# Patient Record
Sex: FEMALE | Race: AMERICAN INDIAN OR ALASKA NATIVE | HISPANIC OR LATINO | Employment: OTHER | URBAN - METROPOLITAN AREA
[De-identification: names, ages, dates, MRNs, and addresses within clinical notes are randomized per-mention and may not be internally consistent; named-entity substitution may affect disease eponyms.]

---

## 2018-09-28 ENCOUNTER — OFFICE VISIT (OUTPATIENT)
Dept: OBGYN | Facility: CLINIC | Age: 33
End: 2018-09-28
Attending: OBSTETRICS & GYNECOLOGY
Payer: COMMERCIAL

## 2018-09-28 VITALS
SYSTOLIC BLOOD PRESSURE: 113 MMHG | HEIGHT: 60 IN | WEIGHT: 129.9 LBS | BODY MASS INDEX: 25.5 KG/M2 | HEART RATE: 93 BPM | DIASTOLIC BLOOD PRESSURE: 77 MMHG

## 2018-09-28 DIAGNOSIS — N92.0 MENORRHAGIA WITH REGULAR CYCLE: ICD-10-CM

## 2018-09-28 DIAGNOSIS — Z12.4 SCREENING FOR MALIGNANT NEOPLASM OF CERVIX: ICD-10-CM

## 2018-09-28 DIAGNOSIS — D25.1 INTRAMURAL LEIOMYOMA OF UTERUS: Primary | ICD-10-CM

## 2018-09-28 PROCEDURE — G0145 SCR C/V CYTO,THINLAYER,RESCR: HCPCS | Performed by: OBSTETRICS & GYNECOLOGY

## 2018-09-28 PROCEDURE — 87624 HPV HI-RISK TYP POOLED RSLT: CPT | Performed by: OBSTETRICS & GYNECOLOGY

## 2018-09-28 PROCEDURE — G0463 HOSPITAL OUTPT CLINIC VISIT: HCPCS

## 2018-09-28 RX ORDER — LEVONORGESTREL 1.5 MG/1
1.5 TABLET ORAL ONCE
Qty: 1 TABLET | Refills: 0 | Status: SHIPPED | OUTPATIENT
Start: 2018-09-28 | End: 2019-12-31

## 2018-09-28 RX ORDER — ESTRADIOL 0.1 MG/G
2 CREAM VAGINAL WEEKLY
Qty: 42.5 G | Refills: 11 | Status: SHIPPED | OUTPATIENT
Start: 2018-09-28 | End: 2019-11-07

## 2018-09-28 NOTE — LETTER
Date:October 8, 2018      Patient was self referred, no letter generated. Do not send.        [facility] Information

## 2018-09-28 NOTE — LETTER
2018       RE: Shayla Mcclure  [address] [place] [id number]     Dear Colleague,    Thank you for referring your patient, Shayla Mcclure, to the [facility] at [facility]. Please see a copy of my visit note below.    32 yo  here to establish care.   Due for routine screening.   Desires Mirena IUD pulled due to irregular spotting.  will obtain a vasectomy. Declines contraception meanwhile, agrees to Plan B Rx.     Moved to [place] for medical care for Daughter. Likely will not have any more children due to hereditary risk for Scooter's Syndrome.     Patient Active Problem List   Diagnosis     Menorrhagia     Past Medical History:   Diagnosis Date     Leiomyoma      Past Surgical History:   Procedure Laterality Date     C/SECTION, LOW TRANSVERSE       MYOMECTOMY UTERUS       Obstetric History       T0      L1     SAB0   TAB0   Ectopic0   Multiple0   Live Births0       # Outcome Date GA Lbr Erik/2nd Weight Sex Delivery Anes PTL Lv   1       CS-LTranv           Social History     Social History     Marital status:      Spouse name: N/A     Number of children: N/A     Years of education: N/A     Occupational History     homemaker      Social History Main Topics     Smoking status: Never Smoker     Smokeless tobacco: Never Used     Alcohol use Yes      Comment: occasionally     Drug use: No     Sexual activity: Yes     Partners: Female     Birth control/ protection: IUD     Other Topics Concern     None     Social History Narrative    Patient and her  moved to [place] (from [place] via [place]) for their daughter with Scooter's Syndrome.     Very happy in [place].     Silvia Leyva         Family History   Problem Relation Age of Onset     Cancer No family hx of      Scooter's Syndrome     /77  Pulse 93  Ht 1.524 m (5')  Wt 58.9 kg (129 lb 14.4 oz)  LMP  (Exact Date)  BMI 25.37  kg/m2  Exam:  Constitutional: healthy, alert and no distress  Head: Normocephalic. No masses, lesions, tenderness or abnormalities  Neck: Neck supple. No adenopathy. Thyroid symmetric, normal size,, Carotids without bruits.  Breast: without masses or lesions bilaterally.no LAD.   Cardiovascular: negative, PMI normal. No lifts, heaves, or thrills. RRR. No murmurs, clicks gallops or rub  Respiratory: negative, Percussion normal. Good diaphragmatic excursion. Lungs clear  Gastrointestinal: Abdomen soft, non-tender. BS normal. No masses, organomegaly  : Pelvic Exam:  Vulva: No external lesions, normal hair distribution, no adenopathy  Vagina: Moist, pink, no abnormal discharge, well rugated, no lesions  Cervix: Pap smear is taken, parous, smooth, pink, no visible lesions  Mirena IUD easily removed with ring forceps.  Uterus: enlarged, globular, anteverted, non-tender, mobile  Ovaries: No mass, non-tender, mobile  Rectal exam: No mass, non-tender, normal sphincter tone  Musculoskeletal: extremities normal- no gross deformities noted, gait normal and normal muscle tone  Skin: no suspicious lesions or rashes  Neurologic: Gait normal. Reflexes normal and symmetric. Sensation grossly WNL.  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  Silvia Leyva      Again, thank you for allowing me to participate in the care of your patient.      Sincerely,    Silvia Leyva MD

## 2018-09-28 NOTE — MR AVS SNAPSHOT
After Visit Summary   9/28/2018    Shayla Mcclure    MRN: [id number]           Patient Information     Date Of Birth          1985        Visit Information        Provider Department      9/28/2018 2:30 PM Silvia Leyva MD [facility]        Today's Diagnoses     Intramural leiomyoma of uterus    -  1    Screening for malignant neoplasm of cervix        Menorrhagia with regular cycle           Follow-ups after your visit        Who to contact     Please call your clinic at [phone] to:    Ask questions about your health    Make or cancel appointments    Discuss your medicines    Learn about your test results    Speak to your doctor            Additional Information About Your Visit        MyChart Information     [email or web address] gives you secure access to your electronic health record. If you see a primary care provider, you can also send messages to your care team and make appointments. If you have questions, please call your primary care clinic.  If you do not have a primary care provider, please call [phone] and they will assist you.      [email or web address] is an electronic gateway that provides easy, online access to your medical records. With [email or web address], you can request a clinic appointment, read your test results, renew a prescription or communicate with your care team.     To access your existing account, please contact your [facility] or call [phone] for assistance.        Care EveryWhere ID     This is your Care EveryWhere ID. This could be used by other organizations to access your [facility] medical records  [id number]        Your Vitals Were     Pulse Height Last Period BMI (Body Mass Index)          93 1.524 m (5') (Exact Date) 25.37 kg/m2         Blood Pressure from Last 3 Encounters:   09/28/18 113/77    Weight from Last 3 Encounters:   09/28/18 58.9 kg (129 lb 14.4 oz)              We Performed the Following     HPV High Risk Types DNA  Cervical     Obtaining, preparing and conveyance of cervical or vaginal smear to laboratory.     Pap imaged thin layer screen with HPV - recommended age 30 - 65 years (select HPV order below)          Today's Medication Changes          These changes are accurate as of 9/28/18 11:59 PM.  If you have any questions, ask your nurse or doctor.               Start taking these medicines.        Dose/Directions    estradiol 0.1 MG/GM cream   Commonly known as:  ESTRACE VAGINAL   Used for:  Intramural leiomyoma of uterus   Started by:  Silvia Leyva MD        Dose:  2 g   Place 2 g vaginally once a week   Quantity:  42.5 g   Refills:  11       levonorgestrel 1.5 MG Tabs tablet   Commonly known as:  PLAN B   Used for:  Intramural leiomyoma of uterus   Started by:  Silvia Leyva MD        Dose:  1.5 mg   Take 1 tablet (1.5 mg) by mouth once for 1 dose   Quantity:  1 tablet   Refills:  0            Where to get your medicines      These medications were sent to [facility] [address]  [address] [address]     Phone:  [phone]     estradiol 0.1 MG/GM cream         Some of these will need a paper prescription and others can be bought over the counter.  Ask your nurse if you have questions.     Bring a paper prescription for each of these medications     levonorgestrel 1.5 MG Tabs tablet                Primary Care Provider    None Specified       No primary provider on file.        Equal Access to Services     REGGIE PAGE AH: Hadlinda Haq, waaxda luqadaha, qaybta kaalmada kristina, john blackman. So [facility] [phone].    ATENCIÓN: Si habla español, tiene a abebe disposición servicios gratuitos de asistencia lingüística. Ifeanyi al [phone].    We comply with applicable federal civil rights laws and [place] laws. We do not discriminate on the basis of race, color, national origin, age, disability, sex,  sexual orientation, or gender identity.            Thank you!     Thank you for choosing [facility]  for your care. Our goal is always to provide you with excellent care. Hearing back from our patients is one way we can continue to improve our services. Please take a few minutes to complete the written survey that you may receive in the mail after your visit with us. Thank you!             Your Updated Medication List - Protect others around you: Learn how to safely use, store and throw away your medicines at www.disposemymeds.org.          This list is accurate as of 9/28/18 11:59 PM.  Always use your most recent med list.                   Brand Name Dispense Instructions for use Diagnosis    estradiol 0.1 MG/GM cream    ESTRACE VAGINAL    42.5 g    Place 2 g vaginally once a week    Intramural leiomyoma of uterus       levonorgestrel 1.5 MG Tabs tablet    PLAN B    1 tablet    Take 1 tablet (1.5 mg) by mouth once for 1 dose    Intramural leiomyoma of uterus       levonorgestrel 20 MCG/24HR IUD    MIRENA     1 each by Intrauterine route once

## 2018-10-02 LAB
COPATH REPORT: NORMAL
PAP: NORMAL

## 2018-10-03 PROBLEM — N92.0 MENORRHAGIA: Status: ACTIVE | Noted: 2018-10-03

## 2018-10-03 LAB
FINAL DIAGNOSIS: NORMAL
HPV HR 12 DNA CVX QL NAA+PROBE: NEGATIVE
HPV16 DNA SPEC QL NAA+PROBE: NEGATIVE
HPV18 DNA SPEC QL NAA+PROBE: NEGATIVE
SPECIMEN DESCRIPTION: NORMAL
SPECIMEN SOURCE CVX/VAG CYTO: NORMAL

## 2018-10-03 NOTE — PROGRESS NOTES
34 yo  here to establish care.   Due for routine screening.   Desires Mirena IUD pulled due to irregular spotting.  will obtain a vasectomy. Declines contraception meanwhile, agrees to Plan B Rx.     Moved to [place] for medical care for Daughter. Likely will not have any more children due to hereditary risk for Scooter's Syndrome.     Patient Active Problem List   Diagnosis     Menorrhagia     Past Medical History:   Diagnosis Date     Leiomyoma      Past Surgical History:   Procedure Laterality Date     C/SECTION, LOW TRANSVERSE       MYOMECTOMY UTERUS       Obstetric History       T0      L1     SAB0   TAB0   Ectopic0   Multiple0   Live Births0       # Outcome Date GA Lbr Erik/2nd Weight Sex Delivery Anes PTL Lv   1       CS-LTranv           Social History     Social History     Marital status:      Spouse name: N/A     Number of children: N/A     Years of education: N/A     Occupational History     homemaker      Social History Main Topics     Smoking status: Never Smoker     Smokeless tobacco: Never Used     Alcohol use Yes      Comment: occasionally     Drug use: No     Sexual activity: Yes     Partners: Female     Birth control/ protection: IUD     Other Topics Concern     None     Social History Narrative    Patient and her  moved to [place] (from [place] via [place]) for their daughter with Scooter's Syndrome.     Very happy in [place].     Silvia Sravani Swansonell         Family History   Problem Relation Age of Onset     Cancer No family hx of      Scooter's Syndrome     /77  Pulse 93  Ht 1.524 m (5')  Wt 58.9 kg (129 lb 14.4 oz)  LMP  (Exact Date)  BMI 25.37 kg/m2  Exam:  Constitutional: healthy, alert and no distress  Head: Normocephalic. No masses, lesions, tenderness or abnormalities  Neck: Neck supple. No adenopathy. Thyroid symmetric, normal size,, Carotids without bruits.  Breast: without masses or lesions bilaterally.no LAD.   Cardiovascular: negative, PMI  normal. No lifts, heaves, or thrills. RRR. No murmurs, clicks gallops or rub  Respiratory: negative, Percussion normal. Good diaphragmatic excursion. Lungs clear  Gastrointestinal: Abdomen soft, non-tender. BS normal. No masses, organomegaly  : Pelvic Exam:  Vulva: No external lesions, normal hair distribution, no adenopathy  Vagina: Moist, pink, no abnormal discharge, well rugated, no lesions  Cervix: Pap smear is taken, parous, smooth, pink, no visible lesions  Mirena IUD easily removed with ring forceps.  Uterus: enlarged, globular, anteverted, non-tender, mobile  Ovaries: No mass, non-tender, mobile  Rectal exam: No mass, non-tender, normal sphincter tone  Musculoskeletal: extremities normal- no gross deformities noted, gait normal and normal muscle tone  Skin: no suspicious lesions or rashes  Neurologic: Gait normal. Reflexes normal and symmetric. Sensation grossly WNL.  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  Silvia Leyva

## 2018-10-04 ENCOUNTER — RADIANT APPOINTMENT (OUTPATIENT)
Dept: ULTRASOUND IMAGING | Facility: CLINIC | Age: 33
End: 2018-10-04
Attending: OBSTETRICS & GYNECOLOGY
Payer: COMMERCIAL

## 2018-10-04 DIAGNOSIS — D25.1 INTRAMURAL LEIOMYOMA OF UTERUS: ICD-10-CM

## 2018-10-04 PROCEDURE — 76830 TRANSVAGINAL US NON-OB: CPT

## 2018-11-13 ENCOUNTER — OFFICE VISIT (OUTPATIENT)
Dept: ORTHOPEDICS | Facility: CLINIC | Age: 33
End: 2018-11-13
Payer: COMMERCIAL

## 2018-11-13 ENCOUNTER — RADIANT APPOINTMENT (OUTPATIENT)
Dept: GENERAL RADIOLOGY | Facility: CLINIC | Age: 33
End: 2018-11-13
Attending: FAMILY MEDICINE
Payer: COMMERCIAL

## 2018-11-13 VITALS
HEIGHT: 60 IN | HEART RATE: 91 BPM | BODY MASS INDEX: 25.32 KG/M2 | SYSTOLIC BLOOD PRESSURE: 110 MMHG | WEIGHT: 129 LBS | DIASTOLIC BLOOD PRESSURE: 71 MMHG

## 2018-11-13 DIAGNOSIS — M79.671 RIGHT FOOT PAIN: Primary | ICD-10-CM

## 2018-11-13 DIAGNOSIS — M79.671 RIGHT FOOT PAIN: ICD-10-CM

## 2018-11-13 RX ORDER — INFLUENZA A VIRUS A/VICTORIA/2454/2019 IVR-207 (H1N1) ANTIGEN (PROPIOLACTONE INACTIVATED), INFLUENZA A VIRUS A/HONG KONG/2671/2019 IVR-208 (H3N2) ANTIGEN (PROPIOLACTONE INACTIVATED), INFLUENZA B VIRUS B/VICTORIA/705/2018 BVR-11 ANTIGEN (PROPIOLACTONE INACTIVATED), INFLUENZA B VIRUS B/PHUKET/3073/2013 BVR-1B ANTIGEN (PROPIOLACTONE INACTIVATED) 15; 15; 15; 15 UG/.5ML; UG/.5ML; UG/.5ML; UG/.5ML
INJECTION, SUSPENSION INTRAMUSCULAR
Refills: 0 | COMMUNITY
Start: 2018-09-28 | End: 2019-12-31

## 2018-11-13 NOTE — LETTER
Date:November 14, 2018      Patient was self referred, no letter generated. Do not send.        [facility] Information

## 2018-11-13 NOTE — MR AVS SNAPSHOT
After Visit Summary   11/13/2018    Shayla Mcclure    MRN: [id number]           Patient Information     Date Of Birth          1985        Visit Information        Provider Department      11/13/2018 4:40 PM Vernon Nguyen, [facility]        Today's Diagnoses     Right foot pain    -  1       Follow-ups after your visit        Who to contact     Please call your clinic at [phone] to:    Ask questions about your health    Make or cancel appointments    Discuss your medicines    Learn about your test results    Speak to your doctor            Additional Information About Your Visit        MyChart Information     [employer] gives you secure access to your electronic health record. If you see a primary care provider, you can also send messages to your care team and make appointments. If you have questions, please call your primary care clinic.  If you do not have a primary care provider, please call [phone] and they will assist you.      [employer] is an electronic gateway that provides easy, online access to your medical records. With [employer], you can request a clinic appointment, read your test results, renew a prescription or communicate with your care team.     To access your existing account, please contact your [facility] or call [phone] for assistance.        Care EveryWhere ID     This is your Care EveryWhere ID. This could be used by other organizations to access your [facility] medical records  [id number]        Your Vitals Were     Pulse Height BMI (Body Mass Index)             91 1.524 m (5') 25.19 kg/m2          Blood Pressure from Last 3 Encounters:   11/13/18 110/71   09/28/18 113/77    Weight from Last 3 Encounters:   11/13/18 58.5 kg (129 lb)   09/28/18 58.9 kg (129 lb 14.4 oz)               Primary Care Provider    None Specified       No primary provider on file.        Equal Access to Services     RISSA  GAAR : Hadii aad ku hadlea Haq, waaxda luqadaha, qaybta kadeanda kristina, john lianna radhaclaribel barfield sbletty alvarez . So [facility] [phone].    ATENCIÓN: Si habla español, tiene a abebe disposición servicios gratuitos de asistencia lingüística. Llame al [phone].    We comply with applicable federal civil rights laws and [place] laws. We do not discriminate on the basis of race, color, national origin, age, disability, sex, sexual orientation, or gender identity.            Thank you!     Thank you for choosing [facility]  for your care. Our goal is always to provide you with excellent care. Hearing back from our patients is one way we can continue to improve our services. Please take a few minutes to complete the written survey that you may receive in the mail after your visit with us. Thank you!             Your Updated Medication List - Protect others around you: Learn how to safely use, store and throw away your medicines at www.disposemymeds.org.          This list is accurate as of 11/13/18  5:35 PM.  Always use your most recent med list.                   Brand Name Dispense Instructions for use Diagnosis    AFLURIA QUADRIVALENT 0.5 ML injection   Generic drug:  influenza quadrivalent (PF) vacc      ADM 0.5ML IM UTD        estradiol 0.1 MG/GM cream    ESTRACE VAGINAL    42.5 g    Place 2 g vaginally once a week    Intramural leiomyoma of uterus       levonorgestrel 1.5 MG Tabs tablet    PLAN B    1 tablet    Take 1 tablet (1.5 mg) by mouth once for 1 dose    Intramural leiomyoma of uterus       levonorgestrel 20 MCG/24HR IUD    MIRENA     1 each by Intrauterine route once

## 2018-11-13 NOTE — PROGRESS NOTES
SPORTS & ORTHOPEDIC WALK-IN VISIT 11/13/2018    Primary Care Physician:   A little bit ago today  while doing laundry, opened dryer door, when the door fell off and landed on R foot, shattering to pieces. Immediate pain in foot, Most is dorsal mid foot. Denies any hx of foot injuries.    Reason for visit:     What part of your body is injured / painful?  right foot    What caused the injury /pain? Dryer door fell on foot    How long ago did your injury occur or pain begin? today    What are your most bothersome symptoms? Pain    How would you characterize your symptom?  sharp and throbing    What makes your symptoms better? Rest and Ice    What makes your symptoms worse? Walking    Have you been previously seen for this problem? No    Medical History:    Any recent changes to your medical history? No    Any new medication prescribed since last visit? No    Have you had surgery on this body part before? No    Social History:    Occupation: Stay at home mom    Handedness: Right    Exercise: 2-3 days/week    Review of Systems:    Do you have fever, chills, weight loss? No    Do you have any vision problems? No    Do you have any chest pain or edema? No    Do you have any shortness of breath or wheezing?  No    Do you have stomach problems? No    Do you have any numbness or focal weakness? No    Do you have diabetes? No    Do you have problems with bleeding or clotting? No    Do you have an rashes or other skin lesions? No       CHIEF COMPLAINT:  Pain of the Right Foot       HISTORY OF PRESENT ILLNESS  Ms. Mcclure is a pleasant 33 year old year old female who presents to clinic today with right foot pain.     Shayla explains that this afternoon she was removing clothes from her dryer when the dryer door broke off, landing directly on the top of her right foot.  She felt immediate pain in her foot and noticed some swelling that developed over the next 15 minutes.  She came immediately here for assessment.  She is  having pain with each step but is able to walk.  She has noticed some discoloration on the top of her foot as well.    Additional history: as documented    MEDICAL HISTORY  Patient Active Problem List   Diagnosis     Menorrhagia       Current Outpatient Prescriptions   Medication Sig Dispense Refill     AFLURIA QUADRIVALENT 0.5 ML injection ADM 0.5ML IM UTD  0     estradiol (ESTRACE VAGINAL) 0.1 MG/GM cream Place 2 g vaginally once a week 42.5 g 11     levonorgestrel (MIRENA) 20 MCG/24HR IUD 1 each by Intrauterine route once       levonorgestrel (PLAN B) 1.5 MG TABS tablet Take 1 tablet (1.5 mg) by mouth once for 1 dose 1 tablet 0       Allergies   Allergen Reactions     Clindamycin        Family History   Problem Relation Age of Onset     Cancer No family hx of      Scooter's Syndrome       Additional medical/Social/Surgical histories reviewed in [facility] and updated as appropriate.     REVIEW OF SYSTEMS (11/13/2018)  CONSTITUTIONAL: Denies fever and weight loss  EYES: Denies acute vision changes  ENT: Denies hearing changes or difficulty swallowing  CARDIAC: Denies chest pain or edema  RESPIRATORY: Denies dyspnea, cough or wheeze  GASTROINTESTINAL: Denies abdominal pain, nausea, vomiting  MUSCULOSKELETAL: See HPI  SKIN: Denies any recent rash or lesion  NEUROLOGICAL: Denies numbness or focal weakness  PSYCHIATRIC: No history of psychiatric symptoms or problems  ENDOCRINE: Denies current diagnosis of diabetes  HEMATOLOGY: Denies episodes of easy bleeding      PHYSICAL EXAM  Vitals:    11/13/18 1635   BP: 110/71   Pulse: 91   Weight: 58.5 kg (129 lb)   Height: 1.524 m (5')     General  - normal appearance, in no obvious distress  CV  - normal pulses at posterior tib and dorsalis pedis  Pulm  - normal respiratory pattern, non-labored  Musculoskeletal -right foot  - stance: Gait favors right foot  - inspection: Mild swelling over medial and dorsal midfoot  - palpation: Tender directly over medial cuneiform  - ROM: normal  active and passive ROM of great and lesser toes, no pain with MT translation  - strength: 5/5 in all planes  Neuro  - no sensory or motor deficit, grossly normal coordination, normal muscle tone  Skin  - no ecchymosis, erythema, warmth, or induration, no obvious rash  Psych  - interactive, appropriate, normal mood and affect         ASSESSMENT & PLAN  Ms. Mcclure is a 33 year old year old female who presents to clinic today with right foot injury.    I ordered and reviewed an x-ray of her foot which shows no obvious acute bony issue.    Shayla most likely has a bony contusion, although I will wait for formal radiology read.  We discussed this in some detail.    We did give her a Cam walker for comfort as if this is a true bony contusion this may take weeks to heal.  She can bear weight while walking in the boot.    If her pain continues over the next few weeks, or worsens, we should follow-up.  If this is the case I would likely repeat her x-ray.  If she continues to do well she can follow-up as needed.    It was a pleasure seeing Shayla today.    Vernon Nguyen DO, CAQSM  Primary Care Sports Medicine

## 2018-11-13 NOTE — LETTER
11/13/2018       RE: Shayla Mcclure  [address] [address]     Dear Colleague,    Thank you for referring your patient, Shayla Mcclure, to the [facility] Please see a copy of my visit note below.          SPORTS & ORTHOPEDIC WALK-IN VISIT 11/13/2018    Primary Care Physician:   A little bit ago today  while doing laundry, opened dryer door, when the door fell off and landed on R foot, shattering to pieces. Immediate pain in foot, Most is dorsal mid foot. Denies any hx of foot injuries.    Reason for visit:     What part of your body is injured / painful?  right foot    What caused the injury /pain? Dryer door fell on foot    How long ago did your injury occur or pain begin? today    What are your most bothersome symptoms? Pain    How would you characterize your symptom?  sharp and throbing    What makes your symptoms better? Rest and Ice    What makes your symptoms worse? Walking    Have you been previously seen for this problem? No    Medical History:    Any recent changes to your medical history? No    Any new medication prescribed since last visit? No    Have you had surgery on this body part before? No    Social History:    Occupation: Stay at home mom    Handedness: Right    Exercise: 2-3 days/week    Review of Systems:    Do you have fever, chills, weight loss? No    Do you have any vision problems? No    Do you have any chest pain or edema? No    Do you have any shortness of breath or wheezing?  No    Do you have stomach problems? No    Do you have any numbness or focal weakness? No    Do you have diabetes? No    Do you have problems with bleeding or clotting? No    Do you have an rashes or other skin lesions? No       CHIEF COMPLAINT:  Pain of the Right Foot       HISTORY OF PRESENT ILLNESS  Ms. Mcclure is a pleasant 33 year old year old female who presents to clinic today with right foot pain.     Shayla  explains that this afternoon she was removing clothes from her dryer when the dryer door broke off, landing directly on the top of her right foot.  She felt immediate pain in her foot and noticed some swelling that developed over the next 15 minutes.  She came immediately here for assessment.  She is having pain with each step but is able to walk.  She has noticed some discoloration on the top of her foot as well.    Additional history: as documented    MEDICAL HISTORY  Patient Active Problem List   Diagnosis     Menorrhagia       Current Outpatient Prescriptions   Medication Sig Dispense Refill     AFLURIA QUADRIVALENT 0.5 ML injection ADM 0.5ML IM UTD  0     estradiol (ESTRACE VAGINAL) 0.1 MG/GM cream Place 2 g vaginally once a week 42.5 g 11     levonorgestrel (MIRENA) 20 MCG/24HR IUD 1 each by Intrauterine route once       levonorgestrel (PLAN B) 1.5 MG TABS tablet Take 1 tablet (1.5 mg) by mouth once for 1 dose 1 tablet 0       Allergies   Allergen Reactions     Clindamycin        Family History   Problem Relation Age of Onset     Cancer No family hx of      Scooter's Syndrome       Additional medical/Social/Surgical histories reviewed [facility] and updated as appropriate.     REVIEW OF SYSTEMS (11/13/2018)  CONSTITUTIONAL: Denies fever and weight loss  EYES: Denies acute vision changes  ENT: Denies hearing changes or difficulty swallowing  CARDIAC: Denies chest pain or edema  RESPIRATORY: Denies dyspnea, cough or wheeze  GASTROINTESTINAL: Denies abdominal pain, nausea, vomiting  MUSCULOSKELETAL: See HPI  SKIN: Denies any recent rash or lesion  NEUROLOGICAL: Denies numbness or focal weakness  PSYCHIATRIC: No history of psychiatric symptoms or problems  ENDOCRINE: Denies current diagnosis of diabetes  HEMATOLOGY: Denies episodes of easy bleeding      PHYSICAL EXAM  Vitals:    11/13/18 1635   BP: 110/71   Pulse: 91   Weight: 58.5 kg (129 lb)   Height: 1.524 m (5')     General  - normal appearance, in no obvious  distress  CV  - normal pulses at posterior tib and dorsalis pedis  Pulm  - normal respiratory pattern, non-labored  Musculoskeletal -right foot  - stance: Gait favors right foot  - inspection: Mild swelling over medial and dorsal midfoot  - palpation: Tender directly over medial cuneiform  - ROM: normal active and passive ROM of great and lesser toes, no pain with MT translation  - strength: 5/5 in all planes  Neuro  - no sensory or motor deficit, grossly normal coordination, normal muscle tone  Skin  - no ecchymosis, erythema, warmth, or induration, no obvious rash  Psych  - interactive, appropriate, normal mood and affect         ASSESSMENT & PLAN  Ms. Mcclure is a 33 year old year old female who presents to clinic today with right foot injury.    I ordered and reviewed an x-ray of her foot which shows no obvious acute bony issue.    Shayla most likely has a bony contusion, although I will wait for formal radiology read.  We discussed this in some detail.    We did give her a Cam walker for comfort as if this is a true bony contusion this may take weeks to heal.  She can bear weight while walking in the boot.    If her pain continues over the next few weeks, or worsens, we should follow-up.  If this is the case I would likely repeat her x-ray.  If she continues to do well she can follow-up as needed.    It was a pleasure seeing Shayla today.    Vernon Nguyen DO, [facility]  Primary Care Sports Medicine      Again, thank you for allowing me to participate in the care of your patient.      Sincerely,    Vernon Nguyen DO

## 2019-03-11 ENCOUNTER — TELEPHONE (OUTPATIENT)
Dept: OBGYN | Facility: CLINIC | Age: 34
End: 2019-03-11

## 2019-03-11 NOTE — TELEPHONE ENCOUNTER
"Shayla calling because she\"is high risk\" and period about 1 week late. Home UPT negative. LMP 2/6/19.   As further questioned her, she thinks she is high risk d/t being Rh Negative blood type. She had bleeding early in last pregnancy, requiring rhogam.   Denies any bleeding or cramping now.   Informed her if pregnant she would get rhogam around 26 wks GA, she only had it last pregnancy d/t the bleeding.  Instructed to repeat upt in 1 week if still doesn't get her period. If still negative without getting a period, instructed to call back for b-hcg. Pt indicated understanding and agreed with plan.    "

## 2019-11-07 ENCOUNTER — OFFICE VISIT (OUTPATIENT)
Dept: FAMILY MEDICINE | Facility: CLINIC | Age: 34
End: 2019-11-07
Payer: COMMERCIAL

## 2019-11-07 VITALS
SYSTOLIC BLOOD PRESSURE: 110 MMHG | OXYGEN SATURATION: 97 % | RESPIRATION RATE: 20 BRPM | HEART RATE: 109 BPM | BODY MASS INDEX: 25.37 KG/M2 | WEIGHT: 129.9 LBS | TEMPERATURE: 98.8 F | DIASTOLIC BLOOD PRESSURE: 70 MMHG

## 2019-11-07 DIAGNOSIS — H69.93 DYSFUNCTION OF BOTH EUSTACHIAN TUBES: ICD-10-CM

## 2019-11-07 DIAGNOSIS — J06.9 VIRAL UPPER RESPIRATORY TRACT INFECTION: Primary | ICD-10-CM

## 2019-11-07 LAB
FLUAV+FLUBV AG SPEC QL: NEGATIVE
FLUAV+FLUBV AG SPEC QL: NEGATIVE
SPECIMEN SOURCE: NORMAL

## 2019-11-07 PROCEDURE — 87804 INFLUENZA ASSAY W/OPTIC: CPT | Mod: 59 | Performed by: FAMILY MEDICINE

## 2019-11-07 PROCEDURE — 99213 OFFICE O/P EST LOW 20 MIN: CPT | Performed by: FAMILY MEDICINE

## 2019-11-07 ASSESSMENT — PAIN SCALES - GENERAL: PAINLEVEL: MODERATE PAIN (5)

## 2019-11-07 NOTE — LETTER
November 7, 2019      George Mcclure  [address] [place] [id number]      To Whom It May Concern:    George Mcclure was in our clinic. He may return to work without restrictions.      Sincerely,        Valente Richardson MD

## 2019-11-07 NOTE — PROGRESS NOTES
Subjective     Shayla Mcclure is a 34 year old female who presents to clinic today for the following health issues:    History of Present Illness        She eats 2-3 servings of fruits and vegetables daily.She consumes 0 sweetened beverage(s) daily.  She is taking medications regularly.     Acute Illness   Acute illness concerns: Tuesday evening to Wednesday morning  Onset: fever chills body aches     Fever: YES -subjective not measured    Chills/Sweats: YES    Headache (location?): YES    Sinus Pressure:YES    Conjunctivitis:  no    Ear Pain: YES: bilateral    Rhinorrhea: YES    Congestion: no     Sore Throat: YES     Cough: YES-non-productive    Wheeze: no     Decreased Appetite: YES    Nausea: YES    Vomiting: no     Diarrhea:  no     Dysuria/Freq.: no     Fatigue/Achiness: YES    Sick/Strep Exposure: YES     Therapies Tried and outcome: OTC meds     Patient states she began feeling sick Tuesday night into Wednesday morning.  She had a subjective fever and chills Wednesday morning but did not measure temperature at home.  She has associated symptoms of bilateral frontal maxillary headache, sinus pressure and congestion, ear pain bilaterally, sore throat, nonproductive cough.  She also has generalized muscle and body aches.  She has tried NyQuil to help her sleep at night.    Typically she would not come in for concern such as this; however, she does have a young daughter at home who recently had a bone marrow transplant approximately 1 year ago and she is just concerned that she could catch this if it is the flu.    She did already receive her flu shot this year.    Patient Active Problem List   Diagnosis     Menorrhagia     Past Surgical History:   Procedure Laterality Date     C/SECTION, LOW TRANSVERSE       MYOMECTOMY UTERUS  2014       Social History     Tobacco Use     Smoking status: Never Smoker     Smokeless tobacco: Never Used   Substance Use Topics     Alcohol use: Yes     Comment: occasionally     Family  History   Problem Relation Age of Onset     Cancer No family hx of         Scooter's Syndrome         Current Outpatient Medications   Medication Sig Dispense Refill     Multiple Vitamins-Minerals (MULTIVITAMIN PO)        NEW MED        NEW MED        AFLURIA QUADRIVALENT 0.5 ML injection ADM 0.5ML IM UTD  0     levonorgestrel (MIRENA) 20 MCG/24HR IUD 1 each by Intrauterine route once       levonorgestrel (PLAN B) 1.5 MG TABS tablet Take 1 tablet (1.5 mg) by mouth once for 1 dose 1 tablet 0     Allergies   Allergen Reactions     Clindamycin      Reviewed and updated as needed this visit by Provider  Tobacco  Allergies  Meds  Problems  Med Hx  Surg Hx  Fam Hx         Review of Systems   ROS COMP: Constitutional, HEENT, lymph, MSK, Derm, cardiovascular, pulmonary, gi and gu systems are negative, except as otherwise noted.      Objective    /70 (BP Location: Left arm, Patient Position: Sitting, Cuff Size: Adult Regular)   Pulse 109   Temp 98.8  F (37.1  C) (Temporal)   Resp 20   Wt 58.9 kg (129 lb 14.4 oz)   LMP 10/30/2019   SpO2 97%   BMI 25.37 kg/m    Body mass index is 25.37 kg/m .  Physical Exam   General: Appears well and in no acute distress.  HEENT: Bilateral edematous nasal turbinates.  Eyes grossly normal to inspection. Extraocular movements intact. Pupils equal, round, and reactive to light. Mucous membranes moist. No ulcers or lesions noted in the oropharynx.  TMs and ear canals normal.  Cardiovascular: Regular rate and rhythm, normal S1 and S2 without murmur. No extra heartsounds or friction rub. Radial pulses present and equal bilaterally.  Respiratory: Lungs clear to auscultation bilaterally. No wheezing or crackles. No prolonged expiration. Symmetrical chest rise.  Musculoskeletal: No gross extremity deformities. No peripheral edema. Normal muscle bulk.     Diagnostic Test Results:  Labs reviewed in Epic  Results for orders placed or performed in visit on 11/07/19 (from the past 24  "hour(s))   Influenza A/B antigen   Result Value Ref Range    Influenza A/B Agn Specimen Nasopharyngeal     Influenza A Negative NEG^Negative    Influenza B Negative NEG^Negative           Assessment & Plan   1. Viral upper respiratory tract infection: No measured fever here and cough is the least of her symptoms.  Less likely to be influenza.  Rapid flu negative.  Discussed that this does not rule out influenza; however, I think it makes it much much less likely.  Discussed the small benefit from Tamiflu in a course duration.  We will proceed with conservative cares at this time.  Discussed continued good hand hygiene and \"vampire cough \" to prevent spread to other family members. For conservative therapies, I recommended Tylenol/ibuprofen for subjective fever and joint muscle aches as well as inhaled nasal steroid such as Flonase, as well as Afrin nasal spray and saline rinses.  Patient agreeable with this plan.  Work note provided for her  who is in attendance today as well.  - Influenza A/B antigen     2. Dysfunction of both eustachian tubes: Recommended Flonase as above.      Return in about 1 week (around 11/14/2019), or if symptoms worsen or fail to improve.    Valente Richardson MD  [facility]    This chart is completed utilizing dictation software; typos and/or incorrect word substitutions may unintentionally occur.      "

## 2019-11-07 NOTE — PATIENT INSTRUCTIONS

## 2019-12-30 ENCOUNTER — NURSE TRIAGE (OUTPATIENT)
Dept: FAMILY MEDICINE | Facility: OTHER | Age: 34
End: 2019-12-30

## 2019-12-30 NOTE — TELEPHONE ENCOUNTER
"  1. DESCRIPTION: \"Describe your dizziness.\"      Fells like she is light headed. Can walk just feels like she is moving  2. LIGHTHEADED: \"Do you feel lightheaded?\" (e.g., somewhat faint, woozy, weak upon standing)      yes  3. VERTIGO: \"Do you feel like either you or the room is spinning or tilting?\" (i.e. vertigo)      Patient is spinning  4. SEVERITY: \"How bad is it?\"  \"Do you feel like you are going to faint?\" \"Can you stand and walk?\"    - MILD - walking normally    - MODERATE - interferes with normal activities (e.g., work, school)     - SEVERE - unable to stand, requires support to walk, feels like passing out now.       mild  5. ONSET:  \"When did the dizziness begin?\"      About a week ago  6. AGGRAVATING FACTORS: \"Does anything make it worse?\" (e.g., standing, change in head position)      Some times walking or bending down  7. HEART RATE: \"Can you tell me your heart rate?\" \"How many beats in 15 seconds?\"  (Note: not all patients can do this)        no  8. CAUSE: \"What do you think is causing the dizziness?\"      unknown  9. RECURRENT SYMPTOM: \"Have you had dizziness before?\" If so, ask: \"When was the last time?\" \"What happened that time?\"      Not askes  10. OTHER SYMPTOMS: \"Do you have any other symptoms?\" (e.g., fever, chest pain, vomiting, diarrhea, bleeding)        Currently has period: started heavy x 2 days now just spotting.  Normally it's heavy for 4-5 days   No nausea    Will wake up fine but worsens throughout the day    11. PREGNANCY: \"Is there any chance you are pregnant?\" \"When was your last menstrual period?\"        Not asked.      Does have family hx of vertigo.    Home Care Advised:   Next 5 appointments (look out 90 days)    Dec 31, 2019 11:00 AM CST  Office Visit with Margaret Aragon PA-C  [facility] ([facility]) [address] [facility] [address] [phone]            See Care Advice section in Epic    Wilton Campoverde, RN, BSN        Additional " Information    Negative: Shock suspected (e.g., cold/pale/clammy skin, too weak to stand, low BP, rapid pulse)    Negative: Difficult to awaken or acting confused (e.g., disoriented, slurred speech)    Negative: Fainted, and still feels dizzy afterwards    Negative: Severe difficulty breathing (e.g., struggling for each breath, speaks in single words)    Negative: Overdose (accidental or intentional) of medications    Negative: New neurologic deficit that is present now: * Weakness of the face, arm, or leg on one side of the body * Numbness of the face, arm, or leg on one side of the body * Loss of speech or garbled speech    Negative: Heart beating < 50 beats per minute OR > 140 beats per minute    Negative: Sounds like a life-threatening emergency to the triager    Negative: Chest pain    Negative: Rectal bleeding, bloody stool, or tarry-black stool    Negative: Vomiting is the main symptom    Negative: Diarrhea is the main symptom    Negative: Headache is the main symptom    Negative: Heat exhaustion suspected (i.e., dehydration from heat exposure)    Negative: Patient states that he/she is having an anxiety/panic attack    Negative: SEVERE dizziness (e.g., unable to stand, requires support to walk, feels like passing out now)    Negative: Severe headache    Negative: Extra heart beats OR irregular heart beating (i.e., 'palpitations')    Negative: Difficulty breathing    Negative: Drinking very little and has signs of dehydration (e.g., no urine > 12 hours, very dry mouth, very lightheaded)    Negative: Follows bleeding (e.g., stomach, rectum, vagina) (Exception: became dizzy from sight of small amount blood)    Negative: Patient sounds very sick or weak to the triager    Negative: Lightheadedness (dizziness) present now, after 2 hours of rest and fluids    Negative: Spinning or tilting sensation (vertigo) present now    Negative: Fever > 103 F (39.4 C)    Negative: Fever > 100.0 F (37.8 C) and has diabetes  mellitus or a weak immune system (e.g., HIV positive, cancer chemotherapy, organ transplant, splenectomy, chronic steroids)    Patient wants to be seen    Negative: Vomiting occurs with dizziness    Protocols used: DIZZINESS-A-OH

## 2019-12-31 ENCOUNTER — OFFICE VISIT (OUTPATIENT)
Dept: FAMILY MEDICINE | Facility: OTHER | Age: 34
End: 2019-12-31
Payer: COMMERCIAL

## 2019-12-31 VITALS
SYSTOLIC BLOOD PRESSURE: 98 MMHG | DIASTOLIC BLOOD PRESSURE: 62 MMHG | HEIGHT: 60 IN | RESPIRATION RATE: 16 BRPM | OXYGEN SATURATION: 98 % | TEMPERATURE: 98.3 F | HEART RATE: 90 BPM | WEIGHT: 127 LBS | BODY MASS INDEX: 24.94 KG/M2

## 2019-12-31 DIAGNOSIS — N92.0 MENORRHAGIA WITH REGULAR CYCLE: ICD-10-CM

## 2019-12-31 DIAGNOSIS — R42 DIZZINESS: Primary | ICD-10-CM

## 2019-12-31 LAB
ALBUMIN SERPL-MCNC: 4 G/DL (ref 3.4–5)
ALP SERPL-CCNC: 61 U/L (ref 40–150)
ALT SERPL W P-5'-P-CCNC: 68 U/L (ref 0–50)
ANION GAP SERPL CALCULATED.3IONS-SCNC: 2 MMOL/L (ref 3–14)
AST SERPL W P-5'-P-CCNC: 30 U/L (ref 0–45)
BASOPHILS # BLD AUTO: 0 10E9/L (ref 0–0.2)
BASOPHILS NFR BLD AUTO: 0.4 %
BILIRUB SERPL-MCNC: 0.5 MG/DL (ref 0.2–1.3)
BUN SERPL-MCNC: 13 MG/DL (ref 7–30)
CALCIUM SERPL-MCNC: 8.5 MG/DL (ref 8.5–10.1)
CHLORIDE SERPL-SCNC: 105 MMOL/L (ref 94–109)
CO2 SERPL-SCNC: 29 MMOL/L (ref 20–32)
CREAT SERPL-MCNC: 0.56 MG/DL (ref 0.52–1.04)
DIFFERENTIAL METHOD BLD: NORMAL
EOSINOPHIL # BLD AUTO: 0.3 10E9/L (ref 0–0.7)
EOSINOPHIL NFR BLD AUTO: 4.7 %
ERYTHROCYTE [DISTWIDTH] IN BLOOD BY AUTOMATED COUNT: 13.3 % (ref 10–15)
GFR SERPL CREATININE-BSD FRML MDRD: >90 ML/MIN/{1.73_M2}
GLUCOSE SERPL-MCNC: 77 MG/DL (ref 70–99)
HCG SERPL QL: NEGATIVE
HCT VFR BLD AUTO: 42.9 % (ref 35–47)
HGB BLD-MCNC: 14.2 G/DL (ref 11.7–15.7)
LYMPHOCYTES # BLD AUTO: 2.3 10E9/L (ref 0.8–5.3)
LYMPHOCYTES NFR BLD AUTO: 34.6 %
MCH RBC QN AUTO: 29.3 PG (ref 26.5–33)
MCHC RBC AUTO-ENTMCNC: 33.1 G/DL (ref 31.5–36.5)
MCV RBC AUTO: 89 FL (ref 78–100)
MONOCYTES # BLD AUTO: 0.4 10E9/L (ref 0–1.3)
MONOCYTES NFR BLD AUTO: 5.9 %
NEUTROPHILS # BLD AUTO: 3.7 10E9/L (ref 1.6–8.3)
NEUTROPHILS NFR BLD AUTO: 54.4 %
PLATELET # BLD AUTO: 326 10E9/L (ref 150–450)
POTASSIUM SERPL-SCNC: 4.3 MMOL/L (ref 3.4–5.3)
PROT SERPL-MCNC: 7.5 G/DL (ref 6.8–8.8)
RBC # BLD AUTO: 4.84 10E12/L (ref 3.8–5.2)
SODIUM SERPL-SCNC: 136 MMOL/L (ref 133–144)
TSH SERPL DL<=0.005 MIU/L-ACNC: 1.55 MU/L (ref 0.4–4)
WBC # BLD AUTO: 6.8 10E9/L (ref 4–11)

## 2019-12-31 PROCEDURE — 99214 OFFICE O/P EST MOD 30 MIN: CPT | Performed by: PHYSICIAN ASSISTANT

## 2019-12-31 PROCEDURE — 80050 GENERAL HEALTH PANEL: CPT | Performed by: PHYSICIAN ASSISTANT

## 2019-12-31 PROCEDURE — 84432 ASSAY OF THYROGLOBULIN: CPT | Performed by: PHYSICIAN ASSISTANT

## 2019-12-31 PROCEDURE — 36415 COLL VENOUS BLD VENIPUNCTURE: CPT | Performed by: PHYSICIAN ASSISTANT

## 2019-12-31 PROCEDURE — 86038 ANTINUCLEAR ANTIBODIES: CPT | Performed by: PHYSICIAN ASSISTANT

## 2019-12-31 PROCEDURE — 84443 ASSAY THYROID STIM HORMONE: CPT | Performed by: PHYSICIAN ASSISTANT

## 2019-12-31 PROCEDURE — 84703 CHORIONIC GONADOTROPIN ASSAY: CPT | Performed by: PHYSICIAN ASSISTANT

## 2019-12-31 PROCEDURE — 80053 COMPREHEN METABOLIC PANEL: CPT | Performed by: PHYSICIAN ASSISTANT

## 2019-12-31 PROCEDURE — 86800 THYROGLOBULIN ANTIBODY: CPT | Performed by: PHYSICIAN ASSISTANT

## 2019-12-31 ASSESSMENT — MIFFLIN-ST. JEOR: SCORE: 1187.57

## 2020-01-01 ENCOUNTER — MYC MEDICAL ADVICE (OUTPATIENT)
Dept: FAMILY MEDICINE | Facility: OTHER | Age: 35
End: 2020-01-01

## 2020-01-01 DIAGNOSIS — R42 DIZZINESS: Primary | ICD-10-CM

## 2020-01-01 RX ORDER — MECLIZINE HYDROCHLORIDE 25 MG/1
25 TABLET ORAL 3 TIMES DAILY PRN
Qty: 60 TABLET | Refills: 1 | Status: SHIPPED | OUTPATIENT
Start: 2020-01-01 | End: 2020-04-16

## 2020-01-02 LAB — ANA SER QL IF: NEGATIVE

## 2020-01-03 ENCOUNTER — MYC MEDICAL ADVICE (OUTPATIENT)
Dept: FAMILY MEDICINE | Facility: OTHER | Age: 35
End: 2020-01-03

## 2020-01-03 LAB
THYROGLOB AB SERPL IA-ACNC: <20 IU/ML (ref 0–40)
THYROGLOB SERPL-MCNC: 29.4 NG/ML

## 2020-01-06 ENCOUNTER — ANCILLARY PROCEDURE (OUTPATIENT)
Dept: ULTRASOUND IMAGING | Facility: OTHER | Age: 35
End: 2020-01-06
Attending: PHYSICIAN ASSISTANT
Payer: COMMERCIAL

## 2020-01-06 DIAGNOSIS — N92.0 MENORRHAGIA WITH REGULAR CYCLE: ICD-10-CM

## 2020-01-06 PROCEDURE — 76830 TRANSVAGINAL US NON-OB: CPT

## 2020-01-06 PROCEDURE — 76856 US EXAM PELVIC COMPLETE: CPT

## 2020-01-07 ENCOUNTER — MYC MEDICAL ADVICE (OUTPATIENT)
Dept: FAMILY MEDICINE | Facility: OTHER | Age: 35
End: 2020-01-07

## 2020-01-13 ENCOUNTER — OFFICE VISIT (OUTPATIENT)
Dept: OBGYN | Facility: OTHER | Age: 35
End: 2020-01-13
Attending: PHYSICIAN ASSISTANT
Payer: COMMERCIAL

## 2020-01-13 VITALS
BODY MASS INDEX: 25.15 KG/M2 | DIASTOLIC BLOOD PRESSURE: 68 MMHG | WEIGHT: 128.8 LBS | HEART RATE: 72 BPM | SYSTOLIC BLOOD PRESSURE: 110 MMHG

## 2020-01-13 DIAGNOSIS — N93.9 ABNORMAL UTERINE BLEEDING (AUB): Primary | ICD-10-CM

## 2020-01-13 PROCEDURE — 99203 OFFICE O/P NEW LOW 30 MIN: CPT | Performed by: OBSTETRICS & GYNECOLOGY

## 2020-01-13 NOTE — NURSING NOTE
Chief Complaint   Patient presents with     Consult     menorrhagia with regular cycle       Initial /68 (BP Location: Right arm, Patient Position: Chair, Cuff Size: Adult Regular)   Pulse 72   Wt 58.4 kg (128 lb 12.8 oz)   LMP 2019 (Exact Date)   BMI 25.15 kg/m   Estimated body mass index is 25.15 kg/m  as calculated from the following:    Height as of 19: 1.524 m (5').    Weight as of this encounter: 58.4 kg (128 lb 12.8 oz).  BP completed using cuff size: regular        The following HM Due: NONE      The following patient reported/Care Every where data was sent to:  P ABSTRACT QUALITY INITIATIVES [[id number]]       Genevieve Carey CMA  2020

## 2020-01-15 NOTE — PROGRESS NOTES
"Consult Note    Consult requested by Margaret Aragon   Chief Complaint   Patient presents with     Consult     menorrhagia with regular cycle      HPI:  Shayla Mcclure is a 34 year old  who presents with menorrhagia with regular cycles.   Menarche age 10, periods are heavy, painful, last 8-10 days. She has not used hormonal suppression since last August. She has skipped cycles in the past, though rare. Recently, has had prolonged spotting since , just now resolved.   She has tried the Mirena () though had it removed due to mood issues ().   She had an open myomectomy (in [place] in ) for two very large fibroids. Her pregnancy was complicated by preeclampsia. 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   She is not using contraception, had not wanted to have anther child given the above, though now are \"trusting god.\" Recent pelvic Ultrasound was normal, Hgb and TSH also normal.     Ob Hx:  s/p , schedule  given hx open myomectomy, pregnancy c/p preeclampsia  Gyn Hx: Patient's last menstrual period was 2019 (exact date).  Menses as above   Last pap NIL, HPV- ()     PMH:   Past Medical History:   Diagnosis Date     Carrier Scooter syndrome (H)     affected child     Leiomyoma      SurgHx:   Past Surgical History:   Procedure Laterality Date     C/SECTION, LOW TRANSVERSE       MYOMECTOMY UTERUS      open myomectomy in [place], reported very large fibroids     FamHx:   Family History   Problem Relation Age of Onset     Other - See Comments Mother         heavy menstrual cycles     Hashimoto's thyroiditis Mother      Lupus Father      Lupus Maternal Aunt      Leukemia Maternal Grandmother      Cancer No family hx of         Scooter's Syndrome     SocHx:   Social History     Socioeconomic History     Marital status:      Spouse name: None     Number of children: None     Years of education: None     Highest education level: None "   Occupational History     Occupation: homemaker   Social Needs     Financial resource strain: None     Food insecurity:     Worry: None     Inability: None     Transportation needs:     Medical: None     Non-medical: None   Tobacco Use     Smoking status: Never Smoker     Smokeless tobacco: Never Used   Substance and Sexual Activity     Alcohol use: Yes     Comment: occasionally     Drug use: No     Sexual activity: Yes     Partners: Female     Birth control/protection: None   Lifestyle     Physical activity:     Days per week: None     Minutes per session: None     Stress: None   Relationships     Social connections:     Talks on phone: None     Gets together: None     Attends Jain service: None     Active member of club or organization: None     Attends meetings of clubs or organizations: None     Relationship status: None     Intimate partner violence:     Fear of current or ex partner: None     Emotionally abused: None     Physically abused: None     Forced sexual activity: None   Other Topics Concern     None   Social History Narrative    Patient and her  moved to [place] (from [facility]) for their daughter with Scooter's Syndrome.     Very happy in [place].     Silvia Leyva     Allergies:   Clindamycin  Current Medications:   Current Outpatient Medications   Medication Sig Dispense Refill     Multiple Vitamins-Minerals (MULTIVITAMIN PO)        meclizine (ANTIVERT) 25 MG tablet Take 1 tablet (25 mg) by mouth 3 times daily as needed for dizziness (Patient not taking: Reported on 1/13/2020) 60 tablet 1     ROS: 10 point ROS negative other than as noted in the HPI    EXAM:  Vitals:    01/13/20 1510   BP: 110/68   BP Location: Right arm   Patient Position: Chair   Cuff Size: Adult Regular   Pulse: 72   Weight: 58.4 kg (128 lb 12.8 oz)    Body mass index is 25.15 kg/m .    Gen: Alert, oriented, appropriately interactive, NAD  CV: RRR  Resp: CTAB, good effort without distress   Abdomen: soft, non  tender, non distended, no masses  Perineum: no lesions; normal appearing external genitalia  Vagina: no masses or lesions or discharge, normally rugated.  Cervix: no masses or lesions or discharge   Bimanual exam:   Uterus: midline, anteverted, small, mobile  no masses, non-tender  Adnexa: no masses or tenderness appreciated   No cervical motion tenderness  MSK: normal gait, symmetric movements UE & LE  Lower extremities: non-tender, no edema    Labs & Imaging:  Pelvic Ultrasound (20): The uterus is normal in size, shape and echotexture  measuring 8.7 x 4.2 x 6.1 cm. Endometrium is 0.9 cm thick and appears  normal.  The ovaries are normal in size, shape and echotexture with no focal  lesions, measuring 4.4 x 2.7 x 2.9 cm on the right and 3.1 x 1.9 x 2.2  cm on the left. Small follicles are noted in the bilateral ovaries  with the largest on the right measuring up to 1.9 cm. There are no  adnexal masses. There is no free fluid in the cul-de-sac.    No abnormal findings are sonographically identified in the area of  patient's pain in the right lower quadrant of the abdomen.                                                            IMPRESSION:  Essentially negative pelvic ultrasound. Etiology for  patient's menorrhagia and right lower quadrant abdominal pain is not  definitely seen.    TSH 1.55  Hgb 14.2    Lab Results   Component Value Date    PAP NIL 2018     ASSESSMENT/PLAN: Shayla Mcclure is a 34 year old  who presents in consultation for menorrhagia, recently abnormal cycles. Ultrasound, exam both normal and reassuring. Hgb and TSH normal. No further fibroids. I suspect anovulatory cycles, will check axis labs. Patient is uncertain about future fertility. Reviewed options for hormonal suppression vs surgery including ablation of hysterectomy if bleeding becomes more bothersome and do not with for future fertility. We will f/u lab results. Return for further discussion if desires future treatment  options.     1. Abnormal uterine bleeding (AUB)  - Follicle stimulating hormone; Future  - Lutropin; Future  - Estradiol; Future    Rod Gardiner MD   1/14/2020 8:37 PM

## 2020-02-18 ENCOUNTER — NURSE TRIAGE (OUTPATIENT)
Dept: FAMILY MEDICINE | Facility: OTHER | Age: 35
End: 2020-02-18

## 2020-02-18 NOTE — TELEPHONE ENCOUNTER
Spoke to patient, no change in her symptoms. Reviewed influenza like illness home cares. She has been taking 500 mg Tylenol per dose every 4-6 hours, not very helpful with 'pain in her skin' and body aches or chills. Temp goes down to around 100 with Tylenol, then back up a few hours later to 103.    Wondered if ok to take Theraflu if she might be pregnant. She is not having trouble with cough or breathing, biggest complaint is body aches and skin hurting. Recommended considering purchasing 325 mg Tylenol tablets or taking 1000 mg and trying to rest.   Recommended urgent care or [facility] tonight if she is interested in possibly starting an antiviral if recommended. Soak in a warm bath or snuggle with a heat pad for 20 min several times a day until better.    She verbalizes understanding and has no further questions.  Katelyn Armstrong, BSN, RN, PHN

## 2020-02-18 NOTE — TELEPHONE ENCOUNTER
Spoke to patient,  Woke up today with 103.1 fever today, chills, harsh voice, body aches, sore throat, cough, congested sinuses  Cough is not productive, no breathing difficulty  Taking Tylenol PRN  She did get a flu shot this season    She is not a candidate for RN standing order for antiviral    DISPOSITION: HOME CARE  - she agrees with this plan, verbalizes understanding of when to seek care if becoming worse or not improving, denies further questions    Katelyn Armstrong, BSN, RN, PHN      Additional Information    Negative: Severe difficulty breathing (e.g., struggling for each breath, speaks in single words)    Negative: Bluish (or gray) lips or face    Negative: Shock suspected (e.g., cold/pale/clammy skin, too weak to stand, low BP, rapid pulse)    Negative: Sounds like a life-threatening emergency to the triager    Negative: Sounds like a cold and there is no fever    Negative: Cough and there is no fever    Negative: Severe cough    Negative: Throat pain and there is no fever    Negative: Severe sore throat    Negative: Influenza vaccine reaction is suspected    Negative: Headache and stiff neck (can't touch chin to chest)    Negative: Chest pain (EXCEPTION: MILD central chest pain, present only when coughing)    Negative: Difficulty breathing that is not severe and not relieved by cleaning out the nose    Negative: Patient sounds very sick or weak to the triager    Negative: Fever > 104 F (40 C)    Negative: Fever > 101 F (38.3 C) and over 60 years of age    Negative: Fever > 100.0 F (37.8 C) and diabetes mellitus or weak immune system (e.g., HIV positive, cancer chemo, splenectomy, organ transplant, chronic steroids)    Negative: Fever > 100.0 F (37.8 C) and bedridden (e.g., nursing home patient, stroke, chronic illness, recovering from surgery)    Negative: HIGH RISK (e.g., age > 64 years, pregnant, HIV+, chronic medical condition) and flu symptoms    Negative: Using nasal washes and pain medicine > 24  hours and sinus pain (lower forehead, cheekbone, or eye) persists    Negative: Fever present > 3 days (72 hours)    Negative: Fever returns after gone for over 24 hours and symptoms worse (or not improved)    Negative: Earache    Negative: Patient wants to be seen    Negative: Patient requests antiviral medicine for influenza and flu symptoms present < 48 hours    Negative: Nasal discharge present > 10 days    Negative: Cough present > 3 weeks    Probable influenza (fever) with no complications and not HIGH RISK    Protocols used: INFLUENZA - SEASONAL-A-OH

## 2020-02-19 ENCOUNTER — OFFICE VISIT (OUTPATIENT)
Dept: FAMILY MEDICINE | Facility: OTHER | Age: 35
End: 2020-02-19
Payer: COMMERCIAL

## 2020-02-19 VITALS
TEMPERATURE: 100 F | OXYGEN SATURATION: 98 % | RESPIRATION RATE: 16 BRPM | HEART RATE: 88 BPM | WEIGHT: 129 LBS | BODY MASS INDEX: 25.32 KG/M2 | HEIGHT: 60 IN | SYSTOLIC BLOOD PRESSURE: 108 MMHG | DIASTOLIC BLOOD PRESSURE: 70 MMHG

## 2020-02-19 DIAGNOSIS — R07.0 THROAT PAIN: ICD-10-CM

## 2020-02-19 DIAGNOSIS — R50.9 FEVER, UNSPECIFIED FEVER CAUSE: Primary | ICD-10-CM

## 2020-02-19 DIAGNOSIS — J10.1 INFLUENZA B: ICD-10-CM

## 2020-02-19 LAB
FLUAV+FLUBV AG SPEC QL: NEGATIVE
FLUAV+FLUBV AG SPEC QL: POSITIVE
SPECIMEN SOURCE: ABNORMAL

## 2020-02-19 PROCEDURE — 87804 INFLUENZA ASSAY W/OPTIC: CPT | Performed by: NURSE PRACTITIONER

## 2020-02-19 PROCEDURE — 40001204 ZZHCL STATISTIC STREP A RAPID: Performed by: NURSE PRACTITIONER

## 2020-02-19 PROCEDURE — 99213 OFFICE O/P EST LOW 20 MIN: CPT | Performed by: NURSE PRACTITIONER

## 2020-02-19 PROCEDURE — 87651 STREP A DNA AMP PROBE: CPT | Performed by: NURSE PRACTITIONER

## 2020-02-19 RX ORDER — OSELTAMIVIR PHOSPHATE 75 MG/1
75 CAPSULE ORAL DAILY
Qty: 7 CAPSULE | Refills: 0 | Status: SHIPPED | OUTPATIENT
Start: 2020-02-19 | End: 2020-04-16

## 2020-02-19 ASSESSMENT — MIFFLIN-ST. JEOR: SCORE: 1196.64

## 2020-02-19 NOTE — PROGRESS NOTES
Subjective     Shayla Mcclure is a 34 year old female who presents to clinic today for the following health issues:    HPI   Acute Illness   Acute illness concerns:  fevers, bodyaches   Onset: Monday    Fever: YES- 103.6    Chills/Sweats: YES    Headache (location?): YES- yesterday    Sinus Pressure:no    Conjunctivitis:  YES- eyes hurt     Ear Pain: YES- feels hot    Rhinorrhea: YES    Congestion: YES    Sore Throat: YES     Cough: YES-non-productive    Wheeze: no     Decreased Appetite: YES    Nausea: YES    Vomiting: no     Diarrhea:  YES- started yesterday    Dysuria/Freq.: no     Fatigue/Achiness: YES- terrible body aches     Sick/Strep Exposure: YES- daughter and ED      Therapies Tried and outcome: tylenol  States symptoms of fever and congestion started 2 days ago is worsening with body aches and cough.   Drinking and eating ok voiding denies nausea.     Patient Active Problem List   Diagnosis     Menorrhagia     Past Surgical History:   Procedure Laterality Date     C/SECTION, LOW TRANSVERSE       MYOMECTOMY UTERUS  2012    open myomectomy in [place], reported very large fibroids       Social History     Tobacco Use     Smoking status: Never Smoker     Smokeless tobacco: Never Used   Substance Use Topics     Alcohol use: Yes     Comment: occasionally     Family History   Problem Relation Age of Onset     Other - See Comments Mother         heavy menstrual cycles     Hashimoto's thyroiditis Mother      Lupus Father      Lupus Maternal Aunt      Leukemia Maternal Grandmother      Cancer No family hx of         Scooter's Syndrome         Current Outpatient Medications   Medication Sig Dispense Refill     Multiple Vitamins-Minerals (MULTIVITAMIN PO)        oseltamivir 75 MG PO capsule Take 1 capsule (75 mg) by mouth daily for 7 days 7 capsule 0     meclizine (ANTIVERT) 25 MG tablet Take 1 tablet (25 mg) by mouth 3 times daily as needed for dizziness (Patient not taking: Reported on 1/13/2020) 60 tablet 1      Allergies   Allergen Reactions     Clindamycin      Recent Labs   Lab Test 12/31/19  1151   ALT 68*   CR 0.56   GFRESTIMATED >90   GFRESTBLACK >90   POTASSIUM 4.3   TSH 1.55      BP Readings from Last 3 Encounters:   02/19/20 108/70   01/13/20 110/68   12/31/19 98/62    Wt Readings from Last 3 Encounters:   02/19/20 58.5 kg (129 lb)   01/13/20 58.4 kg (128 lb 12.8 oz)   12/31/19 57.6 kg (127 lb)         Reviewed and updated as needed this visit by Provider    Review of Systems   ROS COMP: Constitutional, HEENT, cardiovascular, pulmonary, GI, , musculoskeletal, neuro, skin, endocrine and psych systems are negative, except as otherwise noted.      Objective    /70   Pulse 88   Temp 100  F (37.8  C) (Temporal)   Resp 16   Ht 1.524 m (5')   Wt 58.5 kg (129 lb)   SpO2 98%   BMI 25.19 kg/m    Body mass index is 25.19 kg/m .  Physical Exam   GENERAL: alert, no distress and fatigued  EYES: Eyes grossly normal to inspection, PERRL and conjunctivae and sclerae normal  HENT: normal cephalic/atraumatic, ear canals and TM's normal, nose and mouth without ulcers or lesions, nasal mucosa edematous , rhinorrhea clear and yellow, oropharynx clear, oral mucous membranes moist and tonsillar erythema  NECK: bilateral anterior cervical adenopathy, no asymmetry, masses, or scars and thyroid normal to palpation  RESP: lungs clear to auscultation - no rales, rhonchi or wheezes  CV: regular rate and rhythm, normal S1 S2, no S3 or S4, no murmur, click or rub, no peripheral edema and peripheral pulses strong  SKIN: no suspicious lesions or rashes         Assessment & Plan     1. Fever, unspecified fever cause    - Influenza A/B antigen  - Streptococcus A Rapid Scr w Reflx to PCR  - Group A Streptococcus PCR Throat Swab  - oseltamivir 75 MG PO capsule; Take 1 capsule (75 mg) by mouth daily for 7 days  Dispense: 7 capsule; Refill: 0    2. Throat pain    - Streptococcus A Rapid Scr w Reflx to PCR    3. Influenza B  Home care  instructions were reviewed with the patient. The risks, benefits and treatment options of prescribed medications or other treatments have been discussed with the patient. The patient verbalized their understanding and should call or follow up if no improvement or if they develop further problems.    - oseltamivir 75 MG PO capsule; Take 1 capsule (75 mg) by mouth daily for 7 days  Dispense: 7 capsule; Refill: 0     BMI:   Estimated body mass index is 25.19 kg/m  as calculated from the following:    Height as of this encounter: 1.524 m (5').    Weight as of this encounter: 58.5 kg (129 lb).           Patient Instructions     Patient Education     Influenza (Adult)    Influenza is also called the flu. It is a viral illness that affects the air passages of your lungs. It is different from the common cold. The flu can easily be passed from one to person to another. It may be spread through the air by coughing and sneezing. Or it can be spread by touching the sick person and then touching your own eyes, nose, or mouth.  The flu starts 1 to 3 days after you are exposed to the flu virus. It may last for 1 to 2 weeks but many people feel tired or fatigued for many weeks afterward. You usually don t need to take antibiotics unless you have a complication. This might be an ear or sinus infection or pneumonia.  Symptoms of the flu may be mild or severe. They can include extreme tiredness (wanting to stay in bed all day), chills, fevers, muscle aches, soreness with eye movement, headache, and a dry, hacking cough.  Home care  Follow these guidelines when caring for yourself at home:    Avoid being around cigarette smoke, whether yours or other people s.    Acetaminophen or ibuprofen will help ease your fever, muscle aches, and headache. Don t give aspirin to anyone younger than 18 who has the flu. Aspirin can harm the liver.    Nausea and loss of appetite are common with the flu. Eat light meals. Drink 6 to 8 glasses of liquids  every day. Good choices are water, sport drinks, soft drinks without caffeine, juices, tea, and soup. Extra fluids will also help loosen secretions in your nose and lungs.    Over-the-counter cold medicines will not make the flu go away faster. But the medicines may help with coughing, sore throat, and congestion in your nose and sinuses. Don t use a decongestant if you have high blood pressure.    Stay home until your fever has been gone for at least 24 hours without using medicine to reduce fever.  Follow-up care  Follow up with your healthcare provider, or as advised, if you are not getting better over the next week.  If you are age 65 or older, talk with your provider about getting a pneumococcal vaccine every 5 years. You should also get this vaccine if you have chronic asthma or COPD. All adults should get a flu vaccine every fall. Ask your provider about this.  When to seek medical advice  Call your healthcare provider right away if any of these occur:    Cough with lots of colored mucus (sputum) or blood in your mucus    Chest pain, shortness of breath, wheezing, or trouble breathing    Severe headache, or face, neck, or ear pain    New rash with fever    Fever of 100.4 F (38 C) or higher, or as directed by your healthcare provider    Confusion, behavior change, or seizure    Severe weakness or dizziness    You get a new fever or cough after getting better for a few days  Date Last Reviewed: 1/1/2017 2000-2019 [employer]. [address] [place] [id number]. All rights reserved. This information is not intended as a substitute for professional medical care. Always follow your healthcare professional's instructions.             CLAUDIA Holcomb [facility]

## 2020-02-19 NOTE — PATIENT INSTRUCTIONS
Patient Education     Influenza (Adult)    Influenza is also called the flu. It is a viral illness that affects the air passages of your lungs. It is different from the common cold. The flu can easily be passed from one to person to another. It may be spread through the air by coughing and sneezing. Or it can be spread by touching the sick person and then touching your own eyes, nose, or mouth.  The flu starts 1 to 3 days after you are exposed to the flu virus. It may last for 1 to 2 weeks but many people feel tired or fatigued for many weeks afterward. You usually don t need to take antibiotics unless you have a complication. This might be an ear or sinus infection or pneumonia.  Symptoms of the flu may be mild or severe. They can include extreme tiredness (wanting to stay in bed all day), chills, fevers, muscle aches, soreness with eye movement, headache, and a dry, hacking cough.  Home care  Follow these guidelines when caring for yourself at home:    Avoid being around cigarette smoke, whether yours or other people s.    Acetaminophen or ibuprofen will help ease your fever, muscle aches, and headache. Don t give aspirin to anyone younger than 18 who has the flu. Aspirin can harm the liver.    Nausea and loss of appetite are common with the flu. Eat light meals. Drink 6 to 8 glasses of liquids every day. Good choices are water, sport drinks, soft drinks without caffeine, juices, tea, and soup. Extra fluids will also help loosen secretions in your nose and lungs.    Over-the-counter cold medicines will not make the flu go away faster. But the medicines may help with coughing, sore throat, and congestion in your nose and sinuses. Don t use a decongestant if you have high blood pressure.    Stay home until your fever has been gone for at least 24 hours without using medicine to reduce fever.  Follow-up care  Follow up with your healthcare provider, or as advised, if you are not getting better over the next  week.  If you are age 65 or older, talk with your provider about getting a pneumococcal vaccine every 5 years. You should also get this vaccine if you have chronic asthma or COPD. All adults should get a flu vaccine every fall. Ask your provider about this.  When to seek medical advice  Call your healthcare provider right away if any of these occur:    Cough with lots of colored mucus (sputum) or blood in your mucus    Chest pain, shortness of breath, wheezing, or trouble breathing    Severe headache, or face, neck, or ear pain    New rash with fever    Fever of 100.4 F (38 C) or higher, or as directed by your healthcare provider    Confusion, behavior change, or seizure    Severe weakness or dizziness    You get a new fever or cough after getting better for a few days  Date Last Reviewed: 1/1/2017 2000-2019 [employer]. [address] [place] [address], [place] [address]. All rights reserved. This information is not intended as a substitute for professional medical care. Always follow your healthcare professional's instructions.

## 2020-02-19 NOTE — RESULT ENCOUNTER NOTE
Results discussed with patient in clinic. States understanding of these results.    Katherin Cortes CNP

## 2020-02-20 LAB
DEPRECATED S PYO AG THROAT QL EIA: NEGATIVE
SPECIMEN SOURCE: NORMAL
SPECIMEN SOURCE: NORMAL
STREP GROUP A PCR: NOT DETECTED

## 2020-02-22 ENCOUNTER — NURSE TRIAGE (OUTPATIENT)
Dept: NURSING | Facility: CLINIC | Age: 35
End: 2020-02-22

## 2020-02-22 NOTE — TELEPHONE ENCOUNTER
Shayla just found out today that she is pregnant.    She has been on Tamiflu since Wed, (4 doses), after she was diagnosed + Inf B    She has also been taking Tylenol, every 6 hours per package recommended doses    Advised to continue Tamiflu. - Per CDC:     People at Higher risk for Influenza Complications Recommended for Antiviral Treatment   -women who are pregnant or postpartum (within 2 weeks after delivery)    Sherrell Salmeron RN  [facility]      Reason for Disposition    Health Information question, no triage required and triager able to answer question    Protocols used: INFORMATION ONLY CALL-A-AH

## 2020-03-09 ENCOUNTER — PRENATAL OFFICE VISIT (OUTPATIENT)
Dept: OBGYN | Facility: OTHER | Age: 35
End: 2020-03-09
Payer: COMMERCIAL

## 2020-03-09 VITALS
DIASTOLIC BLOOD PRESSURE: 60 MMHG | HEIGHT: 60 IN | SYSTOLIC BLOOD PRESSURE: 120 MMHG | HEART RATE: 78 BPM | WEIGHT: 133 LBS | BODY MASS INDEX: 26.11 KG/M2

## 2020-03-09 DIAGNOSIS — O34.29 PREGNANCY WITH HISTORY OF UTERINE MYOMECTOMY: Primary | ICD-10-CM

## 2020-03-09 DIAGNOSIS — O09.291 HX OF PREECLAMPSIA, PRIOR PREGNANCY, CURRENTLY PREGNANT, FIRST TRIMESTER: ICD-10-CM

## 2020-03-09 DIAGNOSIS — Z32.00 PREGNANCY EXAMINATION OR TEST, PREGNANCY UNCONFIRMED: ICD-10-CM

## 2020-03-09 LAB
ABO + RH BLD: NORMAL
ABO + RH BLD: NORMAL
ALT SERPL W P-5'-P-CCNC: 46 U/L (ref 0–50)
AST SERPL W P-5'-P-CCNC: 13 U/L (ref 0–45)
BLD GP AB SCN SERPL QL: NORMAL
BLOOD BANK CMNT PATIENT-IMP: NORMAL
CREAT SERPL-MCNC: 0.46 MG/DL (ref 0.52–1.04)
CREAT UR-MCNC: 30 MG/DL
ERYTHROCYTE [DISTWIDTH] IN BLOOD BY AUTOMATED COUNT: 13.3 % (ref 10–15)
GFR SERPL CREATININE-BSD FRML MDRD: >90 ML/MIN/{1.73_M2}
HCG UR QL: POSITIVE
HCT VFR BLD AUTO: 39.8 % (ref 35–47)
HGB BLD-MCNC: 13.2 G/DL (ref 11.7–15.7)
MCH RBC QN AUTO: 29.3 PG (ref 26.5–33)
MCHC RBC AUTO-ENTMCNC: 33.2 G/DL (ref 31.5–36.5)
MCV RBC AUTO: 88 FL (ref 78–100)
PLATELET # BLD AUTO: 363 10E9/L (ref 150–450)
PROT UR-MCNC: <0.05 G/L
PROT/CREAT 24H UR: NORMAL G/G CR (ref 0–0.2)
RBC # BLD AUTO: 4.51 10E12/L (ref 3.8–5.2)
SPECIMEN EXP DATE BLD: NORMAL
WBC # BLD AUTO: 7 10E9/L (ref 4–11)

## 2020-03-09 PROCEDURE — 86762 RUBELLA ANTIBODY: CPT | Performed by: OBSTETRICS & GYNECOLOGY

## 2020-03-09 PROCEDURE — 87340 HEPATITIS B SURFACE AG IA: CPT | Performed by: OBSTETRICS & GYNECOLOGY

## 2020-03-09 PROCEDURE — 85027 COMPLETE CBC AUTOMATED: CPT | Performed by: OBSTETRICS & GYNECOLOGY

## 2020-03-09 PROCEDURE — 84450 TRANSFERASE (AST) (SGOT): CPT | Performed by: OBSTETRICS & GYNECOLOGY

## 2020-03-09 PROCEDURE — 36415 COLL VENOUS BLD VENIPUNCTURE: CPT | Performed by: OBSTETRICS & GYNECOLOGY

## 2020-03-09 PROCEDURE — 87591 N.GONORRHOEAE DNA AMP PROB: CPT | Performed by: OBSTETRICS & GYNECOLOGY

## 2020-03-09 PROCEDURE — 82565 ASSAY OF CREATININE: CPT | Performed by: OBSTETRICS & GYNECOLOGY

## 2020-03-09 PROCEDURE — 84156 ASSAY OF PROTEIN URINE: CPT | Performed by: OBSTETRICS & GYNECOLOGY

## 2020-03-09 PROCEDURE — 87086 URINE CULTURE/COLONY COUNT: CPT | Performed by: OBSTETRICS & GYNECOLOGY

## 2020-03-09 PROCEDURE — 86850 RBC ANTIBODY SCREEN: CPT | Performed by: OBSTETRICS & GYNECOLOGY

## 2020-03-09 PROCEDURE — 86780 TREPONEMA PALLIDUM: CPT | Performed by: OBSTETRICS & GYNECOLOGY

## 2020-03-09 PROCEDURE — 86901 BLOOD TYPING SEROLOGIC RH(D): CPT | Performed by: OBSTETRICS & GYNECOLOGY

## 2020-03-09 PROCEDURE — 86900 BLOOD TYPING SEROLOGIC ABO: CPT | Performed by: OBSTETRICS & GYNECOLOGY

## 2020-03-09 PROCEDURE — 87389 HIV-1 AG W/HIV-1&-2 AB AG IA: CPT | Performed by: OBSTETRICS & GYNECOLOGY

## 2020-03-09 PROCEDURE — 84460 ALANINE AMINO (ALT) (SGPT): CPT | Performed by: OBSTETRICS & GYNECOLOGY

## 2020-03-09 PROCEDURE — 87491 CHLMYD TRACH DNA AMP PROBE: CPT | Performed by: OBSTETRICS & GYNECOLOGY

## 2020-03-09 PROCEDURE — 99213 OFFICE O/P EST LOW 20 MIN: CPT | Performed by: OBSTETRICS & GYNECOLOGY

## 2020-03-09 PROCEDURE — 81025 URINE PREGNANCY TEST: CPT | Performed by: OBSTETRICS & GYNECOLOGY

## 2020-03-09 ASSESSMENT — PATIENT HEALTH QUESTIONNAIRE - PHQ9
10. IF YOU CHECKED OFF ANY PROBLEMS, HOW DIFFICULT HAVE THESE PROBLEMS MADE IT FOR YOU TO DO YOUR WORK, TAKE CARE OF THINGS AT HOME, OR GET ALONG WITH OTHER PEOPLE: NOT DIFFICULT AT ALL
SUM OF ALL RESPONSES TO PHQ QUESTIONS 1-9: 5
SUM OF ALL RESPONSES TO PHQ QUESTIONS 1-9: 5

## 2020-03-09 ASSESSMENT — MIFFLIN-ST. JEOR: SCORE: 1216.65

## 2020-03-09 NOTE — NURSING NOTE
"No chief complaint on file.      Initial /60 (BP Location: Right arm, Patient Position: Chair, Cuff Size: Adult Regular)   Pulse 78   Ht 1.527 m (5' 0.12\")   Wt 60.3 kg (133 lb)   LMP 2020   BMI 25.87 kg/m   Estimated body mass index is 25.87 kg/m  as calculated from the following:    Height as of this encounter: 1.527 m (5' 0.12\").    Weight as of this encounter: 60.3 kg (133 lb).  BP completed using cuff size: regular        The following HM Due: NONE      The following patient reported/Care Every where data was sent to:  P ABSTRACT QUALITY INITIATIVES [29979]       Genevieve Carey, [facility]  2020             " no

## 2020-03-10 LAB
C TRACH DNA SPEC QL NAA+PROBE: NEGATIVE
HBV SURFACE AG SERPL QL IA: NONREACTIVE
HIV 1+2 AB+HIV1 P24 AG SERPL QL IA: NONREACTIVE
N GONORRHOEA DNA SPEC QL NAA+PROBE: NEGATIVE
RUBV IGG SERPL IA-ACNC: 24 IU/ML
SPECIMEN SOURCE: NORMAL
SPECIMEN SOURCE: NORMAL
T PALLIDUM AB SER QL: NONREACTIVE

## 2020-03-10 RX ORDER — PRENATAL VIT/IRON FUM/FOLIC AC 27MG-0.8MG
1 TABLET ORAL DAILY
Qty: 90 TABLET | Refills: 3 | Status: SHIPPED | OUTPATIENT
Start: 2020-03-10 | End: 2020-10-15

## 2020-03-10 NOTE — PROGRESS NOTES
New OB    HPI:  Shayla Mcclure is a 34 year old  at 7w0d by LMP of  who presents for a new OB visit. She is experiencing breast pain and mild nausea, otherwise feeling well. This is a desired pregnancy.     Ob Hx: , delivered by  at 27w3d for pre-eclampsia with severe features   Gyn Hx: Patient's last menstrual period was 2020. 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     History of severe pre-eclampsia     delivered at 27w3d     Leiomyoma     prior laparotomy for two large fibroids      SurgHx:   Past Surgical History:   Procedure Laterality Date     C/SECTION, LOW TRANSVERSE      delivered at 27w3d for pre-eclampsia with severe features      COSMETIC MAMMOPLASTY AUGMENTATION BILATERAL      when 16 years old     MYOMECTOMY UTERUS  2012    open myomectomy in [place], reported very large fibroids     FamHx:   Family History   Problem Relation Age of Onset     Other - See Comments Mother         heavy menstrual cycles     Hashimoto's thyroiditis Mother      Lupus Father      Lupus Maternal Aunt      Leukemia Maternal Grandmother      Cancer No family hx of         Scooter's Syndrome     SocHx:   Social History     Socioeconomic History     Marital status:      Spouse name: None     Number of children: None     Years of education: None     Highest education level: None   Occupational History     Occupation: homemaker   Social Needs     Financial resource strain: None     Food insecurity     Worry: None     Inability: None     Transportation needs     Medical: None     Non-medical: None   Tobacco Use     Smoking status: Never Smoker     Smokeless tobacco: Never Used   Substance and Sexual Activity     Alcohol use: Yes     Comment: occasionally     Drug use: No     Sexual activity: Yes     Partners: Female     Birth  "control/protection: None   Lifestyle     Physical activity     Days per week: None     Minutes per session: None     Stress: None   Relationships     Social connections     Talks on phone: None     Gets together: None     Attends [employer] service: None     Active member of club or organization: None     Attends meetings of clubs or organizations: None     Relationship status: None     Intimate partner violence     Fear of current or ex partner: None     Emotionally abused: None     Physically abused: None     Forced sexual activity: None   Other Topics Concern     None   Social History Narrative    Patient and her  moved to [place] (from [place] via [place]) for their daughter with Scooter's Syndrome.     Very happy in [place].     Silvia Sravani Swansonell     Allergies:   Clindamycin  Current Medications:   Current Outpatient Medications   Medication Sig Dispense Refill     Prenatal Vit-Fe Fumarate-FA (PRENATAL MULTIVITAMIN W/IRON) 27-0.8 MG tablet Take 1 tablet by mouth daily 90 tablet 3     Prenatal Vit-Fe Fumarate-FA (PRENATAL VITAMIN PO)        meclizine (ANTIVERT) 25 MG tablet Take 1 tablet (25 mg) by mouth 3 times daily as needed for dizziness (Patient not taking: Reported on 1/13/2020) 60 tablet 1     Multiple Vitamins-Minerals (MULTIVITAMIN PO)        ROS: 10 point ROS negative other than as noted in the HPI    EXAM:  Vitals:    03/09/20 0943   BP: 120/60   BP Location: Right arm   Patient Position: Chair   Cuff Size: Adult Regular   Pulse: 78   Weight: 60.3 kg (133 lb)   Height: 1.527 m (5' 0.12\")    Body mass index is 25.87 kg/m .    Gen: Alert, oriented, appropriately interactive, NAD  CV: RRR, no murmurs, no extra heart sounds, 2+ peripheral pulses  Resp: CTAB, good effort without distress   Breasts: implants in place, no axillary adenopathy, no dominant masses, no skin changes, no nipple discharge, nontender  Abdomen: soft, non tender, non distended, no masses, no hernias. Two Pfannenstiel incision scars " present. No inguinal lymphadenopathy.   Perineum: no lesions; normal appearing external genitalia, bartholins glands, urethra, skenes glands  Vagina: no masses or lesions or discharge, normally rugated.  Cervix: no masses or lesions or discharge   Bimanual exam:   Nontender pelvic floor muscles  Uterus: midline, anteverted, small, mobile  no masses, non-tender  Adnexa: no masses or tenderness appreciated   No cervical motion tenderness  MSK: normal gait, symmetric movements UE & LE  Lower extremities: non-tender, no edema    Labs & Imaging:    OB labs:  A-, Ab-, remainder pending  Hgb 13.2    Baseline labs:  Plt 363, ALT 46, AST 13, Cr 0.46, P:C undetectable     ASSESSMENT/PLAN: Shayla Mcclure is a 34 year old  at 7w0d by LMP of  who presents for a new OB visit.    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  - Referral for genetic counseling as well as MFM consult placed  - Patient aware that screening via amniocentesis is available, she states would continue the pregnancy regardless of the fetal status    AMA  - Discussed cfDNA   - Will plan to collect at next visit once dating confirmed vs drawn at genetic counseling appt    Pregnancy with history of uterine myomectomy  - Operative report of open myomectomy not available, though patient reports extensive invasion into the myometrium, would recommend scheduled  at 36wks  - CBC with Platelets  - ABO/RH Type and Screen  - Urine Culture Aerobic Bacterial  - HIV Antigen Antibody Combo  - Rubella Antibody IgG Quantitative  - Hepatitis B Surface Antigen  - Treponema Abs w Reflex to RPR and Titer  - Neisseria Gonorrhoea PCR CERVICAL  - Chlamydia Tracomatis PCR CERVICAL  - US OB < 14 Weeks Single; Future  - Prenatal Vit-Fe Fumarate-FA (PRENATAL MULTIVITAMIN W/IRON) 27-0.8 MG tablet; Take 1 tablet by mouth daily  Dispense: 90 tablet; Refill: 3  - Creatinine urine calculation only    Hx of preeclampsia, prior pregnancy, currently pregnant, first  trimester  - Reviewed recommendation for low dose ASA beginning at 12wks  - Reviewed fetal surveillance in third trimester    - AST  - ALT  - Creatinine  - Protein  random urine with Creat Ratio    Pregnancy examination or test, pregnancy unconfirmed  - HCG Qual, Urine ([id number])    Rod Gardiner MD   3/10/2020 7:43 AM      Answers for HPI/ROS submitted by the patient on 3/9/2020   If you checked off any problems, how difficult have these problems made it for you to do your work, take care of things at home, or get along with other people?: Not difficult at all  PHQ9 TOTAL SCORE: 5

## 2020-03-11 ENCOUNTER — HEALTH MAINTENANCE LETTER (OUTPATIENT)
Age: 35
End: 2020-03-11

## 2020-03-11 LAB
BACTERIA SPEC CULT: NORMAL
Lab: NORMAL
SPECIMEN SOURCE: NORMAL

## 2020-03-23 ENCOUNTER — OFFICE VISIT (OUTPATIENT)
Dept: OBGYN | Facility: OTHER | Age: 35
End: 2020-03-23
Payer: COMMERCIAL

## 2020-03-23 ENCOUNTER — ANCILLARY PROCEDURE (OUTPATIENT)
Dept: ULTRASOUND IMAGING | Facility: OTHER | Age: 35
End: 2020-03-23
Attending: OBSTETRICS & GYNECOLOGY
Payer: COMMERCIAL

## 2020-03-23 DIAGNOSIS — O34.29 PREGNANCY WITH HISTORY OF UTERINE MYOMECTOMY: ICD-10-CM

## 2020-03-23 DIAGNOSIS — O02.1 MISSED ABORTION: Primary | ICD-10-CM

## 2020-03-23 PROCEDURE — 76817 TRANSVAGINAL US OBSTETRIC: CPT

## 2020-03-23 PROCEDURE — 76801 OB US < 14 WKS SINGLE FETUS: CPT

## 2020-03-23 PROCEDURE — 99213 OFFICE O/P EST LOW 20 MIN: CPT | Performed by: OBSTETRICS & GYNECOLOGY

## 2020-03-23 NOTE — PROGRESS NOTES
Clinic Note    HPI:  Shayla Mcclure is a 34 year old  woman at 8w6d by known LMP who presents with new onset deep pelvic cramping since yesterday, vaginal spotting onset today, and Ultrasound today showing likely early pregnancy loss.     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 Menarche age 10, regular monthly periods are heavy, painful, last 8-10 days                 Last pap NIL, HPV- ()  PMH:   Past Medical History:   Diagnosis Date     Carrier Scooter syndrome (H)     affected child     History of severe pre-eclampsia     delivered at 27w3d     Leiomyoma     prior laparotomy for two large fibroids      SurgHx:   Past Surgical History:   Procedure Laterality Date     C/SECTION, LOW TRANSVERSE      delivered at 27w3d for pre-eclampsia with severe features      COSMETIC MAMMOPLASTY AUGMENTATION BILATERAL      when 16 years old     MYOMECTOMY UTERUS  2012    open myomectomy in [place], reported very large fibroids     FamHx:   Family History   Problem Relation Age of Onset     Other - See Comments Mother         heavy menstrual cycles     Hashimoto's thyroiditis Mother      Lupus Father      Lupus Maternal Aunt      Leukemia Maternal Grandmother      Cancer No family hx of         Scooter's Syndrome     SocHx:   Social History     Socioeconomic History     Marital status:      Spouse name: Not on file     Number of children: Not on file     Years of education: Not on file     Highest education level: Not on file   Occupational History     Occupation: homemaker   Social Needs     Financial resource strain: Not on file     Food insecurity     Worry: Not on file     Inability: Not on file     Transportation needs      Medical: Not on file     Non-medical: Not on file   Tobacco Use     Smoking status: Never Smoker     Smokeless tobacco: Never Used   Substance and Sexual Activity     Alcohol use: Yes     Comment: occasionally     Drug use: No     Sexual activity: Yes     Partners: Male     Birth control/protection: None   Lifestyle     Physical activity     Days per week: Not on file     Minutes per session: Not on file     Stress: Not on file   Relationships     Social connections     Talks on phone: Not on file     Gets together: Not on file     Attends Gnosticism service: Not on file     Active member of club or organization: Not on file     Attends meetings of clubs or organizations: Not on file     Relationship status: Not on file     Intimate partner violence     Fear of current or ex partner: Not on file     Emotionally abused: Not on file     Physically abused: Not on file     Forced sexual activity: Not on file   Other Topics Concern     Not on file   Social History Narrative    Patient and her  moved to [place] (from [place] via [place]) for their daughter with Scooter's Syndrome.     Very happy in [place].     Silvia Leyva     Allergies:   Clindamycin  Current Medications:   Current Outpatient Medications   Medication Sig Dispense Refill     meclizine (ANTIVERT) 25 MG tablet Take 1 tablet (25 mg) by mouth 3 times daily as needed for dizziness (Patient not taking: Reported on 1/13/2020) 60 tablet 1     Multiple Vitamins-Minerals (MULTIVITAMIN PO)        Prenatal Vit-Fe Fumarate-FA (PRENATAL MULTIVITAMIN W/IRON) 27-0.8 MG tablet Take 1 tablet by mouth daily 90 tablet 3     Prenatal Vit-Fe Fumarate-FA (PRENATAL VITAMIN PO)        ROS: 10 point ROS negative other than as noted in the HPI    EXAM:  There were no vitals filed for this visit. There is no height or weight on file to calculate BMI.    Gen: Alert, oriented, appropriately interactive, NAD  CV: RRR  Resp: CTAB, good effort without distress   Abdomen: soft, non  tender, non distended, no masses  MSK: normal gait, symmetric movements UE & LE  Lower extremities: non-tender, no edema    Labs & Imaging:  Results for orders placed or performed in visit on 20 (from the past 24 hour(s))   US OB <14 Weeks w Transvaginal Single    Narrative    ULTRASOUND OBSTETRIC <14 WEEKS WITH TRANSVAGINAL SINGLE 2020  1:45 PM    HISTORY: New pregnancy with history of uterine myomectomy.    TECHNIQUE: Transabdominal and transvaginal imaging were performed.  Transvaginal imaging was performed to better evaluate the uterus and  gestational sac.    COMPARISON: None.    FINDINGS:      Estimated gestational age by current ultrasound measurement: 6 weeks 4  days as determined by mean sac diameter. 6 weeks 2 days as determined  by crown-rump diameter if the structure measured in the amniotic sac  represents a fetal pole. No definite yolk sac is seen. No fetal  heartbeat is yet identified.  Estimated date of delivery based on this ultrasound: 2020.  Patient reported LMP: 2020.  Estimated gestational age by reported LMP: 8 weeks 6 days.    Crown-rump length: 0.5 cm.   Embryonic cardiac activity: No fetal heartbeat is identified in the  structure thought to represent a fetal pole.   Yolk sac: Not seen.  Subchorionic hemorrhage: None.    Gestational sac shape: Grossly within normal limits.  Amniotic fluid volume: Qualitatively within normal limits.    Right ovary: Possible corpus luteum cyst measures 2.2 x 2.0 x 2.1 cm.  Left ovary: Unremarkable.  Adnexal mass: None.  Free pelvic fluid: None.      Impression    IMPRESSION: Single intrauterine gestational sac with probable fetal  pole measuring 6 weeks 2 days gestational age. No definite fetal  heartbeat or yolk sac is identified. Differential diagnosis also  includes a gestation too early to see the heartbeat versus early  intrauterine spontaneous fetal . I recommend follow-up serial  quantitative beta hCG values and  possibly ultrasound.    Lack of fetal heart tones and questionable debris seen in the  gestational sac were called to Dr. Gardiner by the ultrasound  technologist on 3/23/2020 at 1:30 PM.    ANA JAEGER MD       Lab Results   Component Value Date    PAP NIL 2018       ASSESSMENT/PLAN: Shayla Mcclure is a 34 year old  woman at 8w6d by known LMP with uterine cramping and bleeding, and Ultrasound with uncertain fetal pole, no cardiac activity.     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 Missed   - Reviewed that based on certain dates, this is not a viable pregnancy   - Reviewed all options including expectant management, medication management, or surgical management with in office vs OR suction D&C including risks and benefits of all options  - Discussed how much bleeding is to much, when to call  - Will choose expectant management for now, knows she can change her mind at any time    Rod Gardiner MD   3/23/2020 3:12 PM

## 2020-03-26 ENCOUNTER — TELEPHONE (OUTPATIENT)
Dept: FAMILY MEDICINE | Facility: OTHER | Age: 35
End: 2020-03-26

## 2020-03-26 NOTE — TELEPHONE ENCOUNTER
Reason for call:  Symptom   Symptom or request: cramping & bleeding with miscarriage     Duration (how long have symptoms been present): this morning  Have you been treated for this before? Yes    Additional comments: patient calling to speak with nurse regarding current symptoms, she stated the cramping is more severe than period cramps    Phone number to reach patient:  Cell number on file:    Telephone Information:   Mobile [phone]       Best Time:  any    Can we leave a detailed message on this number?  YES    Travel screening: Negative

## 2020-03-26 NOTE — TELEPHONE ENCOUNTER
Pt was seen by Dr. Gardiner on 3/23/2020 for a missed  per US.  Per OV plan: pt chose for expectant management for now.  She had some brown spotting since her appt.  Yesterday, her cramping and bleeding increased.  She had passed some smaller tissue clumps.  Today her cramping is bad, she states it is intermittant, occurring every 5 minutes and lasting about 60 seconds.  I let pt know that her body is getting rid of the pregnancy and cramping is common and needed as the uterus is trying to get rid of the POC.  Advised that the pt try Tylenol and/or Ibuprofen, heating pads, warm bath to help with cramping.  Also advised her to monitor her bleeding, if it gets heavier than a period where she is soaking through regular sized pads in less than an hour she needs to contact us.    Pt verbalized understanding and agreed to plan.    Paulina Aguilera RN

## 2020-03-28 ENCOUNTER — NURSE TRIAGE (OUTPATIENT)
Dept: NURSING | Facility: CLINIC | Age: 35
End: 2020-03-28

## 2020-03-28 NOTE — TELEPHONE ENCOUNTER
"Patient calling stating she had called 3/27/20 regarding miscarriage. Patient is  reporting she was 8 weeks and 5 days pregnant at last OB/GYN appointment on 3/23/20.  Stating she was advised it could take days to weeks to pass. Patient calling today stating she passed \"the baby\" yesterday. Stating  \"It was excruciating pain.\" Reporting abdominal pain and cramping again this morning. Patient took 800 mg of Ibuprofen at 5 a.m. stating pain has improved now to mild. Reporting 1 clot yesterday \"golf ball size.\" Stating she passed a clot again this morning. Afebrile.  Vaginal bleeding is similar to menstrual cycle. Denies feeling faint or dizzy    Per guidelines advised home care.     Reviewed with patient reasons to call back including severe pain, increased vaginal bleeding where it is heavier then her menses and she is soaking a pad in less then 1 hour.    Caller verbalized understanding. Denies further questions.        Martita Peña RN  [facility]            Additional Information    Negative: Shock suspected (e.g., cold/pale/clammy skin, too weak to stand, low BP, rapid pulse)    Negative: Difficult to awaken or acting confused (e.g., disoriented, slurred speech)    Negative: Passed out (i.e., lost consciousness, collapsed and was not responding)    Negative: Sounds like a life-threatening emergency to the triager    Negative: SEVERE abdominal pain    Negative: [1] SEVERE vaginal bleeding (i.e., soaking 2 pads / hour, large blood clots) AND [2] present 2 or more hours    Negative: [1] MODERATE vaginal bleeding (i.e., soaking 1 pad / hour; clots) AND [2] present > 6 hours    Negative: Passed tissue (e.g., gray-white)    Negative: Pale skin (pallor) of new onset or worsening    Negative: Patient sounds very sick or weak to the triager    Negative: [1] Constant abdominal pain AND [2] present > 2 hours    Negative: [1] Caller has URGENT question AND [2] triager unable to answer question    Negative: [1] " Caller has NON-URGENT question AND [2] triager unable to answer question    Negative: MILD vaginal bleeding (i.e., less than one pad / hour)    Negative: Symptoms worsening  (i.e., vaginal bleeding, abdominal pain)    Negative: Ultrasound was NOT performed    Negative: Ultrasound was NOT normal OR caller does not know what ultrasound showed (i.e., live intrauterine pregnancy with fetal cardiac activity [heart beat])    Negative: Not feeling pregnant any longer (e.g., breast tenderness has disappeared)     Diagnosed in clinic as miscarriage.    Negative: SPOTTING (i.e., pinkish / brownish mucous discharge; 1-2 pads a day; no clots)    Negative: Symptoms have improved or gone away  (i.e., vaginal bleeding, abdominal pain)    Negative: Ultrasound showed a normal pregnancy in the uterus (i.e., live intrauterine pregnancy with fetal cardiac activity [heart beat])    Threatened , questions about    Protocols used:  - THREATENED MISCARRIAGE FOLLOW-UP CALL-A-

## 2020-03-29 ENCOUNTER — APPOINTMENT (OUTPATIENT)
Dept: ULTRASOUND IMAGING | Facility: CLINIC | Age: 35
End: 2020-03-29
Attending: FAMILY MEDICINE
Payer: COMMERCIAL

## 2020-03-29 ENCOUNTER — HOSPITAL ENCOUNTER (EMERGENCY)
Facility: CLINIC | Age: 35
Discharge: HOME | End: 2020-03-29
Attending: FAMILY MEDICINE | Admitting: FAMILY MEDICINE
Payer: COMMERCIAL

## 2020-03-29 VITALS
OXYGEN SATURATION: 97 % | RESPIRATION RATE: 24 BRPM | TEMPERATURE: 98.1 F | DIASTOLIC BLOOD PRESSURE: 72 MMHG | SYSTOLIC BLOOD PRESSURE: 126 MMHG | HEART RATE: 103 BPM

## 2020-03-29 DIAGNOSIS — O03.9 MISCARRIAGE: ICD-10-CM

## 2020-03-29 DIAGNOSIS — N93.9 VAGINAL BLEEDING: ICD-10-CM

## 2020-03-29 LAB
ANION GAP SERPL CALCULATED.3IONS-SCNC: 10 MMOL/L (ref 3–14)
APTT PPP: 27 SEC (ref 22–37)
BASOPHILS # BLD AUTO: 0.1 10E9/L (ref 0–0.2)
BASOPHILS NFR BLD AUTO: 0.7 %
BLOOD BANK CMNT PATIENT-IMP: NORMAL
BUN SERPL-MCNC: 13 MG/DL (ref 7–30)
CALCIUM SERPL-MCNC: 8.8 MG/DL (ref 8.5–10.1)
CHLORIDE SERPL-SCNC: 107 MMOL/L (ref 94–109)
CO2 SERPL-SCNC: 21 MMOL/L (ref 20–32)
CREAT SERPL-MCNC: 0.51 MG/DL (ref 0.52–1.04)
DIFFERENTIAL METHOD BLD: NORMAL
EOSINOPHIL NFR BLD AUTO: 3.1 %
ERYTHROCYTE [DISTWIDTH] IN BLOOD BY AUTOMATED COUNT: 12.5 % (ref 10–15)
GFR SERPL CREATININE-BSD FRML MDRD: >90 ML/MIN/{1.73_M2}
GLUCOSE SERPL-MCNC: 101 MG/DL (ref 70–99)
HCT VFR BLD AUTO: 37.3 % (ref 35–47)
HGB BLD-MCNC: 13 G/DL (ref 11.7–15.7)
IMM GRANULOCYTES # BLD: 0 10E9/L (ref 0–0.4)
IMM GRANULOCYTES NFR BLD: 0.4 %
INR PPP: 0.93 (ref 0.86–1.14)
LYMPHOCYTES # BLD AUTO: 5.3 10E9/L (ref 0.8–5.3)
LYMPHOCYTES NFR BLD AUTO: 48.3 %
MCH RBC QN AUTO: 30.3 PG (ref 26.5–33)
MCHC RBC AUTO-ENTMCNC: 34.9 G/DL (ref 31.5–36.5)
MCV RBC AUTO: 87 FL (ref 78–100)
MONOCYTES # BLD AUTO: 0.6 10E9/L (ref 0–1.3)
MONOCYTES NFR BLD AUTO: 5.5 %
NEUTROPHILS # BLD AUTO: 4.6 10E9/L (ref 1.6–8.3)
NEUTROPHILS NFR BLD AUTO: 42 %
NRBC # BLD AUTO: 0 10*3/UL
NRBC BLD AUTO-RTO: 0 /100
PLATELET # BLD AUTO: 339 10E9/L (ref 150–450)
POTASSIUM SERPL-SCNC: 3 MMOL/L (ref 3.4–5.3)
RBC # BLD AUTO: 4.29 10E12/L (ref 3.8–5.2)
SODIUM SERPL-SCNC: 138 MMOL/L (ref 133–144)
WBC # BLD AUTO: 11 10E9/L (ref 4–11)

## 2020-03-29 PROCEDURE — 25000128 H RX IP 250 OP 636: Performed by: FAMILY MEDICINE

## 2020-03-29 PROCEDURE — 96375 TX/PRO/DX INJ NEW DRUG ADDON: CPT | Performed by: FAMILY MEDICINE

## 2020-03-29 PROCEDURE — 86901 BLOOD TYPING SEROLOGIC RH(D): CPT | Performed by: FAMILY MEDICINE

## 2020-03-29 PROCEDURE — 85610 PROTHROMBIN TIME: CPT | Performed by: FAMILY MEDICINE

## 2020-03-29 PROCEDURE — 76801 OB US < 14 WKS SINGLE FETUS: CPT

## 2020-03-29 PROCEDURE — 80048 BASIC METABOLIC PNL TOTAL CA: CPT | Performed by: FAMILY MEDICINE

## 2020-03-29 PROCEDURE — 99285 EMERGENCY DEPT VISIT HI MDM: CPT | Mod: Z6 | Performed by: FAMILY MEDICINE

## 2020-03-29 PROCEDURE — 99285 EMERGENCY DEPT VISIT HI MDM: CPT | Mod: 25 | Performed by: FAMILY MEDICINE

## 2020-03-29 PROCEDURE — 96374 THER/PROPH/DIAG INJ IV PUSH: CPT | Performed by: FAMILY MEDICINE

## 2020-03-29 PROCEDURE — 85461 HEMOGLOBIN FETAL: CPT | Performed by: FAMILY MEDICINE

## 2020-03-29 PROCEDURE — 86850 RBC ANTIBODY SCREEN: CPT | Performed by: FAMILY MEDICINE

## 2020-03-29 PROCEDURE — 25800030 ZZH RX IP 258 OP 636: Performed by: FAMILY MEDICINE

## 2020-03-29 PROCEDURE — 86900 BLOOD TYPING SEROLOGIC ABO: CPT | Performed by: FAMILY MEDICINE

## 2020-03-29 PROCEDURE — 85025 COMPLETE CBC W/AUTO DIFF WBC: CPT | Performed by: FAMILY MEDICINE

## 2020-03-29 PROCEDURE — 96361 HYDRATE IV INFUSION ADD-ON: CPT | Performed by: FAMILY MEDICINE

## 2020-03-29 PROCEDURE — 85730 THROMBOPLASTIN TIME PARTIAL: CPT | Performed by: FAMILY MEDICINE

## 2020-03-29 PROCEDURE — 96372 THER/PROPH/DIAG INJ SC/IM: CPT | Mod: 59 | Performed by: FAMILY MEDICINE

## 2020-03-29 PROCEDURE — 96376 TX/PRO/DX INJ SAME DRUG ADON: CPT | Performed by: FAMILY MEDICINE

## 2020-03-29 RX ORDER — HYDROCODONE BITARTRATE AND ACETAMINOPHEN 5; 325 MG/1; MG/1
1-2 TABLET ORAL EVERY 6 HOURS PRN
Qty: 12 TABLET | Refills: 0 | Status: SHIPPED | OUTPATIENT
Start: 2020-03-29 | End: 2020-04-16

## 2020-03-29 RX ORDER — HYDROMORPHONE HYDROCHLORIDE 1 MG/ML
0.5 INJECTION, SOLUTION INTRAMUSCULAR; INTRAVENOUS; SUBCUTANEOUS ONCE
Status: COMPLETED | OUTPATIENT
Start: 2020-03-29 | End: 2020-03-29

## 2020-03-29 RX ORDER — KETOROLAC TROMETHAMINE 30 MG/ML
30 INJECTION, SOLUTION INTRAMUSCULAR; INTRAVENOUS ONCE
Status: COMPLETED | OUTPATIENT
Start: 2020-03-29 | End: 2020-03-29

## 2020-03-29 RX ADMIN — HYDROMORPHONE HYDROCHLORIDE 0.5 MG: 1 INJECTION, SOLUTION INTRAMUSCULAR; INTRAVENOUS; SUBCUTANEOUS at 03:56

## 2020-03-29 RX ADMIN — HYDROMORPHONE HYDROCHLORIDE 0.5 MG: 1 INJECTION, SOLUTION INTRAMUSCULAR; INTRAVENOUS; SUBCUTANEOUS at 02:42

## 2020-03-29 RX ADMIN — SODIUM CHLORIDE 1000 ML: 9 INJECTION, SOLUTION INTRAVENOUS at 02:42

## 2020-03-29 RX ADMIN — KETOROLAC TROMETHAMINE 30 MG: 30 INJECTION, SOLUTION INTRAMUSCULAR at 02:42

## 2020-03-29 RX ADMIN — HUMAN RHO(D) IMMUNE GLOBULIN 300 MCG: 300 INJECTION, SOLUTION INTRAMUSCULAR at 04:50

## 2020-03-29 NOTE — ED TRIAGE NOTES
"Pt presents with increased vaginal bleeding post miscarriage. Pt saw Dr. Gardiner on 03/23/2020 and had ultrasound. Pt states she was 9 weeks pregnant. Confirmation of miscarriage. Pt states she saw fetal tissue on Friday and assumed \"that I passed baby.\" Pt having severe cramping and \"large clots.\" Pt anxious and hyperventilating. Pt roomed immediately . IV placed and MD to room. Emotional support and encouragement with slowing breathing given.   "

## 2020-03-29 NOTE — ED AVS SNAPSHOT
[facility]  [address] [place] [address]  Phone:  [phone]  Fax:  [phone]                                    Shayla Mcclure   MRN: [id number]    Department:  [facility]   Date of Visit:  3/29/2020           After Visit Summary Signature Page    I have received my discharge instructions, and my questions have been answered. I have discussed any challenges I see with this plan with the nurse or doctor.    ..........................................................................................................................................  Patient/Patient Representative Signature      ..........................................................................................................................................  Patient Representative Print Name and Relationship to Patient    ..................................................               ................................................  Date                                   Time    ..........................................................................................................................................  Reviewed by Signature/Title    ...................................................              ..............................................  Date                                               Time          22EPIC Rev 08/18

## 2020-03-29 NOTE — DISCHARGE INSTRUCTIONS
Your ultrasound shows that you have completed your miscarriage.  Please see the attached handout.  Drink plenty of fluids to stay hydrated.  Take ibuprofen up to 800 mg 3 times a day for the next 3-5 days.  Reserve Vicodin for moderate to severe breakthrough pain.  Return to the emergency department at any time if you develop heavy bleeding, or intractable pain.

## 2020-03-29 NOTE — ED PROVIDER NOTES
[facility] ED Provider Note   Patient: Shayla Mcclure  MRN #:  [id number]  Date of Visit: 2020    CC:     Chief Complaint   Patient presents with     Vaginal Bleeding     HPI:  Shayla Mcclure is a 34 year old female  at approximately 8 weeks gestation who presented to the emergency department with severe pelvic pain and vaginal bleeding.  Patient has a known intrauterine pregnancy with probable fetal demise early in the week.  She saw Dr. Boecke on , and an ultrasound revealed an intrauterine pregnancy at 6-1/2 weeks but without fetal heart tone.  Patient states that on Friday morning, she passed the fetus, and has been having some vaginal bleeding, and passage of clots.  She has been passing clots the size of golf balls.  About an hour ago, the pain became much more intense.  She has a prior history of surgery for myomectomy, and 1  section for preeclampsia at 27 weeks.  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  She reports an allergy to clindamycin.  Patient reports that she went through a nighttime pad in about 15 minutes.  She is A Negative blood type    Problem List:  Patient Active Problem List    Diagnosis Date Noted     Menorrhagia 10/03/2018     Priority: Medium       Past Medical History:   Diagnosis Date     Carrier Scooter syndrome (H)      History of severe pre-eclampsia      Leiomyoma        MEDS: HYDROcodone-acetaminophen (NORCO) 5-325 MG tablet  meclizine (ANTIVERT) 25 MG tablet  Multiple Vitamins-Minerals (MULTIVITAMIN PO)  Prenatal Vit-Fe Fumarate-FA (PRENATAL MULTIVITAMIN W/IRON) 27-0.8 MG tablet  Prenatal Vit-Fe Fumarate-FA (PRENATAL VITAMIN PO)        ALLERGIES:    Allergies   Allergen Reactions     Clindamycin        Past Surgical History:   Procedure Laterality Date     C/SECTION, LOW TRANSVERSE      delivered at 27w3d for pre-eclampsia with severe features      COSMETIC MAMMOPLASTY  AUGMENTATION BILATERAL      when 16 years old     MYOMECTOMY UTERUS  2012    open myomectomy in [place], reported very large fibroids       Social History     Tobacco Use     Smoking status: Never Smoker     Smokeless tobacco: Never Used   Substance Use Topics     Alcohol use: Yes     Comment: occasionally     Drug use: No         Review of Systems   Except as noted in HPI, all other systems were reviewed and are negative    Physical Exam     Vitals were reviewed  Patient Vitals for the past 12 hrs:   BP Temp Temp src Pulse Heart Rate Resp SpO2   03/29/20 0400 123/72 -- -- 98 -- -- 96 %   03/29/20 0330 112/69 -- -- 87 -- -- 96 %   03/29/20 0315 131/88 -- -- 107 -- -- 98 %   03/29/20 0300 114/74 -- -- 86 -- -- 92 %   03/29/20 0232 (!) 147/99 98.1  F (36.7  C) Oral -- 107 24 100 %     GENERAL APPEARANCE: Alert, severe distress due to pain; patient is shaking/shivering from being chilled  FACE: normal facies  EYES: Pupils are equal  HENT: normal external exam  NECK: no adenopathy or asymmetry  RESP: normal respiratory effort; clear breath sounds bilaterally  CV: regular rate and rhythm; no significant murmurs, gallops or rubs  ABD: soft, suprapubic tenderness; no rebound or guarding; bowel sounds are normal  MS: no gross deformities noted; normal muscle tone.  EXT: No calf tenderness or pitting edema  SKIN: no worrisome rash  NEURO: no facial droop; no focal deficits, speech is normal  PSYCH: normal mood and affect      Available Lab/Imaging Results     Results for orders placed or performed during the hospital encounter of 03/29/20 (from the past 24 hour(s))   CBC with platelets differential   Result Value Ref Range    WBC 11.0 4.0 - 11.0 10e9/L    RBC Count 4.29 3.8 - 5.2 10e12/L    Hemoglobin 13.0 11.7 - 15.7 g/dL    Hematocrit 37.3 35.0 - 47.0 %    MCV 87 78 - 100 fl    MCH 30.3 26.5 - 33.0 pg    MCHC 34.9 31.5 - 36.5 g/dL    RDW 12.5 10.0 - 15.0 %    Platelet Count 339 150 - 450 10e9/L    Diff Method Automated  Method     % Neutrophils 42.0 %    % Lymphocytes 48.3 %    % Monocytes 5.5 %    % Eosinophils 3.1 %    % Basophils 0.7 %    % Immature Granulocytes 0.4 %    Nucleated RBCs 0 0 /100    Absolute Neutrophil 4.6 1.6 - 8.3 10e9/L    Absolute Lymphocytes 5.3 0.8 - 5.3 10e9/L    Absolute Monocytes 0.6 0.0 - 1.3 10e9/L    Absolute Basophils 0.1 0.0 - 0.2 10e9/L    Abs Immature Granulocytes 0.0 0 - 0.4 10e9/L    Absolute Nucleated RBC 0.0    Basic metabolic panel   Result Value Ref Range    Sodium 138 133 - 144 mmol/L    Potassium 3.0 (L) 3.4 - 5.3 mmol/L    Chloride 107 94 - 109 mmol/L    Carbon Dioxide 21 20 - 32 mmol/L    Anion Gap 10 3 - 14 mmol/L    Glucose 101 (H) 70 - 99 mg/dL    Urea Nitrogen 13 7 - 30 mg/dL    Creatinine 0.51 (L) 0.52 - 1.04 mg/dL    GFR Estimate >90 >60 mL/min/[1.73_m2]    GFR Estimate If Black >90 >60 mL/min/[1.73_m2]    Calcium 8.8 8.5 - 10.1 mg/dL   INR   Result Value Ref Range    INR 0.93 0.86 - 1.14   Partial thromboplastin time   Result Value Ref Range    PTT 27 22 - 37 sec   ABO/Rh type and screen   Result Value Ref Range    ABO A     RH(D) Neg     Antibody Screen Neg     Test Valid Only At [facility]        Specimen Expires 04/01/2020    OB < 14 weeks single or first gestation US    Narrative    EXAM: US OB < 14 WEEKS SINGLE  LOCATION: [facility]  DATE/TIME: 3/29/2020 3:27 AM    INDICATION: Vaginal bleeding after miscarriage.  COMPARISON: None.  TECHNIQUE: Transabdominal scans were performed.     FINDINGS:  The endometrium measures 1.9 cm with heterogeneous appearance but no increased flow. No gestational sac. No fetal pole. No fetal cardiac activity. There is a 1.7 x 1.6 x 1.4 cm right ovarian cyst. Maternal ovaries otherwise unremarkable.      Impression    IMPRESSION:   1.  No live intrauterine pregnancy.  2.  Thickened heterogeneous endometrium which could be due to blood clot.  3.  No increased flow to suggest retained products.              Impression      Final diagnoses:   Miscarriage   Vaginal bleeding         ED Course & Medical Decision Making   Shayla Mcclure is a 34 year old female  at 8-1/2 weeks gestation by dates who presented to the emergency department with suspected missed AB.  Patient was seen by Dr. Boecke last Monday, and an ultrasound revealed a missed  at 6 weeks 2 days.  There is no fetal heartbeat and questionable debris seen in the gestational sac.  Patient reports that on Friday morning around 6:30 AM, she completed miscarriage of the fetus.  She started to have some bleeding and passage of clots the size of golf balls.  About an hour ago, her bleeding became much worse and she started to have severe pain.  Pain was intense and she was shaking when she arrived.  Vital signs reveal a temp of 98.1, blood pressure 147/99, heart rate of 107, respiratory rate of 24 with 100% oxygen saturation.  Patient reported pain over the pelvic and suprapubic region.  A peripheral IV was established, and she was given IV fluids, Toradol and Dilaudid for pain.  CBC reveals a white blood count of 11.0, hemoglobin of 13.0 and platelet count of 339.  Her hemoglobin from  was 13.2.  Comprehensive metabolic panel reveals slightly low potassium of 3.0, with normal INR level of 0.93.  Patient has A negative blood type, received a dose of RhoGam with her last pregnancy at 9 weeks and after she delivered prematurely.  Patient states that her partner has Rh+ blood type.. OB ultrasound reveals no live intrauterine pregnancy.  There is thickened heterogeneous endometrium which could be due to blood clot.  There is no increased flow to suggest retained products of conception.  It appears that the patient has completed her miscarriage.  We discussed the likelihood that she may still have some mild bleeding and cramping, but she should return if she has heavy bleeding or uncontrolled pain.. Patient was given a mini dose of RhoGam..  She will take ibuprofen up  to 800 mg 3 times a day as needed for pain for 3-5 days.  Add Vicodin for moderate to severe breakthrough pain.      Medications   Blood Bank will determine if patient is eligible for and the proper dosage of Rho (D) immune globulin (RhoGam) (has no administration in time range)   HYDROmorphone (PF) (DILAUDID) injection 0.5 mg (0.5 mg Intravenous Given 3/29/20 0242)   0.9% sodium chloride BOLUS (0 mLs Intravenous Stopped 3/29/20 0327)   ketorolac (TORADOL) injection 30 mg (30 mg Intravenous Given 3/29/20 0242)   HYDROmorphone (PF) (DILAUDID) injection 0.5 mg (0.5 mg Intravenous Given 3/29/20 0356)         Written after-visit summary and instructions were given at the time of discharge.         Disclaimer: This note consists of words and symbols derived from keyboarding and dictation using voice recognition software.  As a result, there may be errors that have gone undetected.  Please consider this when interpreting information found in this note.       Mat Conde MD  03/29/20 [id number]

## 2020-03-29 NOTE — ED NOTES
Pt back from ultrasound.  Pt requesting something for pain, states that her pain has returned.  Updated provider.

## 2020-03-30 ENCOUNTER — TELEPHONE (OUTPATIENT)
Dept: FAMILY MEDICINE | Facility: OTHER | Age: 35
End: 2020-03-30

## 2020-03-30 LAB
ABO + RH BLD: NORMAL
BLD GP AB SCN SERPL QL: NORMAL
BLOOD BANK CMNT PATIENT-IMP: NORMAL
BLOOD BANK CMNT PATIENT-IMP: NORMAL
DATE RH IMM GL GVN: NORMAL
FETAL CELL SCN BLD QL ROSETTE: NORMAL
RH IG VIALS RECOM PATIENT: NORMAL
SPECIMEN EXP DATE BLD: NORMAL

## 2020-03-30 NOTE — TELEPHONE ENCOUNTER
"Spoke with pt today.  She states she passed \"baby\" on Friday and continue to have very intense cramping/\"contractions\". Friday evening she also passed some golf ball sized clots along with vaginal bleeding but not heavy.  Saturday morning she continued to have \"very intense cramping\" and low back pain.  She felt the need to push and have a bowel movement.  When she beared down nothing came out.  During the day she was fine but the pain came back again Tim morning and was very intense.  She states this time it didn't come and go but was constant.  She states that it was like she was being stabbed in the back and through her stomach down into her vagina.  She did take Ibuprofen and Tylenol but it didn't help.  She states that she was passing golf ball sized clots and soaking through maxi pads in 5 minutes.  She did end up going to the [facility].  She states they did an US and she states they told her there was no more \"clots\" left in her uterus and that she had a complete miscarriage.  They gave her a Rhogam injection and pain medications.  Pt states today she is bleeding like a \"light period\" and she is experiencing some mild low back pain.  She is wondering if there was anything else she needed to be doing at this point.    Will route to Dr. Valenzuela to see if he wants to order serial HCG quants for pt or what he would like to do.    Paulina Aguilera RN    "

## 2020-03-30 NOTE — TELEPHONE ENCOUNTER
Rod Gardiner MD  You 1 hour ago (1:44 PM)       She doesn't need to do anything else. Would expect symptoms to continue to improve. She should let us know if she wants contraception, if not, continue to take prenatal vitamins.       Relayed Dr. Gardiner's message above.  Pt verbalized understanding and agreed to plan.    Paulina Aguilera RN

## 2020-04-03 ENCOUNTER — TELEPHONE (OUTPATIENT)
Dept: OBGYN | Facility: OTHER | Age: 35
End: 2020-04-03

## 2020-04-03 DIAGNOSIS — O03.9 SPONTANEOUS ABORTION: Primary | ICD-10-CM

## 2020-04-03 NOTE — TELEPHONE ENCOUNTER
Pt was recovering from her recent miscarriage.  She states her bleeding and pain have improved the last couple days where her bleeding was just lighter brown blood.  She had a lot of energy again yesterday so she ended up cleaning her house and doing a lot of housework.  She started to experiencing intense abdominal pain, low back pain, passed a large clot (4-5 inches long and about width of finger) and vaginal bleeding yesterday evening.  She soaked through a heavy maxi pad in 10 minutes and had extreme pain.  She took Vicodin and called the  ER.  She was able to speak with the ER doc that she saw on 3/29.  He told her that she overdid it with housework and she needed to rest more.  He advised that if she bleeds through 3 pads in 3 hours then she needed to be evaluated in the ED again.  Pt states on the 3rd hour her bleeding had slowed down and her pain had decreased from the Vicodin so she didn't go into the ED last night.  Today her bleeding is like a normal period and she had been taking Ibuprofen around the clock to help manage the cramping.    Pt is wondering if this is normal to have this heavy bleeding, passing of clots and intense pain start up again after she was feeling better and bleeding appeared to be stopping.    Will route to Dr. Gardiner for further recommendations.    Paulina Aguilera RN

## 2020-04-03 NOTE — TELEPHONE ENCOUNTER
Rod Gardiner MD  You Just now (1:43 PM)       On the recent ED Ultrasound, it looked like everything had passed, though, it is unusual to continue to have significant bleeding and cramping following this, so if this continues, I would have her repeat the Ultrasound. There is a small possibility that there is retained tissue which was missed on the last Ultrasound. Depending on how her symptoms are currently, she could try to get in for an Ultrasound yet today. I will place an order in case she needs it. If her symptoms are improving, I would just have her continue to monitor at home.     Relayed Dr. Gardiner's recommendations to pt.  Pt's bleeding and cramping have improved today but for her piece of mind she would like to have an US.  Gave pt the phone number to call to schedule an appt.    Paulina Aguilera RN

## 2020-04-03 NOTE — TELEPHONE ENCOUNTER
Patient called and would like to speak to Rn of Dr. Gardiner- regarding more advise regarding her miscarriage-which she said that he is already aware of.  She had talked to one of the ED staff regarding last night she is bleeding again with pain- They gave her some advise she said to do at home but still wants to talk to the OB Team. Please call

## 2020-04-06 ENCOUNTER — ANCILLARY PROCEDURE (OUTPATIENT)
Dept: ULTRASOUND IMAGING | Facility: OTHER | Age: 35
End: 2020-04-06
Attending: OBSTETRICS & GYNECOLOGY
Payer: COMMERCIAL

## 2020-04-06 ENCOUNTER — TELEPHONE (OUTPATIENT)
Dept: FAMILY MEDICINE | Facility: OTHER | Age: 35
End: 2020-04-06

## 2020-04-06 DIAGNOSIS — O03.9 SPONTANEOUS ABORTION: ICD-10-CM

## 2020-04-06 PROCEDURE — 76856 US EXAM PELVIC COMPLETE: CPT

## 2020-04-06 PROCEDURE — 76830 TRANSVAGINAL US NON-OB: CPT

## 2020-04-06 NOTE — TELEPHONE ENCOUNTER
Reason for Call:  Request for results:    Name of test or procedure: Ultrasound pelvic/vaginal    Date of test of procedure: 4/6/20    Location of the test or procedure: [facility] to leave the result message on voice mail or with a family member? YES    Phone number Patient can be reached at:  Home number on file [phone] (home)    Additional comments: Please have covering provider review and then contact patient with results.     Call taken on 4/6/2020 at 4:08 PM by Karen Boyle

## 2020-04-07 ENCOUNTER — MYC MEDICAL ADVICE (OUTPATIENT)
Dept: OBGYN | Facility: OTHER | Age: 35
End: 2020-04-07

## 2020-04-07 NOTE — TELEPHONE ENCOUNTER
Per Dr. Mcpherson:  the us only shows a small amount of products/blood products, which may pass on their own but there is no real way to know how much is left or how long this can last

## 2020-04-08 ENCOUNTER — PREP FOR PROCEDURE (OUTPATIENT)
Dept: OBGYN | Facility: CLINIC | Age: 35
End: 2020-04-08

## 2020-04-08 DIAGNOSIS — O03.4 RETAINED PRODUCTS OF CONCEPTION AFTER MISCARRIAGE: Primary | ICD-10-CM

## 2020-04-08 NOTE — TELEPHONE ENCOUNTER
Spoke with pt.  She spoke with her  and they have decided they would like to do a suction D&C under anesthesia.  Pt's 's next days off are this Saturday and Sunday (11th and 12th) and then not again until next Thursday (16th).  The surgery would have to be scheduled around on those days due to childcare by spouse.    Pt states her bleeding has slowed down but she is nervous that it will get bad again because last week it was slowing down and then got really heavy.      Will route to Dr. Mcpherson to place surgery orders if appropriate.    Paulina Aguilera RN

## 2020-04-09 ENCOUNTER — PREP FOR PROCEDURE (OUTPATIENT)
Dept: OBGYN | Facility: CLINIC | Age: 35
End: 2020-04-09

## 2020-04-09 DIAGNOSIS — O03.4 RETAINED PRODUCTS OF CONCEPTION AFTER MISCARRIAGE: Primary | ICD-10-CM

## 2020-04-09 NOTE — TELEPHONE ENCOUNTER
Pt is scheduled for a SD&C on 4/16/2020.  I do not see that pt is scheduled for a pre-op appt yet.

## 2020-04-09 NOTE — TELEPHONE ENCOUNTER
Haydee Mcpherson, DO  You 50 minutes ago (10:29 AM)       Visit scheduled in [facility] prior to surgery, scheduling ultrasound prior per patient's request as well.     Haydee Mcpherson, DO

## 2020-04-15 ENCOUNTER — ANESTHESIA EVENT (OUTPATIENT)
Dept: SURGERY | Facility: CLINIC | Age: 35
End: 2020-04-15
Payer: COMMERCIAL

## 2020-04-15 NOTE — ANESTHESIA PREPROCEDURE EVALUATION
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 Pt has had prior anesthetic. Type: General    No history of anesthetic complications          ROS/MED HX    ENT/Pulmonary:  - neg pulmonary ROS     Neurologic:  - neg neurologic ROS     Cardiovascular:  - neg cardiovascular ROS   (+) ----. : . . . :. . No previous cardiac testing       METS/Exercise Tolerance:     Hematologic: Comments: Miscarriage, vaginal bleeding.  Hgb stable, here for D&C         Musculoskeletal:  - neg musculoskeletal ROS       GI/Hepatic:     (+) liver disease,      (-) GERD   Renal/Genitourinary:  - ROS Renal section negative       Endo:  - neg endo ROS       Psychiatric:  - neg psychiatric ROS       Infectious Disease:         Malignancy:      - no malignancy   Other: Comment: Miscarriage   - neg other ROS                      Physical Exam  Normal systems: cardiovascular, pulmonary and dental    Airway   Mallampati: II  TM distance: >3 FB  Neck ROM: full    Dental     Cardiovascular   Rhythm and rate: regular and normal      Pulmonary    breath sounds clear to auscultation            Lab Results   Component Value Date    WBC 11.0 2020    HGB 13.0 2020    HCT 37.3 2020      "03/29/2020     03/29/2020    POTASSIUM 3.0 (L) 03/29/2020    CHLORIDE 107 03/29/2020    CO2 21 03/29/2020    BUN 13 03/29/2020    CR 0.51 (L) 03/29/2020     (H) 03/29/2020    KASSANDRA 8.8 03/29/2020    ALBUMIN 4.0 12/31/2019    PROTTOTAL 7.5 12/31/2019    ALT 46 03/09/2020    AST 13 03/09/2020    ALKPHOS 61 12/31/2019    BILITOTAL 0.5 12/31/2019    PTT 27 03/29/2020    INR 0.93 03/29/2020    TSH 1.55 12/31/2019    HCG Positive (A) 03/09/2020    HCGS Negative 12/31/2019       Preop Vitals  BP Readings from Last 3 Encounters:   03/29/20 126/72   03/09/20 120/60   02/19/20 108/70    Pulse Readings from Last 3 Encounters:   03/29/20 103   03/09/20 78   02/19/20 88      Resp Readings from Last 3 Encounters:   03/29/20 24   02/19/20 16   12/31/19 16    SpO2 Readings from Last 3 Encounters:   03/29/20 97%   02/19/20 98%   12/31/19 98%      Temp Readings from Last 1 Encounters:   03/29/20 98.1  F (36.7  C) (Oral)    Ht Readings from Last 1 Encounters:   03/09/20 1.527 m (5' 0.12\")      Wt Readings from Last 1 Encounters:   03/09/20 60.3 kg (133 lb)    Estimated body mass index is 25.87 kg/m  as calculated from the following:    Height as of 3/9/20: 1.527 m (5' 0.12\").    Weight as of 3/9/20: 60.3 kg (133 lb).       Anesthesia Plan      History & Physical Review  History and physical reviewed and following examination; no interval change.    ASA Status:  2 .    NPO Status:  > 8 hours    Plan for MAC with Intravenous and Propofol induction. Maintenance will be TIVA.  Reason for MAC:  Difficulty with conscious sedation (QS)  PONV prophylaxis:  Ondansetron (or other 5HT-3) and Dexamethasone or Solumedrol         Postoperative Care  Postoperative pain management:  IV analgesics and Oral pain medications.      Consents                 Vernon Pressley, APRN CRNA  "

## 2020-04-16 ENCOUNTER — MYC MEDICAL ADVICE (OUTPATIENT)
Dept: OBGYN | Facility: CLINIC | Age: 35
End: 2020-04-16

## 2020-04-16 ENCOUNTER — HOSPITAL ENCOUNTER (OUTPATIENT)
Dept: ULTRASOUND IMAGING | Facility: CLINIC | Age: 35
End: 2020-04-16
Attending: OBSTETRICS & GYNECOLOGY
Payer: COMMERCIAL

## 2020-04-16 ENCOUNTER — HOSPITAL ENCOUNTER (OUTPATIENT)
Facility: CLINIC | Age: 35
Discharge: HOME | End: 2020-04-16
Attending: OBSTETRICS & GYNECOLOGY | Admitting: OBSTETRICS & GYNECOLOGY
Payer: COMMERCIAL

## 2020-04-16 ENCOUNTER — OFFICE VISIT (OUTPATIENT)
Dept: OBGYN | Facility: CLINIC | Age: 35
End: 2020-04-16
Payer: COMMERCIAL

## 2020-04-16 ENCOUNTER — OFFICE VISIT (OUTPATIENT)
Dept: FAMILY MEDICINE | Facility: CLINIC | Age: 35
End: 2020-04-16
Payer: COMMERCIAL

## 2020-04-16 ENCOUNTER — ANESTHESIA (OUTPATIENT)
Dept: SURGERY | Facility: CLINIC | Age: 35
End: 2020-04-16
Payer: COMMERCIAL

## 2020-04-16 VITALS
BODY MASS INDEX: 26 KG/M2 | HEART RATE: 84 BPM | DIASTOLIC BLOOD PRESSURE: 70 MMHG | OXYGEN SATURATION: 99 % | HEIGHT: 60 IN | SYSTOLIC BLOOD PRESSURE: 106 MMHG | WEIGHT: 132.4 LBS | RESPIRATION RATE: 14 BRPM | TEMPERATURE: 97.9 F

## 2020-04-16 VITALS
DIASTOLIC BLOOD PRESSURE: 77 MMHG | SYSTOLIC BLOOD PRESSURE: 116 MMHG | OXYGEN SATURATION: 98 % | HEART RATE: 100 BPM | TEMPERATURE: 98.1 F | RESPIRATION RATE: 14 BRPM

## 2020-04-16 DIAGNOSIS — O03.4 RETAINED PRODUCTS OF CONCEPTION AFTER MISCARRIAGE: ICD-10-CM

## 2020-04-16 DIAGNOSIS — Z01.818 PREOP GENERAL PHYSICAL EXAM: Primary | ICD-10-CM

## 2020-04-16 DIAGNOSIS — O02.1 MISSED AB: Primary | ICD-10-CM

## 2020-04-16 LAB
ERYTHROCYTE [DISTWIDTH] IN BLOOD BY AUTOMATED COUNT: 12.7 % (ref 10–15)
HCT VFR BLD AUTO: 39.2 % (ref 35–47)
HGB BLD-MCNC: 13 G/DL (ref 11.7–15.7)
HGB BLD-MCNC: 13.2 G/DL (ref 11.7–15.7)
MCH RBC QN AUTO: 30.4 PG (ref 26.5–33)
MCHC RBC AUTO-ENTMCNC: 33.7 G/DL (ref 31.5–36.5)
MCV RBC AUTO: 90 FL (ref 78–100)
PLATELET # BLD AUTO: 345 10E9/L (ref 150–450)
RBC # BLD AUTO: 4.34 10E12/L (ref 3.8–5.2)
WBC # BLD AUTO: 7.5 10E9/L (ref 4–11)

## 2020-04-16 PROCEDURE — 88305 TISSUE EXAM BY PATHOLOGIST: CPT | Performed by: OBSTETRICS & GYNECOLOGY

## 2020-04-16 PROCEDURE — 37000009 ZZH ANESTHESIA TECHNICAL FEE, EACH ADDTL 15 MIN: Performed by: OBSTETRICS & GYNECOLOGY

## 2020-04-16 PROCEDURE — 85027 COMPLETE CBC AUTOMATED: CPT | Performed by: OBSTETRICS & GYNECOLOGY

## 2020-04-16 PROCEDURE — 40000305 ZZH STATISTIC PRE PROC ASSESS I: Performed by: OBSTETRICS & GYNECOLOGY

## 2020-04-16 PROCEDURE — 25800030 ZZH RX IP 258 OP 636: Performed by: NURSE ANESTHETIST, CERTIFIED REGISTERED

## 2020-04-16 PROCEDURE — 99213 OFFICE O/P EST LOW 20 MIN: CPT | Performed by: OBSTETRICS & GYNECOLOGY

## 2020-04-16 PROCEDURE — 76857 US EXAM PELVIC LIMITED: CPT

## 2020-04-16 PROCEDURE — 25000125 ZZHC RX 250: Performed by: OBSTETRICS & GYNECOLOGY

## 2020-04-16 PROCEDURE — 71000027 ZZH RECOVERY PHASE 2 EACH 15 MINS: Performed by: OBSTETRICS & GYNECOLOGY

## 2020-04-16 PROCEDURE — 59820 CARE OF MISCARRIAGE: CPT | Performed by: OBSTETRICS & GYNECOLOGY

## 2020-04-16 PROCEDURE — 25000128 H RX IP 250 OP 636: Performed by: OBSTETRICS & GYNECOLOGY

## 2020-04-16 PROCEDURE — 27210794 ZZH OR GENERAL SUPPLY STERILE: Performed by: OBSTETRICS & GYNECOLOGY

## 2020-04-16 PROCEDURE — 37000008 ZZH ANESTHESIA TECHNICAL FEE, 1ST 30 MIN: Performed by: OBSTETRICS & GYNECOLOGY

## 2020-04-16 PROCEDURE — 25000132 ZZH RX MED GY IP 250 OP 250 PS 637: Performed by: OBSTETRICS & GYNECOLOGY

## 2020-04-16 PROCEDURE — 85018 HEMOGLOBIN: CPT | Performed by: NURSE PRACTITIONER

## 2020-04-16 PROCEDURE — 25000125 ZZHC RX 250: Performed by: NURSE ANESTHETIST, CERTIFIED REGISTERED

## 2020-04-16 PROCEDURE — 99214 OFFICE O/P EST MOD 30 MIN: CPT | Performed by: NURSE PRACTITIONER

## 2020-04-16 PROCEDURE — 88305 TISSUE EXAM BY PATHOLOGIST: CPT | Mod: 26 | Performed by: OBSTETRICS & GYNECOLOGY

## 2020-04-16 PROCEDURE — 36415 COLL VENOUS BLD VENIPUNCTURE: CPT | Performed by: NURSE PRACTITIONER

## 2020-04-16 PROCEDURE — 36000050 ZZH SURGERY LEVEL 2 1ST 30 MIN: Performed by: OBSTETRICS & GYNECOLOGY

## 2020-04-16 PROCEDURE — 00000159 ZZHCL STATISTIC H-SEND OUTS PREP: Performed by: OBSTETRICS & GYNECOLOGY

## 2020-04-16 PROCEDURE — 25000128 H RX IP 250 OP 636: Performed by: NURSE ANESTHETIST, CERTIFIED REGISTERED

## 2020-04-16 RX ORDER — SODIUM CHLORIDE, SODIUM LACTATE, POTASSIUM CHLORIDE, CALCIUM CHLORIDE 600; 310; 30; 20 MG/100ML; MG/100ML; MG/100ML; MG/100ML
INJECTION, SOLUTION INTRAVENOUS CONTINUOUS
Status: DISCONTINUED | OUTPATIENT
Start: 2020-04-16 | End: 2020-04-16 | Stop reason: HOSPADM

## 2020-04-16 RX ORDER — ONDANSETRON 2 MG/ML
4 INJECTION INTRAMUSCULAR; INTRAVENOUS EVERY 30 MIN PRN
Status: CANCELLED | OUTPATIENT
Start: 2020-04-16

## 2020-04-16 RX ORDER — ACETAMINOPHEN 325 MG/1
975 TABLET ORAL ONCE
Status: COMPLETED | OUTPATIENT
Start: 2020-04-16 | End: 2020-04-16

## 2020-04-16 RX ORDER — ONDANSETRON 4 MG/1
4 TABLET, ORALLY DISINTEGRATING ORAL EVERY 30 MIN PRN
Status: CANCELLED | OUTPATIENT
Start: 2020-04-16

## 2020-04-16 RX ORDER — PROPOFOL 10 MG/ML
INJECTION, EMULSION INTRAVENOUS CONTINUOUS PRN
Status: DISCONTINUED | OUTPATIENT
Start: 2020-04-16 | End: 2020-04-16

## 2020-04-16 RX ORDER — ONDANSETRON 2 MG/ML
INJECTION INTRAMUSCULAR; INTRAVENOUS PRN
Status: DISCONTINUED | OUTPATIENT
Start: 2020-04-16 | End: 2020-04-16

## 2020-04-16 RX ORDER — ONDANSETRON 4 MG/1
4 TABLET, ORALLY DISINTEGRATING ORAL
Status: CANCELLED | OUTPATIENT
Start: 2020-04-16

## 2020-04-16 RX ORDER — FENTANYL CITRATE 50 UG/ML
INJECTION, SOLUTION INTRAMUSCULAR; INTRAVENOUS PRN
Status: DISCONTINUED | OUTPATIENT
Start: 2020-04-16 | End: 2020-04-16

## 2020-04-16 RX ORDER — HYDROMORPHONE HYDROCHLORIDE 1 MG/ML
.3-.5 INJECTION, SOLUTION INTRAMUSCULAR; INTRAVENOUS; SUBCUTANEOUS EVERY 10 MIN PRN
Status: CANCELLED | OUTPATIENT
Start: 2020-04-16

## 2020-04-16 RX ORDER — LIDOCAINE 40 MG/G
CREAM TOPICAL
Status: DISCONTINUED | OUTPATIENT
Start: 2020-04-16 | End: 2020-04-16 | Stop reason: HOSPADM

## 2020-04-16 RX ORDER — NALOXONE HYDROCHLORIDE 0.4 MG/ML
.1-.4 INJECTION, SOLUTION INTRAMUSCULAR; INTRAVENOUS; SUBCUTANEOUS
Status: CANCELLED | OUTPATIENT
Start: 2020-04-16 | End: 2020-04-17

## 2020-04-16 RX ORDER — MEPERIDINE HYDROCHLORIDE 50 MG/ML
12.5 INJECTION INTRAMUSCULAR; INTRAVENOUS; SUBCUTANEOUS
Status: CANCELLED | OUTPATIENT
Start: 2020-04-16

## 2020-04-16 RX ORDER — DEXAMETHASONE SODIUM PHOSPHATE 4 MG/ML
INJECTION, SOLUTION INTRA-ARTICULAR; INTRALESIONAL; INTRAMUSCULAR; INTRAVENOUS; SOFT TISSUE PRN
Status: DISCONTINUED | OUTPATIENT
Start: 2020-04-16 | End: 2020-04-16

## 2020-04-16 RX ORDER — ACETAMINOPHEN 325 MG/1
650 TABLET ORAL
Status: CANCELLED | OUTPATIENT
Start: 2020-04-16

## 2020-04-16 RX ORDER — LIDOCAINE HYDROCHLORIDE 20 MG/ML
INJECTION, SOLUTION INFILTRATION; PERINEURAL PRN
Status: DISCONTINUED | OUTPATIENT
Start: 2020-04-16 | End: 2020-04-16

## 2020-04-16 RX ORDER — ONDANSETRON 4 MG/1
4-8 TABLET, ORALLY DISINTEGRATING ORAL EVERY 8 HOURS PRN
Qty: 4 TABLET | Refills: 0 | Status: SHIPPED | OUTPATIENT
Start: 2020-04-16 | End: 2020-04-29

## 2020-04-16 RX ORDER — HYDROCODONE BITARTRATE AND ACETAMINOPHEN 5; 325 MG/1; MG/1
1 TABLET ORAL
Status: CANCELLED | OUTPATIENT
Start: 2020-04-16

## 2020-04-16 RX ORDER — PROPOFOL 10 MG/ML
INJECTION, EMULSION INTRAVENOUS PRN
Status: DISCONTINUED | OUTPATIENT
Start: 2020-04-16 | End: 2020-04-16

## 2020-04-16 RX ORDER — OXYCODONE HCL 5 MG/5 ML
5 SOLUTION, ORAL ORAL EVERY 4 HOURS PRN
Status: CANCELLED | OUTPATIENT
Start: 2020-04-16

## 2020-04-16 RX ORDER — KETOROLAC TROMETHAMINE 30 MG/ML
INJECTION, SOLUTION INTRAMUSCULAR; INTRAVENOUS PRN
Status: DISCONTINUED | OUTPATIENT
Start: 2020-04-16 | End: 2020-04-16

## 2020-04-16 RX ORDER — FENTANYL CITRATE 50 UG/ML
25-50 INJECTION, SOLUTION INTRAMUSCULAR; INTRAVENOUS
Status: CANCELLED | OUTPATIENT
Start: 2020-04-16

## 2020-04-16 RX ORDER — IBUPROFEN 600 MG/1
600 TABLET, FILM COATED ORAL EVERY 6 HOURS PRN
Qty: 20 TABLET | Refills: 0 | Status: SHIPPED | OUTPATIENT
Start: 2020-04-16 | End: 2020-04-29

## 2020-04-16 RX ORDER — DOXYCYCLINE 100 MG/10ML
100 INJECTION, POWDER, LYOPHILIZED, FOR SOLUTION INTRAVENOUS
Status: COMPLETED | OUTPATIENT
Start: 2020-04-16 | End: 2020-04-16

## 2020-04-16 RX ORDER — SODIUM CHLORIDE, SODIUM LACTATE, POTASSIUM CHLORIDE, CALCIUM CHLORIDE 600; 310; 30; 20 MG/100ML; MG/100ML; MG/100ML; MG/100ML
INJECTION, SOLUTION INTRAVENOUS CONTINUOUS
Status: CANCELLED | OUTPATIENT
Start: 2020-04-16

## 2020-04-16 RX ADMIN — PROPOFOL 150 MCG/KG/MIN: 10 INJECTION, EMULSION INTRAVENOUS at 13:28

## 2020-04-16 RX ADMIN — KETOROLAC TROMETHAMINE 30 MG: 30 INJECTION, SOLUTION INTRAMUSCULAR at 13:46

## 2020-04-16 RX ADMIN — SODIUM CHLORIDE, POTASSIUM CHLORIDE, SODIUM LACTATE AND CALCIUM CHLORIDE: 600; 310; 30; 20 INJECTION, SOLUTION INTRAVENOUS at 13:25

## 2020-04-16 RX ADMIN — LIDOCAINE HYDROCHLORIDE 60 MG: 20 INJECTION, SOLUTION INFILTRATION; PERINEURAL at 13:27

## 2020-04-16 RX ADMIN — LIDOCAINE HYDROCHLORIDE 1 ML: 10 INJECTION, SOLUTION EPIDURAL; INFILTRATION; INTRACAUDAL; PERINEURAL at 13:12

## 2020-04-16 RX ADMIN — DOXYCYCLINE 100 MG: 100 INJECTION, POWDER, LYOPHILIZED, FOR SOLUTION INTRAVENOUS at 13:25

## 2020-04-16 RX ADMIN — ACETAMINOPHEN 975 MG: 325 TABLET, FILM COATED ORAL at 12:43

## 2020-04-16 RX ADMIN — ONDANSETRON 4 MG: 2 INJECTION INTRAMUSCULAR; INTRAVENOUS at 13:28

## 2020-04-16 RX ADMIN — SODIUM CHLORIDE, POTASSIUM CHLORIDE, SODIUM LACTATE AND CALCIUM CHLORIDE: 600; 310; 30; 20 INJECTION, SOLUTION INTRAVENOUS at 13:11

## 2020-04-16 RX ADMIN — DEXAMETHASONE SODIUM PHOSPHATE 4 MG: 4 INJECTION, SOLUTION INTRAMUSCULAR; INTRAVENOUS at 13:30

## 2020-04-16 RX ADMIN — PROPOFOL 50 MG: 10 INJECTION, EMULSION INTRAVENOUS at 13:27

## 2020-04-16 RX ADMIN — FENTANYL CITRATE 25 MCG: 50 INJECTION, SOLUTION INTRAMUSCULAR; INTRAVENOUS at 13:34

## 2020-04-16 ASSESSMENT — PAIN SCALES - GENERAL: PAINLEVEL: NO PAIN (0)

## 2020-04-16 ASSESSMENT — MIFFLIN-ST. JEOR: SCORE: 1208.93

## 2020-04-16 NOTE — PATIENT INSTRUCTIONS
Labs will be done today.   For normal results, you will receive a letter with the results in about 2 weeks.  If anything is abnormal or unexpected, someone from the clinic will call you.        Before Your Surgery      Call your surgeon if there is any change in your health. This includes signs of a cold or flu (such as a sore throat, runny nose, cough, rash or fever).    Do not smoke, drink alcohol or take over the counter medicine (unless your surgeon or primary care doctor tells you to) for the 24 hours before and after surgery.    If you take prescribed drugs: Follow your doctor s orders about which medicines to take and which to stop until after surgery.    Eating and drinking prior to surgery: follow the instructions from your surgeon    Take a shower or bath the night before surgery. Use the soap your surgeon gave you to gently clean your skin. If you do not have soap from your surgeon, use your regular soap. Do not shave or scrub the surgery site.  Wear clean pajamas and have clean sheets on your bed.

## 2020-04-16 NOTE — ANESTHESIA POSTPROCEDURE EVALUATION
Patient: Shayla Mcclure    Procedure(s):  DILATION AND CURETTAGE, UTERUS, USING SUCTION    Diagnosis:Retained products of conception after miscarriage [O03.4]  Diagnosis Additional Information: No value filed.    Anesthesia Type:  MAC    Note:  Anesthesia Post Evaluation    Last vitals:  Vitals:    04/16/20 1314 04/16/20 1400   BP: 115/76 100/77   Resp: 14 14   Temp: 98.1  F (36.7  C)    SpO2: 99% 97%         Electronically Signed By: CLAUDIA Shelton CRNA  April 16, 2020  2:03 PM

## 2020-04-16 NOTE — NURSING NOTE
"Chief Complaint   Patient presents with     Miscarriage     Follow Up       Initial /70 (BP Location: Right arm, Patient Position: Chair, Cuff Size: Adult Regular)   Pulse 88   Temp 98.7  F (37.1  C) (Temporal)   Wt 60.1 kg (132 lb 9.6 oz)   LMP 2020   BMI 25.80 kg/m   Estimated body mass index is 25.8 kg/m  as calculated from the following:    Height as of 3/9/20: 1.527 m (5' 0.12\").    Weight as of this encounter: 60.1 kg (132 lb 9.6 oz).  BP completed using cuff size: regular        The following HM Due: NONE      The following patient reported/Care Every where data was sent to:  P ABSTRACT QUALITY INITIATIVES [[id number]]       Genevieve Carey [facility]  2020           "

## 2020-04-16 NOTE — ANESTHESIA CARE TRANSFER NOTE
Patient: Shayla Mcclure    Procedure(s):  DILATION AND CURETTAGE, UTERUS, USING SUCTION    Diagnosis: Retained products of conception after miscarriage [O03.4]  Diagnosis Additional Information: No value filed.    Anesthesia Type:   MAC     Note:  Airway :Room Air  Patient transferred to:Phase II  Comments: Awake, comfortable, sats 99%< Report to RN.Handoff Report: Identifed the Patient, Identified the Reponsible Provider, Reviewed the pertinent medical history, Discussed the surgical course, Reviewed Intra-OP anesthesia mangement and issues during anesthesia, Set expectations for post-procedure period and Allowed opportunity for questions and acknowledgement of understanding      Vitals: (Last set prior to Anesthesia Care Transfer)    CRNA VITALS  4/16/2020 1321 - 4/16/2020 1356      4/16/2020             Pulse:  110    SpO2:  95 %                Electronically Signed By: CLAUDIA Shelton CRNA  April 16, 2020  1:56 PM

## 2020-04-16 NOTE — NURSING NOTE
Health Maintenance Due   Topic Date Due     PREVENTIVE CARE VISIT  1985     DTAP/TDAP/TD IMMUNIZATION (1 - Tdap) 04/05/1994      Brittani Goodman LPN........4/16/2020 10:27 AM

## 2020-04-16 NOTE — PROGRESS NOTES
[address] [address]  [phone]  Dept: [phone]    PRE-OP EVALUATION:  Today's date: 2020    Shayla Mcclure (: 1985) presents for pre-operative evaluation assessment as requested by Dr. Mcpherson.  She requires evaluation and anesthesia risk assessment prior to undergoing surgery/procedure for treatment of Miscarriage .    Proposed Surgery/ Procedure: DILATION AND CURETTAGE, UTERUS, USING SUCTION   Date of Surgery/ Procedure: 20  Time of Surgery/ Procedure: 130pm  Hospital/Surgical Facility: [facility]  Primary Physician: Margaret Aragon  Type of Anesthesia Anticipated: General    Patient has a Health Care Directive or Living Will:  NO    1. NO - Do you have a history of heart attack, stroke, stent, bypass or surgery on an artery in the head, neck, heart or legs?  2. NO - Do you ever have any pain or discomfort in your chest?  3. NO - Do you have a history of  Heart Failure?  4. NO - Are you troubled by shortness of breath when: walking on the level, up a slight hill or at night?  5. NO - Do you currently have a cold, bronchitis or other respiratory infection?  6. NO - Do you have a cough, shortness of breath or wheezing?  7. NO - Do you sometimes get pains in the calves of your legs when you walk?  8. NO - Do you or anyone in your family have previous history of blood clots?  9. NO - Do you or does anyone in your family have a serious bleeding problem such as prolonged bleeding following surgeries or cuts?  10. YES- Due to menstrual cycle - Have you ever had problems with anemia or been told to take iron pills?  11. YES- Vaginal bleeding - Have you had any abnormal blood loss such as black, tarry or bloody stools, or abnormal vaginal bleeding?  12. NO - Have you ever had a blood transfusion?  13. NO - Have you or any of your relatives ever had problems with anesthesia?  14. NO - Do you have sleep apnea, excessive snoring or daytime  drowsiness?  15. NO - Do you have any prosthetic heart valves?  16. NO - Do you have prosthetic joints?  17. NO - Is there any chance that you may be pregnant?      HPI:     HPI related to upcoming procedure: miscarriage 3/29/2020 with retained products of conception.  Has been following with OB, just saw Dr. Gardiner this morning and had repeat ultrasound done.  I do not have these results, but she was told she still has retained products of conception.  Plan for D&C this afternoon.           See problem list for active medical problems.  Problems all longstanding and stable, except as noted/documented.  See ROS for pertinent symptoms related to these conditions.      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     History of severe pre-eclampsia     delivered at 27w3d     Leiomyoma     prior laparotomy for two large fibroids      Past Surgical History:   Procedure Laterality Date     C/SECTION, LOW TRANSVERSE      delivered at 27w3d for pre-eclampsia with severe features      COSMETIC MAMMOPLASTY AUGMENTATION BILATERAL      when 16 years old     MYOMECTOMY UTERUS  2012    open myomectomy in [place], reported very large fibroids     Current Outpatient Medications   Medication Sig Dispense Refill     Prenatal Vit-Fe Fumarate-FA (PRENATAL MULTIVITAMIN W/IRON) 27-0.8 MG tablet Take 1 tablet by mouth daily 90 tablet 3     Multiple Vitamins-Minerals (MULTIVITAMIN PO)        OTC products: None, except as noted above    Allergies   Allergen Reactions     Clindamycin       Latex Allergy: NO    Social History     Tobacco Use     Smoking status: Never Smoker     Smokeless tobacco: Never Used   Substance Use Topics  "    Alcohol use: Yes     Comment: occasionally     History   Drug Use No       REVIEW OF SYSTEMS:   CONSTITUTIONAL: NEGATIVE for fever, chills, change in weight  INTEGUMENTARY/SKIN: NEGATIVE for worrisome rashes, moles or lesions  EYES: NEGATIVE for vision changes or irritation  ENT/MOUTH: NEGATIVE for ear, mouth and throat problems  RESP: NEGATIVE for significant cough or SOB  BREAST: NEGATIVE for masses, tenderness or discharge  CV: NEGATIVE for chest pain, palpitations or peripheral edema  GI: NEGATIVE for nausea, abdominal pain, heartburn, or change in bowel habits  : NEGATIVE for frequency, dysuria, or hematuria  MUSCULOSKELETAL: NEGATIVE for significant arthralgias or myalgia  NEURO: NEGATIVE for weakness, dizziness or paresthesias  ENDOCRINE: NEGATIVE for temperature intolerance, skin/hair changes  HEME: NEGATIVE for bleeding problems  PSYCHIATRIC: NEGATIVE for changes in mood or affect    EXAM:   /70 (BP Location: Right arm, Patient Position: Sitting, Cuff Size: Adult Regular)   Pulse 84   Temp 97.9  F (36.6  C) (Temporal)   Resp 14   Ht 1.527 m (5' 0.12\")   Wt 60.1 kg (132 lb 6.4 oz)   LMP 01/21/2020   SpO2 99%   Breastfeeding No   BMI 25.76 kg/m      GENERAL APPEARANCE: healthy, alert and no distress     EYES: EOMI, PERRL     HENT: ear canals and TM's normal and nose and mouth without ulcers or lesions     NECK: no adenopathy, no asymmetry, masses, or scars and thyroid normal to palpation     RESP: lungs clear to auscultation - no rales, rhonchi or wheezes     CV: regular rates and rhythm, normal S1 S2, no S3 or S4 and no murmur, click or rub     ABDOMEN:  soft, nontender, no HSM or masses and bowel sounds normal     MS: extremities normal- no gross deformities noted, no evidence of inflammation in joints, FROM in all extremities.     SKIN: no suspicious lesions or rashes     NEURO: Normal strength and tone, sensory exam grossly normal, mentation intact and speech normal     PSYCH: " mentation appears normal. and affect normal/bright     LYMPHATICS: No cervical adenopathy    DIAGNOSTICS:       Recent Labs   Lab Test 03/29/20  0240 03/09/20  1055 12/31/19  1151   HGB 13.0 13.2 14.2    363 326   INR 0.93  --   --      --  136   POTASSIUM 3.0*  --  4.3   CR 0.51* 0.46* 0.56        IMPRESSION:   Reason for surgery/procedure: retained products of conception    The proposed surgical procedure is considered LOW risk.    REVISED CARDIAC RISK INDEX  The patient has the following serious cardiovascular risks for perioperative complications such as (MI, PE, VFib and 3  AV Block):  No serious cardiac risks  INTERPRETATION: 0 risks: Class I (very low risk - 0.4% complication rate)    The patient has the following additional risks for perioperative complications:  No identified additional risks      ICD-10-CM    1. Preop general physical exam  Z01.818 Hemoglobin   2. Retained products of conception after miscarriage  O03.4 Hemoglobin       RECOMMENDATIONS:         --Patient is to take all scheduled medications on the day of surgery EXCEPT for modifications listed below.    APPROVAL GIVEN to proceed with proposed procedure, without further diagnostic evaluation       Signed Electronically by: CLAUDIA Garvin CNP    Copy of this evaluation report is provided to requesting physician.    [facility] Preop Guidelines    Revised Cardiac Risk Index

## 2020-04-16 NOTE — PROGRESS NOTES
Clinic Note    CC:    Chief Complaint   Patient presents with     Miscarriage     Follow Up      HPI:  Shayla Mcclure is a 34 year old  woman with a missed  diagnosed by Ultrasound on 3/23 at 8w6d, with cramping and minimal spotting at that time. She chose expectant management at that time. She presented to the ED on 3/29 with severe cramping and increased bleeding, Ultrasound showed likely completed AB, and she received Rhogam at that time. However, Ultrasound repeated on  for ongoing bleeding and cramping, with retained POCs at that time. Options were reviewed and she elected to have a suction D&C under anesthesia, scheduled for today. She reports persistent though improving cramping and bleeding. Repeat Ultrasound this AM showing persistent retained POCs. We will proceed with the planned procedure. We reviewed the option for genetic testing on the POCs, she is declining this option.      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 Menarche age 10, regular monthly periods are heavy, painful, last 8-10 days                 Last pap NIL, HPV- ()  PMH:   Past Medical History:   Diagnosis Date     Carrier Scooter syndrome (H)     affected child     History of severe pre-eclampsia     delivered at 27w3d     Leiomyoma     prior laparotomy for two large fibroids      SurgHx:   Past Surgical History:   Procedure Laterality Date     C/SECTION, LOW TRANSVERSE      delivered at 27w3d for pre-eclampsia with severe features      COSMETIC MAMMOPLASTY AUGMENTATION BILATERAL      when 16 years old     MYOMECTOMY UTERUS  2012    open myomectomy in [place], reported very large fibroids     FamHx:   Family History    Problem Relation Age of Onset     Other - See Comments Mother         heavy menstrual cycles     Hashimoto's thyroiditis Mother      Lupus Father      Lupus Maternal Aunt      Leukemia Maternal Grandmother      Cancer No family hx of         Scooter's Syndrome     SocHx:   Social History     Socioeconomic History     Marital status:      Spouse name: None     Number of children: None     Years of education: None     Highest education level: None   Occupational History     Occupation: homemaker   Social Needs     Financial resource strain: None     Food insecurity     Worry: None     Inability: None     Transportation needs     Medical: None     Non-medical: None   Tobacco Use     Smoking status: Never Smoker     Smokeless tobacco: Never Used   Substance and Sexual Activity     Alcohol use: Yes     Comment: occasionally     Drug use: No     Sexual activity: Yes     Partners: Male     Birth control/protection: None   Lifestyle     Physical activity     Days per week: None     Minutes per session: None     Stress: None   Relationships     Social connections     Talks on phone: None     Gets together: None     Attends Christian service: None     Active member of club or organization: None     Attends meetings of clubs or organizations: None     Relationship status: None     Intimate partner violence     Fear of current or ex partner: None     Emotionally abused: None     Physically abused: None     Forced sexual activity: None   Other Topics Concern     None   Social History Narrative    Patient and her  moved to [place] (from [place] via [place]) for their daughter with Scooter's Syndrome.     Very happy in [place].     Silvia Leyva     Allergies:   Clindamycin  Current Medications:   Current Outpatient Medications   Medication Sig Dispense Refill     Prenatal Vit-Fe Fumarate-FA (PRENATAL MULTIVITAMIN W/IRON) 27-0.8 MG tablet Take 1 tablet by mouth daily 90 tablet 3     Multiple Vitamins-Minerals (MULTIVITAMIN  PO)        ROS: 10 point ROS negative other than as noted in the HPI    EXAM:  Vitals:    04/16/20 0952   BP: 110/70   BP Location: Right arm   Patient Position: Chair   Cuff Size: Adult Regular   Pulse: 88   Temp: 98.7  F (37.1  C)   TempSrc: Temporal   Weight: 60.1 kg (132 lb 9.6 oz)    Body mass index is 25.8 kg/m .    Gen: Alert, oriented, appropriately interactive, NAD  CV: RRR  Resp: CTAB, good effort without distress   Abdomen: soft, non tender, non distended, no masses  MSK: normal gait, symmetric movements UE & LE  Lower extremities: non-tender, no edema    Labs & Imaging:  The uterus measures 10.9 x 4.5 x 6.2, and there is no  evidence of a focal fibroid. The endometrial stripe is thin near the  fundus measuring up to 11 mm, previously measuring up to 18 mm. There  is residual thickening and heterogenous appearance of the endometrium  in the lower uterine segment measuring up to 17 mm. No increased color  Doppler flow is seen in the region of thickening and heterogeneity in  the lower uterine segment. There is no free fluid in the pelvis.   The right ovary measures 3.3 x 2.2 x 2.6 and the left ovary measures  5.1 x 3.3 x 4.1. A small round simple appearing hypoechoic cystic  structures seen in the right ovary measuring 2.1 x 1.6 x 1.2 cm,  compatible with a dominant follicle. A round heterogenous hypoechoic  cystic structure is also seen in the left ovary measuring 3.3 x 2.7 x  2.5 cm, with minimal internal echogenic debris.                                                              IMPRESSION:   1. Interval improvement in the appearance of the endometrial stripe  when compared to prior pelvic ultrasound from 4/6/2020.  2. Mild residual thickening and heterogeneity of the endometrial  stripe in the lower uterine segment without increased color Doppler  flow, possibly representing residual avascular products of conception.  3. Small minimally complex left ovarian cyst.    ASSESSMENT/PLAN: Shayla cook a  34 year old  woman with a missed  diagnosed by Ultrasound on 3/23 at 8w6d, presents for suction D&C    1. Missed ab  - Expectant management chosen with ongoing bleeding and cramping since 3/23, serial Ultrasounds with likely retained POCs on second Ultrasound, repeat today with possible residual POCs and some interval improvement in the EMS. This process has been very stressful for the patient and her family, especially with a disabled child at home and the Covid pandemic. She states that in hindsight, she would have had the D&C on diagnosis, and would like to proceed with the procure today for definitive management.   - Preop appt to immediately follow this visit, plan for scheduled D&C this afternoon.     Rod Gardiner MD   2020 10:32 AM

## 2020-04-16 NOTE — OP NOTE
Operative Note   Name: Shayla Mcclure  MRN:[id number]  : 1985  Date of Surgery: 2020    Pre-operative Diagnosis:   Missed AB  Rh neg  Hx of open myomectomy  Carrier, Scooter syndrome with affected child  Post-operative Diagnosis: Same  Procedure(s): Suction Dilation & Curettage    Surgeon: Rod Gardiner MD,    Anesthesia: MAC  EBL: 5 mL     Specimens: POCs  Complications: None apparent.  Findings: Normal external genitalia, vaginal, cervix. Mildly enlarged midline anteverted mobile uterus, no adnexal masses. POCs appreciated in suction tubing during procedure, gritty endometrium on all aspects at close of procedure.     Indications: Shayla Mcculre is a 34 year old  woman with a missed  diagnosed by Ultrasound on 3/23 at 8w6d, with cramping and minimal spotting at that time. She chose expectant management at that time. She presented to the ED on 3/29 with severe cramping and increased bleeding, Ultrasound showed likely completed AB, and she received Rhogam at that time. However, Ultrasound repeated on  for ongoing bleeding and cramping, with retained POCs seen at that time. Options were reviewed and she elected to have a suction D&C under anesthesia, scheduled for today. She reports persistent though improving cramping and bleeding. Repeat Ultrasound this AM showing possible residual POCs and some interval improvement in the EMS. This process has been very stressful for the patient and her family, especially with a disabled child at home and the Covid pandemic. She states that in hindsight, she would have had the D&C on diagnosis, and would like to proceed with the procure today for definitive management. We reviewed the option for genetic testing on the POCs, she is declining this option. She was counseled on the risks, benefits, and alternatives of the procedure, and she consented.   Procedure: The patient was taken to the operating room where she underwent MAC anesthesia without difficulty.  She was placed in the dorsal lithotomy position. An examination was done with findings noted above. She was prepped and draped in the usual sterile fashion. A sterile speculum was inserted into the vagina.   A single tooth tenaculum was placed at 12 o'clock. 20cc of 0.25% marcaine with epi was injected at 4 o'clock and 8 o'clock to create a paracervical block.   The cervix was serially dilated to 25 using Frederick dilators. An 8 mm suction curette was advanced gently to the uterine fundus. The suction device was activated and the curette rotated to clear the uterus of products of conception. Two more passes of the suction curettage were performed. With the final pass, a gritty texture noted in the uterine cavity. There was minimal bleeding noted and the tenaculum removed with good hemostasis noted. The patient tolerated the procedure well and was taken to the recovery area in stable condition.   Rod Gardiner MD  04/16/2020, 1:23 PM

## 2020-04-16 NOTE — ANESTHESIA POSTPROCEDURE EVALUATION
Patient: Shayla Mcclure    Procedure(s):  DILATION AND CURETTAGE, UTERUS, USING SUCTION    Diagnosis:Retained products of conception after miscarriage [O03.4]  Diagnosis Additional Information: No value filed.    Anesthesia Type:  MAC    Note:  Anesthesia Post Evaluation    Last vitals:  Vitals:    04/16/20 1314 04/16/20 1400   BP: 115/76 100/77   Resp: 14 14   Temp: 98.1  F (36.7  C)    SpO2: 99% 97%         Electronically Signed By: CLAUDIA Shelton CRNA  April 16, 2020  2:02 PM

## 2020-04-16 NOTE — DISCHARGE INSTRUCTIONS
[facility]  Discharge Instruction Following Hysteroscopy D&C  Dr. Gardiner  Specialty Clinic: [phone]      You must be on pelvic rest for 2 weeks, this consists of no tampons, no douching and no intercourse.     If you have unusually heavy vaginal bleeding, severe nausea with vomiting, or increased pain, or fever, please contact Dr. Gardiner s RN Mon[place] at [phone], or go to the emergency room if after hours.    Shower on post-op day 1. No baths/pools/hot tubs/lakes for  2 weeks.     When to contact your surgeon:  -Nausea/Vomiting  -Increasing intolerable pain  -Anything other you feel important.     [facility]   Adult Discharge Orders & Instructions     For 24 hours after surgery    1. Get plenty of rest.  A responsible adult must stay with you for at least 24 hours after you leave the hospital.   2. Do not drive or use heavy equipment.  If you have weakness or tingling, don't drive or use heavy equipment until this feeling goes away.  3. Do not drink alcohol.  4. Avoid strenuous or risky activities.  Ask for help when climbing stairs.   5. You may feel lightheaded.  IF so, sit for a few minutes before standing.  Have someone help you get up.   6. If you have nausea (feel sick to your stomach): Drink only clear liquids such as apple juice, ginger ale, broth or 7-Up.  Rest may also help.  Be sure to drink enough fluids.  Move to a regular diet as you feel able.  7. You may have a slight fever. Call the doctor if your fever is over 100 F (37.7 C) (taken under the tongue) or lasts longer than 24 hours.  8. You may have a dry mouth, a sore throat, muscle aches or trouble sleeping.  These should go away after 24 hours.  9. Do not make important or legal decisions.   Call your doctor for any of the followin.  Signs of infection (fever, growing tenderness at the surgery site, a large amount of drainage or bleeding, severe pain, foul-smelling drainage, redness, swelling).    2. It  has been over 8 to 10 hours since surgery and you are still not able to urinate (pass water).    To contact a nurse 24/7, call ________________________________________

## 2020-04-16 NOTE — TELEPHONE ENCOUNTER
I called the patient. No further bleeding following passing tissue last night. She will come to my clinic this AM after her Ultrasound to discuss next steps.

## 2020-04-17 VITALS
TEMPERATURE: 98.7 F | BODY MASS INDEX: 25.8 KG/M2 | SYSTOLIC BLOOD PRESSURE: 110 MMHG | HEART RATE: 88 BPM | DIASTOLIC BLOOD PRESSURE: 70 MMHG | WEIGHT: 132.6 LBS

## 2020-04-17 NOTE — OR NURSING
[facility]  Discharge Call Back  Shayla Mcclure  1985  MRN: [id number]  Home: [phone] (home)   PCP: Margaret Aragon    We are calling to see how you're doing since your surgery/procedure with us?   Comments: well  Clinical Questions  1. Have you had time to look at your discharge instructions? Do you have any questions in regards to the instructions?   Comment: yes no  2. Do you feel your pain is being controlled with the regimen the surgeon sent you home on? (ie: prescription medications, over the counter pain medications, ice packs)   Comments: yes  3. Have you noticed any drainage on your dressing? Do you know what to do if you have bleeding as a result of your procedure?   Comments: minimal yes  4. Have you had any nausea/vomiting? Do you know how to treat this?   Comment: no yes   5. Have you had any signs/symptoms of infection? (ie: fever, swelling, heat, drainage or redness) Do you know what to do if you have?   Comment: no yes   6. Do you have a follow up appointment made with your surgeon? Do you have a number to contact them at if you need it?   Comment: yes  Retained Foreign Object (IVANA, Hemovac, Penrose, Wound Packing, Vaginal Packing, Nasal Splints, Urethral Stents, Paulson Catheter)  1. Do you still have NA in place?   2. If the item is still in place, can you review the plan for removal with me? NA      You may be randomly selected to fill out a [facility] survey. We would appreciate you taking the time to fill this out. It is important to us if you would answer all of the questions on the survey.

## 2020-04-20 LAB — COPATH REPORT: NORMAL

## 2020-04-29 ENCOUNTER — VIRTUAL VISIT (OUTPATIENT)
Dept: OBGYN | Facility: CLINIC | Age: 35
End: 2020-04-29
Payer: COMMERCIAL

## 2020-04-29 DIAGNOSIS — Z98.890 POSTOPERATIVE STATE: Primary | ICD-10-CM

## 2020-04-29 PROCEDURE — 99024 POSTOP FOLLOW-UP VISIT: CPT | Performed by: OBSTETRICS & GYNECOLOGY

## 2020-04-29 NOTE — PROGRESS NOTES
"Shayla Mcclure is a 35 year old female who is being evaluated via a billable telephone visit.      The patient has been notified of following:     \"This telephone visit will be conducted via a call between you and your physician/provider. We have found that certain health care needs can be provided without the need for a physical exam.  This service lets us provide the care you need with a short phone conversation.  If a prescription is necessary we can send it directly to your pharmacy.  If lab work is needed we can place an order for that and you can then stop by our lab to have the test done at a later time.    Telephone visits are billed at different rates depending on your insurance coverage. During this emergency period, for some insurers they may be billed the same as an in-person visit.  Please reach out to your insurance provider with any questions.    If during the course of the call the physician/provider feels a telephone visit is not appropriate, you will not be charged for this service.\"    Patient has given verbal consent for Telephone visit?  Yes    [facility]    Phone call duration: 12 minutes      Postop phone visit    S: Shayla Mcclure is a 35 year old  here for post-operative visit following a suction D&C on  for a MAB, the procedure was uncomplicated with pathology confirming POCs. She is doing well, did have some watery discharge and mild cramps following the procedure. No current symptoms.     Current Outpatient Medications   Medication Sig Dispense Refill     ibuprofen (ADVIL/MOTRIN) 600 MG tablet Take 1 tablet (600 mg) by mouth every 6 hours as needed for other (mild and/or inflammatory pain) (Patient not taking: Reported on 2020) 20 tablet 0     Multiple Vitamins-Minerals (MULTIVITAMIN PO)        ondansetron (ZOFRAN-ODT) 4 MG ODT tab Take 1-2 tablets (4-8 mg) by mouth every 8 hours as needed for nausea (Patient not taking: Reported on 2020) " 4 tablet 0     Prenatal Vit-Fe Fumarate-FA (PRENATAL MULTIVITAMIN W/IRON) 27-0.8 MG tablet Take 1 tablet by mouth daily (Patient not taking: Reported on 2020) 90 tablet 3     Allergies   Allergen Reactions     Clindamycin      Past Medical History:   Diagnosis Date     Carrier Scooter syndrome (H)     affected child     History of severe pre-eclampsia     delivered at 27w3d     Leiomyoma     prior laparotomy for two large fibroids      Past Surgical History:   Procedure Laterality Date     C/SECTION, LOW TRANSVERSE      delivered at 27w3d for pre-eclampsia with severe features      COSMETIC MAMMOPLASTY AUGMENTATION BILATERAL      when 16 years old     DILATION AND CURETTAGE SUCTION N/A 2020    Procedure: DILATION AND CURETTAGE, UTERUS, USING SUCTION;  Surgeon: Haydee Mcpherson DO;  Location: [facility] OR     MYOMECTOMY UTERUS      open myomectomy in [place], reported very large fibroids     O:   There were no vitals taken for this visit.  Gen: Alert, oriented, appropriately interactive, NAD    Labs:   Hemoglobin   Date Value Ref Range Status   2020 13.0 11.7 - 15.7 g/dL Final   2020 13.2 11.7 - 15.7 g/dL Final     Pathology:   Copath Report   Date Value Ref Range Status   2020   Final    Patient Name: LINDA CAMPOS  MR#: [id number]  Specimen #: [id number]  Collected: 2020  Received: 2020  Reported: 2020 09:58  Ordering Phy(s): HEATHER LAWSON    For improved result formatting, select 'View Enhanced Report Format' under   Linked Documents section.    SPECIMEN(S):  Products of conception    FINAL DIAGNOSIS:  Uterus, products of conception, dilation and curettage  - Fragments of decidua, chorionic villi, and implantation site consistent   with intrauterine products of  conception    Electronically signed out by:    Dory Marks M.D.    CLINICAL HISTORY:  35-year-old, missed     GROSS:  The specimen is received in formalin with the proper patient information,  "  labeled \"products of conception\".  The specimen consists of a 4.2 x 3.1 x 1.1 cm aggregate of tan-pink   membranous soft tissue and clotted blood.  No fetal parts are grossly appreciated. Representative sections are   submitted in 3 cassettes. Note: The  remaining tissue is wrapped and saved per [place] POC protocol. (Dictated   by: Tex Cohen 2020 12:26  PM)    MICROSCOPIC:  The microscopic findings support the diagnosis.    The technical component of this testing was completed at the [facility], with the professional component performed   at the [facility], [address] [place] [address]  [phone] ([phone])    CPT Codes:  A: 50199-QW[address], [facility]    COLLECTION SITE:  Client: Counts include 234 beds at the [facility]  Location: [facility] (P)       A: Shayla Mcclure is a 35 year old  here for post-operative visit following a suction D&C on  for a MAB, the procedure was uncomplicated with pathology confirming POCs.  Doing well, no concerns.  S/p Rhogam.     P: Review pathology and hemoglobin  Discussed contraception again, she declines, she understands that she is fertile.     Rod Gardiner MD  OB/GYN  2020, 1:10 PM         "

## 2020-06-20 DIAGNOSIS — Z20.822 EXPOSURE TO COVID-19 VIRUS: Primary | ICD-10-CM

## 2020-06-24 DIAGNOSIS — Z20.822 EXPOSURE TO COVID-19 VIRUS: ICD-10-CM

## 2020-06-24 PROCEDURE — U0003 INFECTIOUS AGENT DETECTION BY NUCLEIC ACID (DNA OR RNA); SEVERE ACUTE RESPIRATORY SYNDROME CORONAVIRUS 2 (SARS-COV-2) (CORONAVIRUS DISEASE [COVID-19]), AMPLIFIED PROBE TECHNIQUE, MAKING USE OF HIGH THROUGHPUT TECHNOLOGIES AS DESCRIBED BY CMS-2020-01-R: HCPCS | Performed by: PHYSICIAN ASSISTANT

## 2020-06-25 LAB
SARS-COV-2 RNA SPEC QL NAA+PROBE: NOT DETECTED
SPECIMEN SOURCE: NORMAL

## 2020-10-14 NOTE — PROGRESS NOTES
SUBJECTIVE:   CC: Shayla Mcclure is an 35 year old woman who presents for preventive health visit.     Patient has been advised of split billing requirements and indicates understanding: Yes  Healthy Habits:     Getting at least 3 servings of Calcium per day:  Yes    Bi-annual eye exam:  NO    Dental care twice a year:  NO    Sleep apnea or symptoms of sleep apnea:  None    Diet:  Regular (no restrictions)    Frequency of exercise:  1 day/week    Duration of exercise:  15-30 minutes    Taking medications regularly:  Yes    Medication side effects:  None    PHQ-2 Total Score: 0    Additional concerns today:  No    Had breast implants placed when she was 16- worries this are making her sick. Would like them removed.    Has family history of multiple auto-immune things. Would like to repeat a few tests she has had in the past. Has noticed joints painful. Also has hives and swelling/redness of fingers and toes.     Today's PHQ-2 Score:   PHQ-2 ( 1999 Pfizer) 10/15/2020   Q1: Little interest or pleasure in doing things 0   Q2: Feeling down, depressed or hopeless 0   PHQ-2 Score 0   Q1: Little interest or pleasure in doing things Not at all   Q2: Feeling down, depressed or hopeless Not at all   PHQ-2 Score 0       Abuse: Current or Past (Physical, Sexual or Emotional) - No  Do you feel safe in your environment? Yes        Social History     Tobacco Use     Smoking status: Never Smoker     Smokeless tobacco: Never Used   Substance Use Topics     Alcohol use: Yes     Comment: occasionally         Alcohol Use 10/15/2020   Prescreen: >3 drinks/day or >7 drinks/week? No       Reviewed orders with patient.  Reviewed health maintenance and updated orders accordingly - Yes  BP Readings from Last 3 Encounters:   10/15/20 104/60   04/16/20 116/77   04/16/20 106/70    Wt Readings from Last 3 Encounters:   10/15/20 61.7 kg (136 lb)   04/16/20 60.1 kg (132 lb 6.4 oz)   04/16/20 60.1 kg (132 lb 9.6 oz)                  Patient Active  Problem List   Diagnosis     Menorrhagia     Retained products of conception after miscarriage     Past Surgical History:   Procedure Laterality Date     C/SECTION, LOW TRANSVERSE      delivered at 27w3d for pre-eclampsia with severe features      COSMETIC MAMMOPLASTY AUGMENTATION BILATERAL      when 16 years old     DILATION AND CURETTAGE SUCTION N/A 4/16/2020    Procedure: DILATION AND CURETTAGE, UTERUS, USING SUCTION;  Surgeon: Haydee Mcpherson DO;  Location: [facility]     MYOMECTOMY UTERUS  2012    open myomectomy in [place], reported very large fibroids       Social History     Tobacco Use     Smoking status: Never Smoker     Smokeless tobacco: Never Used   Substance Use Topics     Alcohol use: Yes     Comment: occasionally     Family History   Problem Relation Age of Onset     Other - See Comments Mother         heavy menstrual cycles     Hashimoto's thyroiditis Mother      Lupus Father      Lupus Maternal Aunt      Leukemia Maternal Grandmother      Cancer No family hx of         Scooter's Syndrome         Current Outpatient Medications   Medication Sig Dispense Refill     Multiple Vitamins-Minerals (MULTIVITAMIN PO)        Allergies   Allergen Reactions     Clindamycin        Mammogram not appropriate for this patient based on age.    Pertinent mammograms are reviewed under the imaging tab.  History of abnormal Pap smear: NO - age 30-65 PAP every 5 years with negative HPV co-testing recommended  PAP / HPV Latest Ref Rng & Units 9/28/2018   PAP - NIL   HPV 16 DNA NEG:Negative Negative   HPV 18 DNA NEG:Negative Negative   OTHER HR HPV NEG:Negative Negative     Reviewed and updated as needed this visit by clinical staff  Tobacco  Allergies  Meds   Med Hx  Surg Hx  Fam Hx  Soc Hx        Reviewed and updated as needed this visit by Provider                Review of Systems   Constitutional: Negative for chills and fever.   HENT: Negative for congestion, ear pain, hearing loss and sore throat.    Eyes:  Negative for pain and visual disturbance.   Respiratory: Negative for cough and shortness of breath.    Cardiovascular: Negative for chest pain, palpitations and peripheral edema.   Gastrointestinal: Negative for abdominal pain, constipation, diarrhea, heartburn, hematochezia and nausea.   Breasts:  Negative for tenderness, breast mass and discharge.   Genitourinary: Negative for dysuria, frequency, genital sores, hematuria, pelvic pain, urgency, vaginal bleeding and vaginal discharge.   Musculoskeletal: Negative for arthralgias, joint swelling and myalgias.   Skin: Positive for rash.   Neurological: Negative for dizziness, weakness, headaches and paresthesias.   Psychiatric/Behavioral: Negative for mood changes. The patient is not nervous/anxious.       OBJECTIVE:   /60   Pulse 106   Temp 99  F (37.2  C) (Temporal)   Wt 61.7 kg (136 lb)   LMP 09/29/2020   SpO2 97%   BMI 26.46 kg/m    Physical Exam  GENERAL: healthy, alert and no distress  EYES: Eyes grossly normal to inspection, PERRL and conjunctivae and sclerae normal  HENT: ear canals and TM's normal, nose and mouth without ulcers or lesions  NECK: no adenopathy, no asymmetry, masses, or scars and thyroid normal to palpation  RESP: lungs clear to auscultation - no rales, rhonchi or wheezes  CV: regular rate and rhythm, normal S1 S2, no S3 or S4, no murmur, click or rub, no peripheral edema and peripheral pulses strong  ABDOMEN: soft, nontender, no hepatosplenomegaly, no masses and bowel sounds normal  SKIN: no suspicious lesions or rashes  PSYCH: mentation appears normal, affect normal/bright    Diagnostic Test Results:  Labs reviewed in Epic    ASSESSMENT/PLAN:   1. Routine general medical examination at a health care facility    2. Breast implant status  Referral to plastic surgery for further evaluation and removal.   - PLASTIC SURGERY REFERRAL    3. Family history of thyroiditis  - TSH with free T4 reflex    4. Multiple joint pain  - Lupus  "Anticoagulant Panel  - Rheumatoid factor  - CRP, inflammation    Patient has been advised of split billing requirements and indicates understanding: Yes     COUNSELING:  Reviewed preventive health counseling, as reflected in patient instructions    Estimated body mass index is 26.46 kg/m  as calculated from the following:    Height as of 4/16/20: 1.527 m (5' 0.12\").    Weight as of this encounter: 61.7 kg (136 lb).    Weight management plan: Discussed healthy diet and exercise guidelines    She reports that she has never smoked. She has never used smokeless tobacco.      Counseling Resources:  ATP IV Guidelines  Pooled Cohorts Equation Calculator  Breast Cancer Risk Calculator  BRCA-Related Cancer Risk Assessment: FHS-7 Tool  FRAX Risk Assessment  ICSI Preventive Guidelines  Dietary Guidelines for Americans, 2010  USDA's MyPlate  ASA Prophylaxis  Lung CA Screening    Margaret Aragon PA-C  [facility]  "

## 2020-10-15 ENCOUNTER — OFFICE VISIT (OUTPATIENT)
Dept: FAMILY MEDICINE | Facility: OTHER | Age: 35
End: 2020-10-15
Payer: COMMERCIAL

## 2020-10-15 VITALS
TEMPERATURE: 99 F | DIASTOLIC BLOOD PRESSURE: 60 MMHG | HEART RATE: 106 BPM | SYSTOLIC BLOOD PRESSURE: 104 MMHG | BODY MASS INDEX: 26.46 KG/M2 | OXYGEN SATURATION: 97 % | WEIGHT: 136 LBS

## 2020-10-15 DIAGNOSIS — M25.50 MULTIPLE JOINT PAIN: ICD-10-CM

## 2020-10-15 DIAGNOSIS — Z83.49 FAMILY HISTORY OF THYROIDITIS: ICD-10-CM

## 2020-10-15 DIAGNOSIS — Z98.82 BREAST IMPLANT STATUS: ICD-10-CM

## 2020-10-15 DIAGNOSIS — Z00.00 ROUTINE GENERAL MEDICAL EXAMINATION AT A HEALTH CARE FACILITY: Primary | ICD-10-CM

## 2020-10-15 LAB
CRP SERPL-MCNC: <2.9 MG/L (ref 0–8)
TSH SERPL DL<=0.005 MIU/L-ACNC: 1.21 MU/L (ref 0.4–4)

## 2020-10-15 PROCEDURE — 36415 COLL VENOUS BLD VENIPUNCTURE: CPT | Performed by: PHYSICIAN ASSISTANT

## 2020-10-15 PROCEDURE — 85613 RUSSELL VIPER VENOM DILUTED: CPT | Performed by: PHYSICIAN ASSISTANT

## 2020-10-15 PROCEDURE — 86431 RHEUMATOID FACTOR QUANT: CPT | Performed by: PHYSICIAN ASSISTANT

## 2020-10-15 PROCEDURE — 84443 ASSAY THYROID STIM HORMONE: CPT | Performed by: PHYSICIAN ASSISTANT

## 2020-10-15 PROCEDURE — 86140 C-REACTIVE PROTEIN: CPT | Performed by: PHYSICIAN ASSISTANT

## 2020-10-15 PROCEDURE — 99395 PREV VISIT EST AGE 18-39: CPT | Performed by: PHYSICIAN ASSISTANT

## 2020-10-15 PROCEDURE — 85730 THROMBOPLASTIN TIME PARTIAL: CPT | Performed by: PHYSICIAN ASSISTANT

## 2020-10-15 PROCEDURE — 99213 OFFICE O/P EST LOW 20 MIN: CPT | Mod: 25 | Performed by: PHYSICIAN ASSISTANT

## 2020-10-15 ASSESSMENT — ENCOUNTER SYMPTOMS
WEAKNESS: 0
HEMATURIA: 0
BREAST MASS: 0
NERVOUS/ANXIOUS: 0
PALPITATIONS: 0
DYSURIA: 0
ARTHRALGIAS: 0
NAUSEA: 0
HEARTBURN: 0
MYALGIAS: 0
CONSTIPATION: 0
PARESTHESIAS: 0
CHILLS: 0
HEMATOCHEZIA: 0
HEADACHES: 0
EYE PAIN: 0
SORE THROAT: 0
FREQUENCY: 0
FEVER: 0
ABDOMINAL PAIN: 0
COUGH: 0
DIZZINESS: 0
JOINT SWELLING: 0
SHORTNESS OF BREATH: 0
DIARRHEA: 0

## 2020-10-15 ASSESSMENT — PAIN SCALES - GENERAL: PAINLEVEL: NO PAIN (0)

## 2020-10-16 LAB
LA PPP-IMP: NEGATIVE
RHEUMATOID FACT SER NEPH-ACNC: 9 IU/ML (ref 0–20)

## 2020-10-23 ENCOUNTER — TELEPHONE (OUTPATIENT)
Dept: FAMILY MEDICINE | Facility: OTHER | Age: 35
End: 2020-10-23

## 2020-10-23 NOTE — TELEPHONE ENCOUNTER
Left message for patient to return call. She has an appointment for a covid test and I need to know what kind and who is the ordering provider. We also do not do testing in the parking lot.    Thank You,  Dory Soares MLT(ASCP)

## 2021-02-25 ENCOUNTER — TELEPHONE (OUTPATIENT)
Dept: SURGERY | Facility: CLINIC | Age: 36
End: 2021-02-25

## 2021-02-25 NOTE — TELEPHONE ENCOUNTER
PREVISIT INFORMATION                                                    Shayla GIFFORD Ren scheduled for future visit at [facility].    Patient is scheduled to see Dr. Lee on 2/26/21  Reason for visit: Breast implant removal  Referring provider Margaret Aragon PA-C  Has patient seen previous specialist? No  Medical Records:  Available in chart.  Patient was previously seen at a [facility] or [facility] facility.    REVIEW                                                      New patient packet mailed to patient: No  Medication reconciliation complete: No      Current Outpatient Medications   Medication Sig Dispense Refill     Multiple Vitamins-Minerals (MULTIVITAMIN PO)          Allergies: Clindamycin        PLAN/FOLLOW-UP NEEDED                                                      Previsit review complete.  Patient will see provider at future scheduled appointment.     Patient Reminders Given:  Please, make sure you bring an updated list of your medications.   If you are having a procedure, please, present 15 minutes early.  If you need to cancel or reschedule,please call [phone].    Mile Graves LPN

## 2021-02-26 ENCOUNTER — OFFICE VISIT (OUTPATIENT)
Dept: SURGERY | Facility: CLINIC | Age: 36
End: 2021-02-26
Payer: COMMERCIAL

## 2021-02-26 VITALS — WEIGHT: 140.9 LBS | BODY MASS INDEX: 27.66 KG/M2 | HEIGHT: 60 IN

## 2021-02-26 DIAGNOSIS — Z98.82 S/P BREAST AUGMENTATION: Primary | ICD-10-CM

## 2021-02-26 PROCEDURE — 99203 OFFICE O/P NEW LOW 30 MIN: CPT | Performed by: PLASTIC SURGERY

## 2021-02-26 ASSESSMENT — MIFFLIN-ST. JEOR: SCORE: 1245.62

## 2021-02-26 NOTE — PROGRESS NOTES
CONSULT NOTE       REFERRING PROVIDER:  Margaret Aragon PA-C      PRESENTING COMPLAINT:  Consultation for issues with breast augmentation.      HISTORY OF PRESENTING COMPLAINT:  Ms. Mcclure is 35 years old.  She had breast augmentation done in [place] in 2001.  She does not know much about the details of what was done to her, but it seems like it was a silicone implant placed potentially under the muscle.  Nonetheless, she went from an A cup to a C cup.  Over the last 20 years, she has gained about 25 pounds.  She has not really been happy with the presence of implants over the years, although she does like the aesthetic outcome.  She feels uneasy with them, has some discomfort and would like them removed if possible.      PAST MEDICAL HISTORY:  Nil.      PAST SURGICAL HISTORY:  Myomectomy, D&C, C section, breast augmentation.      MEDICATIONS:  Nil.      ALLERGIES:  Clindamycin.      SOCIAL HISTORY:  Does not smoke, socially drinks.  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      REVIEW OF SYSTEMS:  Denies chest pain, shortness of breath, MI, CVA, DVT and PE.      PHYSICAL EXAMINATION:  Vital signs are stable.  She is afebrile, in no obvious distress.  She is 5 feet, 140 pounds, BMI of 27.5 kg/m2.  On examination of her breasts, she has grade 0 ptoses, symmetric breasts, soft implants, grade 2 capsular contracture.  No abnormalities.      ASSESSMENT AND PLAN:  Based upon the above findings, a diagnosis of breast implant presence.  No pathology was found.  If she would like them removed, that can always be done, but she must understand that she would have deflated breasts.  About 60%-70% of her breast volume is implant.  She will think about it and let me know if she wants to proceed.  I gave her quotes for the removal.  All risks, benefits and alternatives of removal, including pain, infection, bleeding, scarring, asymmetries, seromas, hematomas, wound breakdown, wound dehiscence, loss of sensation of the nipple,  unaesthetic breasts, flat deflated breasts, ptotic breasts, DVT, PE, MI, CVA, pneumonia, renal failure and death, were explained.  I also was very clear that I could not guarantee all her nonspecific aches, pains and rashes would go away even if the implants are removed.  All questions were answered.  She was happy with the visit.  She will let me know if she wants to proceed.      Total time spent with pre-visit chart review, the visit itself and post visit paperwork was 30 minutes.       cc:   Margaret Aragon PA-C   [facility]    [address] [place] [address]

## 2021-02-26 NOTE — NURSING NOTE
Quote given in clinic.    Implant removal, bilateral $1,100  ASC $609    Total $1,709    Mile Graves LPN

## 2021-02-26 NOTE — LETTER
2/26/2021         RE: Shayla Mcclure  [address]  [place] [address]        Dear Colleague,    Thank you for referring your patient, Shayla Mcclure, to the [facility]. Please see a copy of my visit note below.    CONSULT NOTE       REFERRING PROVIDER:  Margaret Aragon PA-C      PRESENTING COMPLAINT:  Consultation for issues with breast augmentation.      HISTORY OF PRESENTING COMPLAINT:  Ms. Mcclure is 35 years old.  She had breast augmentation done in [place] in 2001.  She does not know much about the details of what was done to her, but it seems like it was a silicone implant placed potentially under the muscle.  Nonetheless, she went from an A cup to a C cup.  Over the last 20 years, she has gained about 25 pounds.  She has not really been happy with the presence of implants over the years, although she does like the aesthetic outcome.  She feels uneasy with them, has some discomfort and would like them removed if possible.      PAST MEDICAL HISTORY:  Nil.      PAST SURGICAL HISTORY:  Myomectomy, D&C, C section, breast augmentation.      MEDICATIONS:  Nil.      ALLERGIES:  Clindamycin.      SOCIAL HISTORY:  Does not smoke, socially drinks.  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      REVIEW OF SYSTEMS:  Denies chest pain, shortness of breath, MI, CVA, DVT and PE.      PHYSICAL EXAMINATION:  Vital signs are stable.  She is afebrile, in no obvious distress.  She is 5 feet, 140 pounds, BMI of 27.5 kg/m2.  On examination of her breasts, she has grade 0 ptoses, symmetric breasts, soft implants, grade 2 capsular contracture.  No abnormalities.      ASSESSMENT AND PLAN:  Based upon the above findings, a diagnosis of breast implant presence.  No pathology was found.  If she would like them removed, that can always be done, but she must understand that she would have deflated breasts.  About 60%-70% of her breast volume is implant.  She will think about it and let me know if she wants to  proceed.  I gave her quotes for the removal.  All risks, benefits and alternatives of removal, including pain, infection, bleeding, scarring, asymmetries, seromas, hematomas, wound breakdown, wound dehiscence, loss of sensation of the nipple, unaesthetic breasts, flat deflated breasts, ptotic breasts, DVT, PE, MI, CVA, pneumonia, renal failure and death, were explained.  I also was very clear that I could not guarantee all her nonspecific aches, pains and rashes would go away even if the implants are removed.  All questions were answered.  She was happy with the visit.  She will let me know if she wants to proceed.      Total time spent with pre-visit chart review, the visit itself and post visit paperwork was 30 minutes.       cc:   Margaret Aragon PA-C   [facility]    [address] [place] [address]           Again, thank you for allowing me to participate in the care of your patient.        Sincerely,        LEANNE Lee MD

## 2021-02-26 NOTE — NURSING NOTE
Shayla Mcclure's goals for this visit include:   Chief Complaint   Patient presents with     Consult     breast implant removal       She requests these members of her care team be copied on today's visit information: no    PCP: Margaret Aragon    Referring Provider:  No referring provider defined for this encounter.    Ht 1.524 m (5')   Wt 63.9 kg (140 lb 14.4 oz)   BMI 27.52 kg/m      Do you need any medication refills at today's visit? No    Mile Graves LPN

## 2021-03-24 ENCOUNTER — MYC MEDICAL ADVICE (OUTPATIENT)
Dept: FAMILY MEDICINE | Facility: CLINIC | Age: 36
End: 2021-03-24

## 2021-03-24 NOTE — TELEPHONE ENCOUNTER
My Chart sent to patient with information per [facility] workbench on home care instructions for flu and virus home treatments.    Sondra Deshpande RN

## 2021-07-23 ENCOUNTER — MYC MEDICAL ADVICE (OUTPATIENT)
Dept: FAMILY MEDICINE | Facility: CLINIC | Age: 36
End: 2021-07-23

## 2021-07-23 DIAGNOSIS — L98.9 SKIN LESION: Primary | ICD-10-CM

## 2021-08-19 ENCOUNTER — MYC MEDICAL ADVICE (OUTPATIENT)
Dept: OBGYN | Facility: CLINIC | Age: 36
End: 2021-08-19

## 2021-08-19 DIAGNOSIS — Z34.90 EARLY STAGE OF PREGNANCY: Primary | ICD-10-CM

## 2021-08-19 NOTE — TELEPHONE ENCOUNTER
Pt last seen for VV on 4/29/2020 for post op of suction D&C.    LMP 7/13/2021, hx of regular cycles. Pt denies vaginal bleeding concerns.    Pt had positive HPT on 8/14, 8/15, and 8/19.    Pt asking about HCGs if needed and can repeat prior to her upcoming appt.    RN routing to provider for advisement.    Nahed Ness RN on 8/19/2021 at 12:32 PM

## 2021-08-20 NOTE — TELEPHONE ENCOUNTER
Haydee Mcpherson, DO  [facility] Triage 14 hours ago (5:40 PM)   KK  Yes, lets trend HCG levels and plan for a viability ultrasound in 2 weeks. Both are ordered. Patient should obtain HCG levels 48-72 hours apart (x2). Next appointment can be changed to a new OB.     Thank you,   Haydee Mcpherson, DO    Message text      Nahed Ness RN on 8/20/2021 at 8:35 AM

## 2021-08-23 ENCOUNTER — ANCILLARY PROCEDURE (OUTPATIENT)
Dept: ULTRASOUND IMAGING | Facility: CLINIC | Age: 36
End: 2021-08-23
Attending: OBSTETRICS & GYNECOLOGY
Payer: COMMERCIAL

## 2021-08-23 ENCOUNTER — PRENATAL OFFICE VISIT (OUTPATIENT)
Dept: OBGYN | Facility: CLINIC | Age: 36
End: 2021-08-23
Payer: COMMERCIAL

## 2021-08-23 VITALS
HEART RATE: 79 BPM | HEIGHT: 61 IN | BODY MASS INDEX: 23.7 KG/M2 | WEIGHT: 125.5 LBS | SYSTOLIC BLOOD PRESSURE: 117 MMHG | DIASTOLIC BLOOD PRESSURE: 82 MMHG

## 2021-08-23 DIAGNOSIS — O09.91 HIGH-RISK PREGNANCY IN FIRST TRIMESTER: ICD-10-CM

## 2021-08-23 DIAGNOSIS — O09.91 HIGH-RISK PREGNANCY IN FIRST TRIMESTER: Primary | ICD-10-CM

## 2021-08-23 DIAGNOSIS — N89.8 VAGINAL DISCHARGE: ICD-10-CM

## 2021-08-23 DIAGNOSIS — Z34.90 EARLY STAGE OF PREGNANCY: ICD-10-CM

## 2021-08-23 LAB
ABO/RH(D): NORMAL
ANTIBODY SCREEN: NEGATIVE
B-HCG SERPL-ACNC: ABNORMAL IU/L (ref 0–5)
CLUE CELLS: PRESENT
ERYTHROCYTE [DISTWIDTH] IN BLOOD BY AUTOMATED COUNT: 12.5 % (ref 10–15)
HCT VFR BLD AUTO: 40.2 % (ref 35–47)
HGB BLD-MCNC: 13.8 G/DL (ref 11.7–15.7)
MCH RBC QN AUTO: 29.8 PG (ref 26.5–33)
MCHC RBC AUTO-ENTMCNC: 34.3 G/DL (ref 31.5–36.5)
MCV RBC AUTO: 87 FL (ref 78–100)
PLAT MORPH BLD: NORMAL
PLATELET # BLD AUTO: 380 10E3/UL (ref 150–450)
RBC # BLD AUTO: 4.63 10E6/UL (ref 3.8–5.2)
RBC MORPH BLD: NORMAL
SPECIMEN EXPIRATION DATE: NORMAL
SPECIMEN EXPIRATION DATE: NORMAL
TRICHOMONAS, WET PREP: ABNORMAL
WBC # BLD AUTO: 8.7 10E3/UL (ref 4–11)
WBC'S/HIGH POWER FIELD, WET PREP: ABNORMAL
YEAST, WET PREP: ABNORMAL

## 2021-08-23 PROCEDURE — 76801 OB US < 14 WKS SINGLE FETUS: CPT | Performed by: RADIOLOGY

## 2021-08-23 PROCEDURE — 87591 N.GONORRHOEAE DNA AMP PROB: CPT | Performed by: OBSTETRICS & GYNECOLOGY

## 2021-08-23 PROCEDURE — 87389 HIV-1 AG W/HIV-1&-2 AB AG IA: CPT | Performed by: OBSTETRICS & GYNECOLOGY

## 2021-08-23 PROCEDURE — 87491 CHLMYD TRACH DNA AMP PROBE: CPT | Performed by: OBSTETRICS & GYNECOLOGY

## 2021-08-23 PROCEDURE — 86901 BLOOD TYPING SEROLOGIC RH(D): CPT | Performed by: OBSTETRICS & GYNECOLOGY

## 2021-08-23 PROCEDURE — 86762 RUBELLA ANTIBODY: CPT | Performed by: OBSTETRICS & GYNECOLOGY

## 2021-08-23 PROCEDURE — 86850 RBC ANTIBODY SCREEN: CPT | Performed by: OBSTETRICS & GYNECOLOGY

## 2021-08-23 PROCEDURE — 76817 TRANSVAGINAL US OBSTETRIC: CPT | Performed by: RADIOLOGY

## 2021-08-23 PROCEDURE — 85027 COMPLETE CBC AUTOMATED: CPT | Performed by: OBSTETRICS & GYNECOLOGY

## 2021-08-23 PROCEDURE — 86803 HEPATITIS C AB TEST: CPT | Performed by: OBSTETRICS & GYNECOLOGY

## 2021-08-23 PROCEDURE — 86780 TREPONEMA PALLIDUM: CPT | Performed by: OBSTETRICS & GYNECOLOGY

## 2021-08-23 PROCEDURE — 86900 BLOOD TYPING SEROLOGIC ABO: CPT | Performed by: OBSTETRICS & GYNECOLOGY

## 2021-08-23 PROCEDURE — 87086 URINE CULTURE/COLONY COUNT: CPT | Performed by: OBSTETRICS & GYNECOLOGY

## 2021-08-23 PROCEDURE — 87340 HEPATITIS B SURFACE AG IA: CPT | Performed by: OBSTETRICS & GYNECOLOGY

## 2021-08-23 PROCEDURE — 36415 COLL VENOUS BLD VENIPUNCTURE: CPT | Performed by: OBSTETRICS & GYNECOLOGY

## 2021-08-23 PROCEDURE — 87210 SMEAR WET MOUNT SALINE/INK: CPT | Performed by: OBSTETRICS & GYNECOLOGY

## 2021-08-23 PROCEDURE — 84702 CHORIONIC GONADOTROPIN TEST: CPT | Performed by: OBSTETRICS & GYNECOLOGY

## 2021-08-23 ASSESSMENT — MIFFLIN-ST. JEOR: SCORE: 1180.76

## 2021-08-23 NOTE — PROGRESS NOTES
New OB Visit    Shayla Mcclure is a 36 year old  at 5w6d by LMP who presents today for a new OB exam. This is unplanned but a desired pregnancy. Since her LMP, has experienced  nausea, emesis, fatigue and breast tenderness. The patient denies vaginal bleeding, cramping or abdominal pain, or vaginal discharge. She denies abdominal pain, dysuria and vaginal bleeding.    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 Possible classical uterine incision. Would like to get records.    Have you travelled during the pregnancy?No  Have your sexual partner(s) travelled during the pregnancy?No    Has had the COVID vaccine (Moderna) in March    Ob Hx:  s/p  delivered by  at 27w3d for pre-eclampsia with severe features; SAB .    Past Medical History of Father of Baby: No significant medical history    Gyn Hx: Patient's last menstrual period was 2021.     Last pap was 2018 nil, neg hpv, no history of abnormal paps.    STI history denies      Family history genetic disorders, cystic fibrosis, SMA, intellectual disability or autism- denies    Most Recent Immunizations   Administered Date(s) Administered     COVID-19,PF,Moderna 2021     Influenza Vaccine IM > 6 months Valent IIV4 2020     Influenza Vaccine, 6+MO IM (QUADRIVALENT W/PRESERVATIVES) 2019        PMH:   Past Medical History:   Diagnosis Date     Carrier Scooter syndrome (H)     affected child     History of severe pre-eclampsia     delivered at 27w3d     Leiomyoma     prior laparotomy for two large fibroids        SurgHx:   Past Surgical History:   Procedure Laterality Date     C/SECTION, LOW TRANSVERSE      delivered at 27w3d for pre-eclampsia with severe features      COSMETIC MAMMOPLASTY AUGMENTATION BILATERAL      when 16 years old     DILATION AND  CURETTAGE SUCTION N/A 4/16/2020    Procedure: DILATION AND CURETTAGE, UTERUS, USING SUCTION;  Surgeon: Haydee Mcpherson DO;  Location: [facility]     MYOMECTOMY UTERUS  2012    open myomectomy in [place], reported very large fibroids       FamHx:   Family History   Problem Relation Age of Onset     Other - See Comments Mother         heavy menstrual cycles     Hashimoto's thyroiditis Mother      Lupus Father      Lupus Maternal Aunt      Leukemia Maternal Grandmother      Cancer No family hx of         Scooter's Syndrome       SocHx:   Social History     Socioeconomic History     Marital status:      Spouse name: Not on file     Number of children: Not on file     Years of education: Not on file     Highest education level: Not on file   Occupational History     Occupation: homemaker   Tobacco Use     Smoking status: Never Smoker     Smokeless tobacco: Never Used   Substance and Sexual Activity     Alcohol use: Yes     Comment: occasionally     Drug use: No     Sexual activity: Yes     Partners: Male     Birth control/protection: None   Other Topics Concern     Parent/sibling w/ CABG, MI or angioplasty before 65F 55M? Not Asked   Social History Narrative    Patient and her  moved to [place] (from [place] via [place]) for their daughter with Scooter's Syndrome.     Very happy in [place].     Silvia Leyva     Social Determinants of Health     Financial Resource Strain:      Difficulty of Paying Living Expenses:    Food Insecurity:      Worried About Running Out of Food in the Last Year:      Ran Out of Food in the Last Year:    Transportation Needs:      Lack of Transportation (Medical):      Lack of Transportation (Non-Medical):    Physical Activity:      Days of Exercise per Week:      Minutes of Exercise per Session:    Stress:      Feeling of Stress :    Social Connections:      Frequency of Communication with Friends and Family:      Frequency of Social Gatherings with Friends and Family:      Attends  "Faith Services:      Active Member of Clubs or Organizations:      Attends Club or Organization Meetings:      Marital Status:    Intimate Partner Violence:      Fear of Current or Ex-Partner:      Emotionally Abused:      Physically Abused:      Sexually Abused:        Allergies:   Clindamycin    Medications:   Multiple Vitamins-Minerals (MULTIVITAMIN PO),   Prenatal Vit-Fe Fumarate-FA (PRENATAL MULTIVITAMIN W/IRON) 27-0.8 MG tablet, Take 1 tablet by mouth daily    No current facility-administered medications on file prior to visit.      Past medical, surgical, social and family history were reviewed and updated in Epic.      ROS: As described in HPI, otherwise negative for fever/chills, fatigue, dizziness, changes or new deficits in vision, worrisome rashes, new lumps or masses, cough/SOB/CP, GI distress, dysuria, abnormal vaginal discharge, constipation/diarrhea, neurological deficits, or other systemic complaints    EXAM:  Vitals: /82   Pulse 79   Ht 1.54 m (5' 0.63\")   Wt 56.9 kg (125 lb 8 oz)   LMP 07/13/2021   BMI 24.00 kg/m    BMI= Body mass index is 24 kg/m .      Gen: Alert, oriented, appropriately interactive, NAD  Neck: soft, no cervical adenopathy, no masses  CV: RRR, no murmurs, no extra heart sounds, 2+ peripheral pulses  Resp: CTAB, good effort without distress   Breasts: no axillary adenopathy, no dominant masses, no skin changes, no nipple discharge, nontender  Abdomen: soft, gravid, non tender, non distended, no masses, no hernias. No inguinal lymphadenopathy  External genitalia: no lesions; normal appearing external genitalia, bartholins glands, urethra, skenes glands  Vagina: no masses or lesions or discharge, normally rugated.  Cervix: no masses or lesions or discharge   Bimanual exam:   Urethra nontender   Bladder: nontender and without massess, well supported   Uterus: midline , anteverted, mobile , no masses, non-tender, gravid at 6 weeks  Adnexa: no masses or tenderness   No " cervical motion tenderness  Lower extremities: non-tender, no edema  Skin: no lesions or rashes      Labs & Imaging:  Recent Labs   Lab Test 20  0419 20  0240   ABO Not A   RH Done Neg   AS  --  Neg         ASSESSMENT/PLAN: Shayla Mcclure is a 36 year old  at 5w6d by LMP who presents today for her first ob visit      ICD-10-CM    1. High-risk pregnancy in first trimester  O09.91 US OB <14 Weeks w Transvaginal Single     ABO and Rh     Antibody Screen - Red Cell     CBC with platelets     Rubella Antibody IgG     Treponema Abs w Reflex to RPR and Titer     Hepatitis B surface antigen     Hepatitis C antibody     HIV Antigen Antibody Combo     Chlamydia trachomatis PCR     Neisseria gonorrhoeae PCR     Urine Culture     Urine Culture     Neisseria gonorrhoeae PCR     Chlamydia trachomatis PCR     ABO and Rh     Antibody Screen - Red Cell     CBC with platelets     Rubella Antibody IgG     Treponema Abs w Reflex to RPR and Titer     Hepatitis B surface antigen     Hepatitis C antibody     HIV Antigen Antibody Combo     RBC and Platelet Morphology   2. Vaginal discharge  N89.8 Wet prep - Clinic Collect   3. Early stage of pregnancy  Z34.90 HCG Quantitative Pregnancy, Blood ([id number])       Pregnancy c/b:  -AMA. Plan ASA at 12wks. Level II ultrasound. Growth in third trimester.  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 Possible classical uterine incision, MARK signed today.    Routine Prenatal Care:  -Discussed  ordered labs and ultrasound,   all questions answered.   -Viability ultrasound ordered   -Discussed benefits of breastfeeding  -Folder  given, outlining p hysician coverage, exercise, weight gain, schedule of visits, routine and indicated ultrasounds, prenatal vitamins and childbirth education.  Desires delivery at [facility]  -Body mass index is 24 kg/m . -  recommend 25-35 lbs (BMI 18.5-24.9)    Return in 4 weeks for routine OB care, sooner if needed    45 minutes spent on the date of the encounter doing chart review, history and exam, documentation and further activities as noted above     Haydee Mcpherson DO  8/23/2021 9:51 AM

## 2021-08-24 DIAGNOSIS — O09.91 HIGH-RISK PREGNANCY IN FIRST TRIMESTER: Primary | ICD-10-CM

## 2021-08-24 LAB
BACTERIA UR CULT: NO GROWTH
C TRACH DNA SPEC QL NAA+PROBE: NEGATIVE
HBV SURFACE AG SERPL QL IA: NONREACTIVE
HCV AB SERPL QL IA: NONREACTIVE
HIV 1+2 AB+HIV1 P24 AG SERPL QL IA: NONREACTIVE
N GONORRHOEA DNA SPEC QL NAA+PROBE: NEGATIVE
RUBV IGG SERPL QL IA: 3.25 INDEX
RUBV IGG SERPL QL IA: POSITIVE
T PALLIDUM AB SER QL: NONREACTIVE

## 2021-08-25 ENCOUNTER — LAB (OUTPATIENT)
Dept: LAB | Facility: CLINIC | Age: 36
End: 2021-08-25
Payer: COMMERCIAL

## 2021-08-25 DIAGNOSIS — Z34.90 EARLY STAGE OF PREGNANCY: ICD-10-CM

## 2021-08-25 LAB — B-HCG SERPL-ACNC: ABNORMAL IU/L (ref 0–5)

## 2021-08-25 PROCEDURE — 36415 COLL VENOUS BLD VENIPUNCTURE: CPT

## 2021-08-25 PROCEDURE — 84702 CHORIONIC GONADOTROPIN TEST: CPT

## 2021-09-01 ENCOUNTER — MYC MEDICAL ADVICE (OUTPATIENT)
Dept: OBGYN | Facility: CLINIC | Age: 36
End: 2021-09-01

## 2021-09-07 DIAGNOSIS — O26.859 SPOTTING IN PREGNANCY: Primary | ICD-10-CM

## 2021-09-08 ENCOUNTER — TELEPHONE (OUTPATIENT)
Dept: OBGYN | Facility: CLINIC | Age: 36
End: 2021-09-08

## 2021-09-08 ENCOUNTER — MYC MEDICAL ADVICE (OUTPATIENT)
Dept: FAMILY MEDICINE | Facility: CLINIC | Age: 36
End: 2021-09-08

## 2021-09-08 ENCOUNTER — ANCILLARY PROCEDURE (OUTPATIENT)
Dept: ULTRASOUND IMAGING | Facility: CLINIC | Age: 36
End: 2021-09-08
Attending: OBSTETRICS & GYNECOLOGY
Payer: COMMERCIAL

## 2021-09-08 ENCOUNTER — LAB (OUTPATIENT)
Dept: LAB | Facility: CLINIC | Age: 36
End: 2021-09-08
Payer: COMMERCIAL

## 2021-09-08 DIAGNOSIS — Z34.90 EARLY STAGE OF PREGNANCY: ICD-10-CM

## 2021-09-08 DIAGNOSIS — B37.31 YEAST INFECTION OF THE VAGINA: Primary | ICD-10-CM

## 2021-09-08 DIAGNOSIS — O26.859 SPOTTING IN PREGNANCY: ICD-10-CM

## 2021-09-08 LAB
ALBUMIN UR-MCNC: NEGATIVE MG/DL
APPEARANCE UR: CLEAR
BACTERIA #/AREA URNS HPF: ABNORMAL /HPF
BILIRUB UR QL STRIP: NEGATIVE
CLUE CELLS: ABNORMAL
COLOR UR AUTO: YELLOW
GLUCOSE UR STRIP-MCNC: NEGATIVE MG/DL
HGB UR QL STRIP: NEGATIVE
KETONES UR STRIP-MCNC: NEGATIVE MG/DL
LEUKOCYTE ESTERASE UR QL STRIP: NEGATIVE
MUCOUS THREADS #/AREA URNS LPF: PRESENT /LPF
NITRATE UR QL: NEGATIVE
PH UR STRIP: 5.5 [PH] (ref 5–7)
RBC #/AREA URNS AUTO: ABNORMAL /HPF
SP GR UR STRIP: 1.02 (ref 1–1.03)
SQUAMOUS #/AREA URNS AUTO: ABNORMAL /LPF
TRICHOMONAS, WET PREP: ABNORMAL
UROBILINOGEN UR STRIP-MCNC: NORMAL MG/DL
WBC #/AREA URNS AUTO: ABNORMAL /HPF
WBC'S/HIGH POWER FIELD, WET PREP: ABNORMAL
YEAST, WET PREP: PRESENT

## 2021-09-08 PROCEDURE — 76801 OB US < 14 WKS SINGLE FETUS: CPT | Performed by: RADIOLOGY

## 2021-09-08 PROCEDURE — 76817 TRANSVAGINAL US OBSTETRIC: CPT | Performed by: RADIOLOGY

## 2021-09-08 PROCEDURE — 87210 SMEAR WET MOUNT SALINE/INK: CPT

## 2021-09-08 PROCEDURE — 81001 URINALYSIS AUTO W/SCOPE: CPT

## 2021-09-08 RX ORDER — CLOTRIMAZOLE 1 %
1 CREAM WITH APPLICATOR VAGINAL AT BEDTIME
Qty: 45 G | Refills: 0 | Status: SHIPPED | OUTPATIENT
Start: 2021-09-08 | End: 2021-09-15

## 2021-09-08 NOTE — TELEPHONE ENCOUNTER
Received a phone call from radiologist Dr. Pineda who states pt just had an ultrasound that shows a pregnancy failure/loss.  Dr. Pineda asked if she should send pt to our department for counseling.  RN spoke with Nora vu provider today w/o openings on schedule, states would make herself available to speak w/pt if pt is upset, however would be okay for Dr. Pineda to release information/results to pt and have her follow up with Dr. Mcpherson.    Dr. Pineda agreed with plan and will inform pt of ultrasound findings and should follow up with Dr. Mcpherson.     Routing to provider for awareness.    Anita Gallego, HEMANTN RN

## 2021-09-09 ENCOUNTER — TELEPHONE (OUTPATIENT)
Dept: OBGYN | Facility: CLINIC | Age: 36
End: 2021-09-09

## 2021-09-09 ENCOUNTER — VIRTUAL VISIT (OUTPATIENT)
Dept: OBGYN | Facility: CLINIC | Age: 36
End: 2021-09-09
Payer: COMMERCIAL

## 2021-09-09 ENCOUNTER — PREP FOR PROCEDURE (OUTPATIENT)
Dept: OBGYN | Facility: CLINIC | Age: 36
End: 2021-09-09

## 2021-09-09 DIAGNOSIS — O02.1 MISSED ABORTION: Primary | ICD-10-CM

## 2021-09-09 DIAGNOSIS — Z11.59 ENCOUNTER FOR SCREENING FOR OTHER VIRAL DISEASES: ICD-10-CM

## 2021-09-09 PROCEDURE — 99213 OFFICE O/P EST LOW 20 MIN: CPT | Mod: TEL | Performed by: OBSTETRICS & GYNECOLOGY

## 2021-09-09 NOTE — PROGRESS NOTES
Shayla GIFFORD Ren is a 36 year old female who is being evaluated via a billable telephone visit.      What phone number would you like to be contacted at? [phone]  How would you like to obtain your AVS? Tayler

## 2021-09-09 NOTE — TELEPHONE ENCOUNTER
Haydee Mcpherson, DO  Mg Ob/Gyn Triage 1 hour ago (7:02 AM)   KK  Please reach out to patient to place on my schedule today for VV to review findings further and options going forward. Can be placed on schedule as early as 1130.     Thank you,   Haydee Mcpherson,     Message text      Unable to reach patient via phone. RN left a message and instructed patient to call the clinic at [phone].    RN also sent Anne Fogartyt message.    Nahed Ness RN on 9/9/2021 at 8:37 AM

## 2021-09-09 NOTE — TELEPHONE ENCOUNTER
Pt responded to [employer] message, RN awaiting response again via [email or web address] encounter from today. RN closing this encounter.    Nahed Ness RN on 9/9/2021 at 8:58 AM

## 2021-09-09 NOTE — TELEPHONE ENCOUNTER
Haydee Mcpherson DO  P Mg Obgyn Surg Sched Pool  Patient Name: Shayla Mcclure   MRN: [id number]   Case#: [id number]   Surgeon(s) and Role:      * Haydee Mcpherson DO - Primary   Date requested: * No dates entered *   Location:  OR   Procedure(s):   DILATION AND CURETTAGE, UTERUS, USING SUCTION (N/A)       Additional Instructions for the Case  Surgical Assistant: No  Multi Surgeon Case No  H&P:  Pre-op options: PCP  Post-op:  2 weeks  Vendor: No  Surgical time needed: 30 Minutes  ERAS patient: Yes    Patient prefers Monday or Tuesday if possible    SURGERY SCHEDULING AND PRECERTIFICATION    Medical Record Number: [id number]  Shayla Mcclure  YOB: 1985   Phone: [phone] (home)   Primary Provider: Margaret Aragon    Reason for Admit:    ICD-10 CODE:  O02.1    Surgeon: Haydee Mcpherson DO  Surgical Procedure: DILATION AND CURETTAGE, UTERUS, USING SUCTION (N/A)     Date of Surgery 9/20/2021 Time of Surgery approx. 2pm  Surgery to be performed at:  [facility]   Status: Outpatient  Type of Anesthesia Anticipated: MAC    Sterilization consent:  Not applicable to procedure being performed.    Pre-Op: On 9/17/2021 with Dr. Isabel at 2pm   Post-Op:  2 weeks on 10/4/2021 with Dr. Mcpherson at 10am  COVID testing:  ASC:  Covid test is scheduled for 9/17/2021 at 3pm.    Pre-certification routed to Financial Counselors:  Auto routes via Case Request    Surgery packet mailed to patient's home address: Yes  Patient instructed NPO 12 hours prior to surgery, arrive 1 hour(s) prior to surgery, must have a .  Patient understood and agrees to the plan.      Requestor:  Genevieve Carey MA     Location:  [place] [phone]

## 2021-09-09 NOTE — PROGRESS NOTES
SAB Visit    Shayla Mcclure is a 36 year old  at 8w2d by LMP who presents today for VV to discuss miscarriage.  Viability ultrasound yesterday consistent with fetal demise.  No vaginal bleeding, cramping. No f/c.    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 Classical uterine incision.    Have you travelled during the pregnancy?No  Have your sexual partner(s) travelled during the pregnancy?No    Has had the COVID vaccine (Moderna) in March    Ob Hx:  s/p  delivered by  at 27w3d for pre-eclampsia with severe features; SAB .    Past Medical History of Father of Baby: No significant medical history    Gyn Hx: Patient's last menstrual period was 2021.     Last pap was 2018 nil, neg hpv, no history of abnormal paps.    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     History of severe pre-eclampsia     delivered at 27w3d     Leiomyoma     prior laparotomy for two large fibroids        SurgHx:   Past Surgical History:   Procedure Laterality Date     C/SECTION, LOW TRANSVERSE      delivered at 27w3d for pre-eclampsia with severe features      COSMETIC MAMMOPLASTY AUGMENTATION BILATERAL      when 16 years old     DILATION AND CURETTAGE SUCTION N/A 2020    Procedure: DILATION AND CURETTAGE, UTERUS, USING SUCTION;  Surgeon: Haydee Mcpherson DO;  Location: [facility]     MYOMECTOMY UTERUS       open myomectomy in [place], reported very large fibroids       FamHx:   Family History   Problem Relation Age of Onset     Other - See Comments Mother         heavy menstrual cycles     Hashimoto's thyroiditis Mother      Lupus Father      Lupus Maternal Aunt      Leukemia Maternal Grandmother      Cancer No family hx of         Scooter's Syndrome       SocHx:   Social History     Socioeconomic History     Marital status:      Spouse name: Not on file     Number of children: Not on file     Years of education: Not on file     Highest education level: Not on file   Occupational History     Occupation: homemaker   Tobacco Use     Smoking status: Never Smoker     Smokeless tobacco: Never Used   Substance and Sexual Activity     Alcohol use: Yes     Comment: occasionally     Drug use: No     Sexual activity: Yes     Partners: Male     Birth control/protection: None   Other Topics Concern     Parent/sibling w/ CABG, MI or angioplasty before 65F 55M? Not Asked   Social History Narrative    Patient and her  moved to [place] (from [place] via [place]) for their daughter with Scooter's Syndrome.     Very happy in [place].     Silvia Leyva     Social Determinants of Health     Financial Resource Strain:      Difficulty of Paying Living Expenses:    Food Insecurity:      Worried About Running Out of Food in the Last Year:      Ran Out of Food in the Last Year:    Transportation Needs:      Lack of Transportation (Medical):      Lack of Transportation (Non-Medical):    Physical Activity:      Days of Exercise per Week:      Minutes of Exercise per Session:    Stress:      Feeling of Stress :    Social Connections:      Frequency of Communication with Friends and Family:      Frequency of Social Gatherings with Friends and Family:      Attends Zoroastrian Services:      Active Member of Clubs or Organizations:      Attends Club or Organization Meetings:      Marital Status:    Intimate Partner Violence:      Fear of  Current or Ex-Partner:      Emotionally Abused:      Physically Abused:      Sexually Abused:        Allergies:   Clindamycin    Medications:   clotrimazole (LOTRIMIN) 1 % vaginal cream, Place 1 Applicatorful vaginally At Bedtime for 7 days  Prenatal Vit-Fe Fumarate-FA (PRENATAL MULTIVITAMIN W/IRON) 27-0.8 MG tablet, Take 1 tablet by mouth daily  Multiple Vitamins-Minerals (MULTIVITAMIN PO),     No current facility-administered medications on file prior to visit.      Past medical, surgical, social and family history were reviewed and updated in Epic.      ROS: As described in HPI, otherwise negative for fever/chills, fatigue, dizziness, changes or new deficits in vision, worrisome rashes, new lumps or masses, cough/SOB/CP, GI distress, dysuria, abnormal vaginal discharge, constipation/diarrhea, neurological deficits, or other systemic complaints    EXAM:  Vitals: LMP 2021   BMI= There is no height or weight on file to calculate BMI.    Otherwise unable to perform vital signs and physical exam due to nature of phone visit amid COVID-19 precautions.       GENERAL: healthy, alert and no distress  RESP: no audible wheeze, cough.  Able to speak fully in complete sentences.  NEURO: Cranial nerves grossly intact, mentation intact and speech normal  PSYCH: mentation appears normal, affect normal/bright, judgement and insight intact, normal speech       Labs & Imaging:  Blood type:  A negative    ASSESSMENT/PLAN: Shayla Mcclure is a 36 year old  at 8w2d  by LMP who presents today for vv for miscarriage      ICD-10-CM    1. Missed   O02.1        Reviewed that miscarriage occurs ~ 1 in 5 pregnancy events and that there was no direct event or prevention  that the patient could have avoided or performed.  Discussed that there are many etiologies for miscarriage, the most common being a genetic anomaly.  Reviewed that risk of miscarriage for next pregnancy is not elevated by the current event. However, she  has a history of one prior miscarriage, and would consider preconception counseling given medical history, age. Could consider work-up for pregnancy loss after management of current MAB.    Reviewed options of expectant management, D&C, and medical therapy (cytotec).  Reviewed risks and benefits of all options.  All questions answered.  Patient is opting for suction D+C in the OR. She is requesting to continue with scheduled ultrasound later this week for peace of mind, confirmation of fetal demise. Will plan for D+C sometime next week if possible, case request placed. All questions answered.    Haydee Mcpherson DO  8/23/2021 9:51 AM  Phone call duration: 10 minutes

## 2021-09-14 ENCOUNTER — HOSPITAL ENCOUNTER (OUTPATIENT)
Dept: ULTRASOUND IMAGING | Facility: CLINIC | Age: 36
Discharge: HOME | End: 2021-09-14
Attending: OBSTETRICS & GYNECOLOGY | Admitting: OBSTETRICS & GYNECOLOGY
Payer: COMMERCIAL

## 2021-09-14 DIAGNOSIS — O09.91 HIGH-RISK PREGNANCY IN FIRST TRIMESTER: ICD-10-CM

## 2021-09-14 PROCEDURE — 76801 OB US < 14 WKS SINGLE FETUS: CPT | Mod: 26 | Performed by: RADIOLOGY

## 2021-09-14 PROCEDURE — 76801 OB US < 14 WKS SINGLE FETUS: CPT

## 2021-09-14 PROCEDURE — 76817 TRANSVAGINAL US OBSTETRIC: CPT | Mod: 26 | Performed by: RADIOLOGY

## 2021-09-17 ENCOUNTER — OFFICE VISIT (OUTPATIENT)
Dept: FAMILY MEDICINE | Facility: OTHER | Age: 36
End: 2021-09-17
Payer: COMMERCIAL

## 2021-09-17 ENCOUNTER — MYC MEDICAL ADVICE (OUTPATIENT)
Dept: FAMILY MEDICINE | Facility: CLINIC | Age: 36
End: 2021-09-17

## 2021-09-17 ENCOUNTER — ANESTHESIA EVENT (OUTPATIENT)
Dept: SURGERY | Facility: AMBULATORY SURGERY CENTER | Age: 36
End: 2021-09-17
Payer: COMMERCIAL

## 2021-09-17 VITALS
TEMPERATURE: 98.1 F | SYSTOLIC BLOOD PRESSURE: 102 MMHG | OXYGEN SATURATION: 98 % | HEART RATE: 103 BPM | BODY MASS INDEX: 24.94 KG/M2 | DIASTOLIC BLOOD PRESSURE: 68 MMHG | HEIGHT: 60 IN | RESPIRATION RATE: 12 BRPM | WEIGHT: 127 LBS

## 2021-09-17 DIAGNOSIS — Z11.59 ENCOUNTER FOR SCREENING FOR OTHER VIRAL DISEASES: ICD-10-CM

## 2021-09-17 DIAGNOSIS — O03.9 MISCARRIAGE: ICD-10-CM

## 2021-09-17 DIAGNOSIS — Z01.818 PREOP GENERAL PHYSICAL EXAM: Primary | ICD-10-CM

## 2021-09-17 PROCEDURE — 99213 OFFICE O/P EST LOW 20 MIN: CPT | Performed by: FAMILY MEDICINE

## 2021-09-17 PROCEDURE — U0005 INFEC AGEN DETEC AMPLI PROBE: HCPCS | Performed by: FAMILY MEDICINE

## 2021-09-17 PROCEDURE — U0003 INFECTIOUS AGENT DETECTION BY NUCLEIC ACID (DNA OR RNA); SEVERE ACUTE RESPIRATORY SYNDROME CORONAVIRUS 2 (SARS-COV-2) (CORONAVIRUS DISEASE [COVID-19]), AMPLIFIED PROBE TECHNIQUE, MAKING USE OF HIGH THROUGHPUT TECHNOLOGIES AS DESCRIBED BY CMS-2020-01-R: HCPCS | Performed by: FAMILY MEDICINE

## 2021-09-17 ASSESSMENT — MIFFLIN-ST. JEOR: SCORE: 1180.06

## 2021-09-17 ASSESSMENT — PAIN SCALES - GENERAL: PAINLEVEL: NO PAIN (0)

## 2021-09-18 LAB — SARS-COV-2 RNA RESP QL NAA+PROBE: NEGATIVE

## 2021-09-20 ENCOUNTER — HOSPITAL ENCOUNTER (OUTPATIENT)
Facility: AMBULATORY SURGERY CENTER | Age: 36
End: 2021-09-20
Attending: OBSTETRICS & GYNECOLOGY
Payer: COMMERCIAL

## 2021-09-20 ENCOUNTER — ANESTHESIA (OUTPATIENT)
Dept: SURGERY | Facility: AMBULATORY SURGERY CENTER | Age: 36
End: 2021-09-20
Payer: COMMERCIAL

## 2021-09-20 ENCOUNTER — TELEPHONE (OUTPATIENT)
Dept: OBGYN | Facility: CLINIC | Age: 36
End: 2021-09-20

## 2021-09-20 VITALS
OXYGEN SATURATION: 98 % | SYSTOLIC BLOOD PRESSURE: 114 MMHG | RESPIRATION RATE: 16 BRPM | TEMPERATURE: 98.1 F | DIASTOLIC BLOOD PRESSURE: 74 MMHG

## 2021-09-20 DIAGNOSIS — O02.1 MISSED ABORTION: ICD-10-CM

## 2021-09-20 PROCEDURE — 59820 CARE OF MISCARRIAGE: CPT | Performed by: OBSTETRICS & GYNECOLOGY

## 2021-09-20 PROCEDURE — 88305 TISSUE EXAM BY PATHOLOGIST: CPT | Performed by: PATHOLOGY

## 2021-09-20 RX ORDER — PROPOFOL 10 MG/ML
INJECTION, EMULSION INTRAVENOUS CONTINUOUS PRN
Status: DISCONTINUED | OUTPATIENT
Start: 2021-09-20 | End: 2021-09-20

## 2021-09-20 RX ORDER — DEXAMETHASONE SODIUM PHOSPHATE 4 MG/ML
INJECTION, SOLUTION INTRA-ARTICULAR; INTRALESIONAL; INTRAMUSCULAR; INTRAVENOUS; SOFT TISSUE PRN
Status: DISCONTINUED | OUTPATIENT
Start: 2021-09-20 | End: 2021-09-20

## 2021-09-20 RX ORDER — DOXYCYCLINE 100 MG/10ML
100 INJECTION, POWDER, LYOPHILIZED, FOR SOLUTION INTRAVENOUS ONCE
Status: COMPLETED | OUTPATIENT
Start: 2021-09-20 | End: 2021-09-20

## 2021-09-20 RX ORDER — ACETAMINOPHEN 325 MG/1
975 TABLET ORAL ONCE
Status: DISCONTINUED | OUTPATIENT
Start: 2021-09-20 | End: 2021-10-16 | Stop reason: HOSPADM

## 2021-09-20 RX ORDER — LIDOCAINE 40 MG/G
CREAM TOPICAL
Status: DISCONTINUED | OUTPATIENT
Start: 2021-09-20 | End: 2021-10-16 | Stop reason: HOSPADM

## 2021-09-20 RX ORDER — FENTANYL CITRATE 50 UG/ML
25 INJECTION, SOLUTION INTRAMUSCULAR; INTRAVENOUS EVERY 5 MIN PRN
Status: DISCONTINUED | OUTPATIENT
Start: 2021-09-20 | End: 2021-10-16 | Stop reason: HOSPADM

## 2021-09-20 RX ORDER — KETOROLAC TROMETHAMINE 30 MG/ML
INJECTION, SOLUTION INTRAMUSCULAR; INTRAVENOUS PRN
Status: DISCONTINUED | OUTPATIENT
Start: 2021-09-20 | End: 2021-09-20

## 2021-09-20 RX ORDER — OXYCODONE HYDROCHLORIDE 5 MG/1
5 TABLET ORAL EVERY 4 HOURS PRN
Status: DISCONTINUED | OUTPATIENT
Start: 2021-09-20 | End: 2021-10-16 | Stop reason: HOSPADM

## 2021-09-20 RX ORDER — FENTANYL CITRATE 50 UG/ML
INJECTION, SOLUTION INTRAMUSCULAR; INTRAVENOUS PRN
Status: DISCONTINUED | OUTPATIENT
Start: 2021-09-20 | End: 2021-09-20

## 2021-09-20 RX ORDER — PROPOFOL 10 MG/ML
INJECTION, EMULSION INTRAVENOUS PRN
Status: DISCONTINUED | OUTPATIENT
Start: 2021-09-20 | End: 2021-09-20

## 2021-09-20 RX ORDER — OXYCODONE HYDROCHLORIDE 5 MG/1
5 TABLET ORAL
Status: DISCONTINUED | OUTPATIENT
Start: 2021-09-20 | End: 2021-10-16 | Stop reason: HOSPADM

## 2021-09-20 RX ORDER — LIDOCAINE HYDROCHLORIDE 20 MG/ML
INJECTION, SOLUTION INFILTRATION; PERINEURAL PRN
Status: DISCONTINUED | OUTPATIENT
Start: 2021-09-20 | End: 2021-09-20

## 2021-09-20 RX ORDER — IBUPROFEN 400 MG/1
800 TABLET, FILM COATED ORAL ONCE
Status: DISCONTINUED | OUTPATIENT
Start: 2021-09-20 | End: 2021-10-16 | Stop reason: HOSPADM

## 2021-09-20 RX ORDER — SODIUM CHLORIDE, SODIUM LACTATE, POTASSIUM CHLORIDE, CALCIUM CHLORIDE 600; 310; 30; 20 MG/100ML; MG/100ML; MG/100ML; MG/100ML
INJECTION, SOLUTION INTRAVENOUS CONTINUOUS
Status: DISCONTINUED | OUTPATIENT
Start: 2021-09-20 | End: 2021-10-16 | Stop reason: HOSPADM

## 2021-09-20 RX ORDER — ONDANSETRON 4 MG/1
4 TABLET, ORALLY DISINTEGRATING ORAL EVERY 30 MIN PRN
Status: DISCONTINUED | OUTPATIENT
Start: 2021-09-20 | End: 2021-10-16 | Stop reason: HOSPADM

## 2021-09-20 RX ORDER — ONDANSETRON 2 MG/ML
INJECTION INTRAMUSCULAR; INTRAVENOUS PRN
Status: DISCONTINUED | OUTPATIENT
Start: 2021-09-20 | End: 2021-09-20

## 2021-09-20 RX ORDER — DOXYCYCLINE 100 MG/1
100 CAPSULE ORAL ONCE
Status: DISCONTINUED | OUTPATIENT
Start: 2021-09-20 | End: 2021-10-16 | Stop reason: HOSPADM

## 2021-09-20 RX ORDER — ACETAMINOPHEN 325 MG/1
975 TABLET ORAL ONCE
Status: COMPLETED | OUTPATIENT
Start: 2021-09-20 | End: 2021-09-20

## 2021-09-20 RX ORDER — MEPERIDINE HYDROCHLORIDE 25 MG/ML
12.5 INJECTION INTRAMUSCULAR; INTRAVENOUS; SUBCUTANEOUS
Status: DISCONTINUED | OUTPATIENT
Start: 2021-09-20 | End: 2021-10-16 | Stop reason: HOSPADM

## 2021-09-20 RX ORDER — HYDROXYZINE HYDROCHLORIDE 25 MG/1
25 TABLET, FILM COATED ORAL
Status: DISCONTINUED | OUTPATIENT
Start: 2021-09-20 | End: 2021-10-16 | Stop reason: HOSPADM

## 2021-09-20 RX ORDER — DOXYCYCLINE 100 MG/10ML
100 INJECTION, POWDER, LYOPHILIZED, FOR SOLUTION INTRAVENOUS ONCE
Status: DISCONTINUED | OUTPATIENT
Start: 2021-09-20 | End: 2021-09-20

## 2021-09-20 RX ORDER — ONDANSETRON 2 MG/ML
4 INJECTION INTRAMUSCULAR; INTRAVENOUS EVERY 30 MIN PRN
Status: DISCONTINUED | OUTPATIENT
Start: 2021-09-20 | End: 2021-10-16 | Stop reason: HOSPADM

## 2021-09-20 RX ORDER — BUPIVACAINE HYDROCHLORIDE AND EPINEPHRINE 2.5; 5 MG/ML; UG/ML
INJECTION, SOLUTION INFILTRATION; PERINEURAL PRN
Status: DISCONTINUED | OUTPATIENT
Start: 2021-09-20 | End: 2021-09-20 | Stop reason: HOSPADM

## 2021-09-20 RX ADMIN — KETOROLAC TROMETHAMINE 30 MG: 30 INJECTION, SOLUTION INTRAMUSCULAR; INTRAVENOUS at 14:32

## 2021-09-20 RX ADMIN — PROPOFOL 20 MG: 10 INJECTION, EMULSION INTRAVENOUS at 14:16

## 2021-09-20 RX ADMIN — DEXAMETHASONE SODIUM PHOSPHATE 4 MG: 4 INJECTION, SOLUTION INTRA-ARTICULAR; INTRALESIONAL; INTRAMUSCULAR; INTRAVENOUS; SOFT TISSUE at 14:22

## 2021-09-20 RX ADMIN — DOXYCYCLINE 100 MG: 100 INJECTION, POWDER, LYOPHILIZED, FOR SOLUTION INTRAVENOUS at 13:30

## 2021-09-20 RX ADMIN — PROPOFOL 150 MCG/KG/MIN: 10 INJECTION, EMULSION INTRAVENOUS at 14:17

## 2021-09-20 RX ADMIN — PROPOFOL 80 MG: 10 INJECTION, EMULSION INTRAVENOUS at 14:15

## 2021-09-20 RX ADMIN — FENTANYL CITRATE 25 MCG: 50 INJECTION, SOLUTION INTRAMUSCULAR; INTRAVENOUS at 14:15

## 2021-09-20 RX ADMIN — LIDOCAINE HYDROCHLORIDE 60 MG: 20 INJECTION, SOLUTION INFILTRATION; PERINEURAL at 14:15

## 2021-09-20 RX ADMIN — SODIUM CHLORIDE, SODIUM LACTATE, POTASSIUM CHLORIDE, CALCIUM CHLORIDE: 600; 310; 30; 20 INJECTION, SOLUTION INTRAVENOUS at 13:30

## 2021-09-20 RX ADMIN — ONDANSETRON 4 MG: 2 INJECTION INTRAMUSCULAR; INTRAVENOUS at 14:29

## 2021-09-20 RX ADMIN — ACETAMINOPHEN 975 MG: 325 TABLET ORAL at 13:27

## 2021-09-20 NOTE — TELEPHONE ENCOUNTER
Pt had a suction D&C today, 9/20.  She states she was told she needs to get a Rhogam injection prior to leaving.  Pt has left and is in the parking lot of [facility].  She did not get the Rhogam inj in post op and didn't even think about it when she left and is wondering if she got it already when she was under anesthesia.  I am not able to see in pt's chart if she was given Rhogam today.      Routing message to Dr. Mcpherson to see if pt received a Rhogam injection during her SD&C today and if not does she want pt to return to \[facility]\"" or be put on the women's nurse schedule.    Dr. Mcpherson suggested that I call up to \[facility]\"" to see if they gave Rhogam to pt and if not if pt can come back to \[facility]\"" to receive it.    Called and spoke with Margaret at \[facility]\"".  She was unable to see that pt received the Rhogam injection and suggested pt go back up to the surgery center so she can get her Rhogam injection.     Called and spoke with pt and she is on her way back up to the surgery center.    Paulina Aguilera, RN

## 2021-09-20 NOTE — ANESTHESIA CARE TRANSFER NOTE
Patient: Shayla Mcclure    Procedure(s):  DILATION AND CURETTAGE, UTERUS, USING SUCTION    Diagnosis: Missed  [O02.1]  Diagnosis Additional Information: No value filed.    Anesthesia Type:   MAC     Note:    Oropharynx: oropharynx clear of all foreign objects  Level of Consciousness: awake  Oxygen Supplementation: room air    Independent Airway: airway patency satisfactory and stable  Dentition: dentition unchanged  Vital Signs Stable: post-procedure vital signs reviewed and stable  Report to RN Given: handoff report given  Patient transferred to: Phase II  Comments: Patient awake, alert, and oriented. SpO2 98%.  No apparent anesthesia complications.   Handoff Report: Identifed the Patient, Identified the Reponsible Provider, Reviewed the pertinent medical history, Discussed the surgical course, Reviewed Intra-OP anesthesia mangement and issues during anesthesia, Set expectations for post-procedure period and Allowed opportunity for questions and acknowledgement of understanding      Vitals:  Vitals Value Taken Time   BP     Temp     Pulse     Resp     SpO2         Electronically Signed By: CLAUDIA Montaño CRNA  2021  2:45 PM

## 2021-09-20 NOTE — DISCHARGE INSTRUCTIONS
[facility]   Adult Discharge Orders & Instructions     For 24 hours after surgery    1. Get plenty of rest.  A responsible adult must stay with you for at least 24 hours after you leave the hospital.   2. Do not drive or use heavy equipment.  If you have weakness or tingling, don't drive or use heavy equipment until this feeling goes away.  3. Do not drink alcohol.  4. Avoid strenuous or risky activities.  Ask for help when climbing stairs.   5. You may feel lightheaded.  IF so, sit for a few minutes before standing.  Have someone help you get up.   6. If you have nausea (feel sick to your stomach): Drink only clear liquids such as apple juice, ginger ale, broth or 7-Up.  Rest may also help.  Be sure to drink enough fluids.  Move to a regular diet as you feel able.  7. You may have a slight fever. Call the doctor if your fever is over 100 F (37.7 C) (taken under the tongue) or lasts longer than 24 hours.  8. You may have a dry mouth, a sore throat, muscle aches or trouble sleeping.  These should go away after 24 hours.  9. Do not make important or legal decisions.     Call your doctor for any of the followin.  Signs of infection (fever, growing tenderness at the surgery site, a large amount of drainage or bleeding, severe pain, foul-smelling drainage, redness, swelling).    2. It has been over 8 to 10 hours since surgery and you are still not able to urinate (pass water).    3.  Headache for over 24 hours.    4.  Numbness, tingling or weakness the day after surgery (if you had spinal anesthesia).                  5. Signs of Covid-19 infection (temperature over 100 degrees, shortness of breath, cough, loss of taste/smell, generalized body aches, persistent headache,                  chills, sore throat, nausea/vomiting/diarrhea).    [phone]  
1st Trimester Sonogram

## 2021-09-20 NOTE — OP NOTE
HOSPITAL OPERATIVE NOTE  DATE/TIME OF SURGERY: 2021  PATIENT NAME: Shayla Mcclure  MRN: [id number]  PATIENT : 1985      Preoperative Diagnosis: MAB    Postoperative Diagnosis: same    Surgeon: Haydee Mcpherson DO    Assist: none    Procedure:  Suction D+C    Anesthesia: MAC with paracervical block    EBL:   5 ml    Urine output:    NA    IVF:  300  ml    Speciman:  Products of Conception    Findings: Anteverted, gravid uterus at 7 weeks gestation confirmed on EUA. Cervix dilated <1cm. No vaginal bleeding.    Complications:  None    Indication: Shayla Mcclure is a 36 year old  with MAB, measuring 7wks here for sD+C, declining expectant or medical management. Details of the procedure were discussed with the patient.  Risks include, but are not limited to, bleeding, infection, and injury to surrounding organs such as the bowel, urinary system, nerves, and blood vessels.  Injury may result in repair at the time of the surgery or in a separate procedure.  All questions answered, and accepting these risks, the patient elects to proceed with the procedure.      Procedure: Patient was taken to the operating room with IV fluids running where she was placed under MAC anesthesia without difficulty.  She was placed on dorsal supine position with feet and yellowfin stirrups.  She was prepped and draped in the normal sterile fashion.    A medium graves speculum was placed in the vagina to help visualize the cervix, the anterior lip of the cervix was grasped with a single-tooth tenaculum.  A paracervical block was placed at 4 and 8 o'clock in standard fashion using 1% lidocaine with epi. The cervix was sequentially dilated with Frederick dilators to 21 without difficulty.  A 8mm curved suction curette was advanced to the uterine fundus and activated per protocol with removal of products of conception. After complete removal products, the suction curette was removed as was the single-tooth tenaculum.   Good hemostasis  was noted and all instruments were removed from the vagina.  The procedure was then ended.    Patient was awakened from anesthesia without difficulty and taken to PACU in stable condition.  All insurance sponge counts were correct x2. Given history of recurrent pregnancy loss, patient was offered genetic testing, however she declined. She would like to take home the POC, and consent has been signed. Patient aware will need to  from lab at a later date.  Patient is Rh negative and Rhogam is ordered for post-op.    Haydee Mcpherson DO

## 2021-09-20 NOTE — ANESTHESIA PREPROCEDURE EVALUATION
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     History of severe pre-eclampsia     delivered at 27w3d     Leiomyoma     prior laparotomy for two large fibroids       Past Surgical History:   Procedure Laterality Date     C/SECTION, LOW TRANSVERSE      delivered at 27w3d for pre-eclampsia with severe features      COSMETIC MAMMOPLASTY AUGMENTATION BILATERAL      when 16 years old     DILATION AND CURETTAGE SUCTION N/A 2020    Procedure: DILATION AND CURETTAGE, UTERUS, USING SUCTION;  Surgeon: Haydee Mcpherson DO;  Location: [facility]     MYOMECTOMY UTERUS  2012    open myomectomy in [place], reported very large fibroids      Allergies   Allergen Reactions     Clindamycin       Social History     Tobacco Use     Smoking status: Never Smoker     Smokeless tobacco: Never Used   Substance Use Topics     Alcohol use: Yes     Comment: occasionally      Wt Readings from Last 1 Encounters:   21 57.6 kg (127 lb)        Anesthesia Evaluation            ROS/MED HX  ENT/Pulmonary:  - neg pulmonary ROS     Neurologic:  - neg neurologic ROS     Cardiovascular:  - neg cardiovascular ROS     METS/Exercise Tolerance:     Hematologic:  - neg hematologic  ROS     Musculoskeletal:  - neg musculoskeletal ROS     GI/Hepatic:  - neg GI/hepatic ROS     Renal/Genitourinary:  - neg Renal ROS     Endo:  - neg endo ROS     Psychiatric/Substance Use:  - neg psychiatric ROS     Infectious Disease:  - neg infectious disease ROS     Malignancy:  - neg malignancy ROS     Other:  - neg other ROS          Physical Exam    Airway        Mallampati: I   TM distance: > 3 FB   Neck ROM: full   Mouth opening: > 3 cm    Respiratory Devices and Support         Dental  no notable  dental history         Cardiovascular   cardiovascular exam normal          Pulmonary   pulmonary exam normal                OUTSIDE LABS:  CBC:   Lab Results   Component Value Date    WBC 8.7 08/23/2021    WBC 7.5 04/16/2020    HGB 13.8 08/23/2021    HGB 13.0 04/16/2020    HGB 13.2 04/16/2020    HCT 40.2 08/23/2021    HCT 39.2 04/16/2020     08/23/2021     04/16/2020     BMP:   Lab Results   Component Value Date     03/29/2020     12/31/2019    POTASSIUM 3.0 (L) 03/29/2020    POTASSIUM 4.3 12/31/2019    CHLORIDE 107 03/29/2020    CHLORIDE 105 12/31/2019    CO2 21 03/29/2020    CO2 29 12/31/2019    BUN 13 03/29/2020    BUN 13 12/31/2019    CR 0.51 (L) 03/29/2020    CR 0.46 (L) 03/09/2020     (H) 03/29/2020    GLC 77 12/31/2019     COAGS:   Lab Results   Component Value Date    PTT 27 03/29/2020    INR 0.93 03/29/2020     POC:   Lab Results   Component Value Date    HCG Positive (A) 03/09/2020    HCGS Negative 12/31/2019     HEPATIC:   Lab Results   Component Value Date    ALBUMIN 4.0 12/31/2019    PROTTOTAL 7.5 12/31/2019    ALT 46 03/09/2020    AST 13 03/09/2020    ALKPHOS 61 12/31/2019    BILITOTAL 0.5 12/31/2019     OTHER:   Lab Results   Component Value Date    KASSANDRA 8.8 03/29/2020    TSH 1.21 10/15/2020    CRP <2.9 10/15/2020       Anesthesia Plan    ASA Status:  1   NPO Status:  NPO Appropriate    Anesthesia Type: MAC.     - Reason for MAC: straight local not clinically adequate   Induction: Intravenous.   Maintenance: TIVA.        Consents    Anesthesia Plan(s) and associated risks, benefits, and realistic alternatives discussed. Questions answered and patient/representative(s) expressed understanding.     - Discussed with:  Patient         Postoperative Care    Pain management: IV analgesics, Multi-modal analgesia.   PONV prophylaxis: Background Propofol Infusion     Comments:                MUMTAZ TARANGO MD

## 2021-09-20 NOTE — ANESTHESIA POSTPROCEDURE EVALUATION
Patient: Shayla Mcclure    Procedure(s):  DILATION AND CURETTAGE, UTERUS, USING SUCTION    Diagnosis:Missed  [O02.1]  Diagnosis Additional Information: No value filed.    Anesthesia Type:  MAC    Note:  Disposition: Outpatient   Postop Pain Control: Uneventful            Sign Out: Well controlled pain   PONV: No   Neuro/Psych: Uneventful            Sign Out: Acceptable/Baseline neuro status   Airway/Respiratory: Uneventful            Sign Out: Acceptable/Baseline resp. status   CV/Hemodynamics: Uneventful            Sign Out: Acceptable CV status; No obvious hypovolemia; No obvious fluid overload   Other NRE: NONE   DID A NON-ROUTINE EVENT OCCUR? No           Last vitals:  Vitals Value Taken Time   /60 21 1444   Temp 98.1  F (36.7  C) 21 1444   Pulse     Resp 16 21 1444   SpO2 98 % 21 1444       Electronically Signed By: MUMTAZ TARANGO MD  2021  2:47 PM

## 2021-09-22 ENCOUNTER — NURSE TRIAGE (OUTPATIENT)
Dept: NURSING | Facility: CLINIC | Age: 36
End: 2021-09-22

## 2021-09-22 LAB
PATH REPORT.COMMENTS IMP SPEC: NORMAL
PATH REPORT.COMMENTS IMP SPEC: NORMAL
PATH REPORT.FINAL DX SPEC: NORMAL
PATH REPORT.GROSS SPEC: NORMAL
PATH REPORT.MICROSCOPIC SPEC OTHER STN: NORMAL
PATH REPORT.RELEVANT HX SPEC: NORMAL
PHOTO IMAGE: NORMAL

## 2021-09-22 PROCEDURE — 99285 EMERGENCY DEPT VISIT HI MDM: CPT | Performed by: FAMILY MEDICINE

## 2021-09-22 PROCEDURE — 99284 EMERGENCY DEPT VISIT MOD MDM: CPT | Mod: 25 | Performed by: FAMILY MEDICINE

## 2021-09-23 ENCOUNTER — HOSPITAL ENCOUNTER (EMERGENCY)
Facility: CLINIC | Age: 36
Discharge: ANOTHER HOSPITAL | End: 2021-09-23
Attending: FAMILY MEDICINE | Admitting: FAMILY MEDICINE
Payer: COMMERCIAL

## 2021-09-23 ENCOUNTER — TRANSFERRED RECORDS (OUTPATIENT)
Dept: HEALTH INFORMATION MANAGEMENT | Facility: CLINIC | Age: 36
End: 2021-09-23

## 2021-09-23 ENCOUNTER — APPOINTMENT (OUTPATIENT)
Dept: ULTRASOUND IMAGING | Facility: CLINIC | Age: 36
End: 2021-09-23
Attending: FAMILY MEDICINE
Payer: COMMERCIAL

## 2021-09-23 VITALS
RESPIRATION RATE: 16 BRPM | WEIGHT: 127 LBS | SYSTOLIC BLOOD PRESSURE: 105 MMHG | DIASTOLIC BLOOD PRESSURE: 64 MMHG | OXYGEN SATURATION: 99 % | BODY MASS INDEX: 24.67 KG/M2 | TEMPERATURE: 98.1 F | HEART RATE: 84 BPM

## 2021-09-23 DIAGNOSIS — N99.820 POSTOPERATIVE VAGINAL BLEEDING FOLLOWING GENITOURINARY PROCEDURE: ICD-10-CM

## 2021-09-23 DIAGNOSIS — R10.2 PELVIC CRAMPING: ICD-10-CM

## 2021-09-23 LAB
ABO/RH(D): ABNORMAL
ALBUMIN UR-MCNC: NEGATIVE MG/DL
AMORPH CRY #/AREA URNS HPF: ABNORMAL /HPF
ANION GAP SERPL CALCULATED.3IONS-SCNC: 4 MMOL/L (ref 3–14)
ANTIBODY ID: NORMAL
ANTIBODY SCREEN: POSITIVE
APPEARANCE UR: CLEAR
BASOPHILS # BLD AUTO: 0.1 10E3/UL (ref 0–0.2)
BASOPHILS NFR BLD AUTO: 1 %
BILIRUB UR QL STRIP: NEGATIVE
BUN SERPL-MCNC: 16 MG/DL (ref 7–30)
CALCIUM SERPL-MCNC: 8.8 MG/DL (ref 8.5–10.1)
CHLORIDE BLD-SCNC: 109 MMOL/L (ref 94–109)
CO2 SERPL-SCNC: 25 MMOL/L (ref 20–32)
COLOR UR AUTO: COLORLESS
CREAT SERPL-MCNC: 0.62 MG/DL (ref 0.52–1.04)
CRP SERPL-MCNC: <2.9 MG/L (ref 0–8)
EOSINOPHIL # BLD AUTO: 0.3 10E3/UL (ref 0–0.7)
EOSINOPHIL NFR BLD AUTO: 2 %
ERYTHROCYTE [DISTWIDTH] IN BLOOD BY AUTOMATED COUNT: 12.5 % (ref 10–15)
GFR SERPL CREATININE-BSD FRML MDRD: >90 ML/MIN/1.73M2
GLUCOSE BLD-MCNC: 81 MG/DL (ref 70–99)
GLUCOSE UR STRIP-MCNC: NEGATIVE MG/DL
HCT VFR BLD AUTO: 38 % (ref 35–47)
HGB BLD-MCNC: 13.3 G/DL (ref 11.7–15.7)
HGB UR QL STRIP: ABNORMAL
IMM GRANULOCYTES # BLD: 0 10E3/UL
IMM GRANULOCYTES NFR BLD: 0 %
INR PPP: 0.9 (ref 0.85–1.15)
KETONES UR STRIP-MCNC: NEGATIVE MG/DL
LEUKOCYTE ESTERASE UR QL STRIP: NEGATIVE
LYMPHOCYTES # BLD AUTO: 4.2 10E3/UL (ref 0.8–5.3)
LYMPHOCYTES NFR BLD AUTO: 30 %
MCH RBC QN AUTO: 30.6 PG (ref 26.5–33)
MCHC RBC AUTO-ENTMCNC: 35 G/DL (ref 31.5–36.5)
MCV RBC AUTO: 88 FL (ref 78–100)
MONOCYTES # BLD AUTO: 0.7 10E3/UL (ref 0–1.3)
MONOCYTES NFR BLD AUTO: 5 %
NEUTROPHILS # BLD AUTO: 8.8 10E3/UL (ref 1.6–8.3)
NEUTROPHILS NFR BLD AUTO: 62 %
NITRATE UR QL: NEGATIVE
NRBC # BLD AUTO: 0 10E3/UL
NRBC BLD AUTO-RTO: 0 /100
PH UR STRIP: 7 [PH] (ref 5–7)
PLATELET # BLD AUTO: 339 10E3/UL (ref 150–450)
POTASSIUM BLD-SCNC: 3.8 MMOL/L (ref 3.4–5.3)
RBC # BLD AUTO: 4.34 10E6/UL (ref 3.8–5.2)
RBC URINE: 38 /HPF
SODIUM SERPL-SCNC: 138 MMOL/L (ref 133–144)
SP GR UR STRIP: 1 (ref 1–1.03)
SPECIMEN EXPIRATION DATE: ABNORMAL
SPECIMEN EXPIRATION DATE: NORMAL
SQUAMOUS EPITHELIAL: <1 /HPF
UROBILINOGEN UR STRIP-MCNC: NORMAL MG/DL
WBC # BLD AUTO: 14.1 10E3/UL (ref 4–11)
WBC URINE: 3 /HPF

## 2021-09-23 PROCEDURE — 87205 SMEAR GRAM STAIN: CPT | Performed by: FAMILY MEDICINE

## 2021-09-23 PROCEDURE — 250N000011 HC RX IP 250 OP 636: Performed by: FAMILY MEDICINE

## 2021-09-23 PROCEDURE — 86140 C-REACTIVE PROTEIN: CPT | Performed by: FAMILY MEDICINE

## 2021-09-23 PROCEDURE — 96360 HYDRATION IV INFUSION INIT: CPT | Mod: 59 | Performed by: FAMILY MEDICINE

## 2021-09-23 PROCEDURE — 96375 TX/PRO/DX INJ NEW DRUG ADDON: CPT | Performed by: FAMILY MEDICINE

## 2021-09-23 PROCEDURE — 96374 THER/PROPH/DIAG INJ IV PUSH: CPT | Mod: 59 | Performed by: FAMILY MEDICINE

## 2021-09-23 PROCEDURE — 81001 URINALYSIS AUTO W/SCOPE: CPT | Performed by: FAMILY MEDICINE

## 2021-09-23 PROCEDURE — 258N000003 HC RX IP 258 OP 636: Performed by: FAMILY MEDICINE

## 2021-09-23 PROCEDURE — 93976 VASCULAR STUDY: CPT

## 2021-09-23 PROCEDURE — 96361 HYDRATE IV INFUSION ADD-ON: CPT | Performed by: FAMILY MEDICINE

## 2021-09-23 PROCEDURE — 86870 RBC ANTIBODY IDENTIFICATION: CPT | Performed by: FAMILY MEDICINE

## 2021-09-23 PROCEDURE — 36415 COLL VENOUS BLD VENIPUNCTURE: CPT | Performed by: FAMILY MEDICINE

## 2021-09-23 PROCEDURE — 85610 PROTHROMBIN TIME: CPT | Performed by: FAMILY MEDICINE

## 2021-09-23 PROCEDURE — 85025 COMPLETE CBC W/AUTO DIFF WBC: CPT | Performed by: FAMILY MEDICINE

## 2021-09-23 PROCEDURE — 86900 BLOOD TYPING SEROLOGIC ABO: CPT | Performed by: FAMILY MEDICINE

## 2021-09-23 PROCEDURE — 80048 BASIC METABOLIC PNL TOTAL CA: CPT | Performed by: FAMILY MEDICINE

## 2021-09-23 RX ORDER — ONDANSETRON 2 MG/ML
4 INJECTION INTRAMUSCULAR; INTRAVENOUS EVERY 30 MIN PRN
Status: DISCONTINUED | OUTPATIENT
Start: 2021-09-23 | End: 2021-09-23 | Stop reason: HOSPADM

## 2021-09-23 RX ORDER — SODIUM CHLORIDE 9 MG/ML
INJECTION, SOLUTION INTRAVENOUS CONTINUOUS
Status: DISCONTINUED | OUTPATIENT
Start: 2021-09-23 | End: 2021-09-23 | Stop reason: HOSPADM

## 2021-09-23 RX ORDER — HYDROMORPHONE HYDROCHLORIDE 1 MG/ML
0.5 INJECTION, SOLUTION INTRAMUSCULAR; INTRAVENOUS; SUBCUTANEOUS
Status: DISCONTINUED | OUTPATIENT
Start: 2021-09-23 | End: 2021-09-23 | Stop reason: HOSPADM

## 2021-09-23 RX ADMIN — SODIUM CHLORIDE 1000 ML: 9 INJECTION, SOLUTION INTRAVENOUS at 00:46

## 2021-09-23 RX ADMIN — ONDANSETRON 4 MG: 2 INJECTION INTRAMUSCULAR; INTRAVENOUS at 01:12

## 2021-09-23 RX ADMIN — SODIUM CHLORIDE 1000 ML: 9 INJECTION, SOLUTION INTRAVENOUS at 01:53

## 2021-09-23 RX ADMIN — HYDROMORPHONE HYDROCHLORIDE 0.5 MG: 1 INJECTION, SOLUTION INTRAMUSCULAR; INTRAVENOUS; SUBCUTANEOUS at 01:13

## 2021-09-23 ASSESSMENT — ENCOUNTER SYMPTOMS
POLYDIPSIA: 0
ABDOMINAL PAIN: 1
DIAPHORESIS: 1
FLANK PAIN: 1
FEVER: 0
APPETITE CHANGE: 0
LIGHT-HEADEDNESS: 1
EYES NEGATIVE: 1
ACTIVITY CHANGE: 1
FATIGUE: 1
CHILLS: 1
FREQUENCY: 1
PSYCHIATRIC NEGATIVE: 1
BACK PAIN: 1
HEMATOLOGIC/LYMPHATIC NEGATIVE: 1
RESPIRATORY NEGATIVE: 1
POLYPHAGIA: 0

## 2021-09-23 NOTE — LETTER
[facility]  Emergency Room  [address] [place]   [address]  Tel: [phone]   Fax: [phone]  2021    Shayla Mcclure  [address]  [facility] [address]  [phone] (home)     : 1985          To Whom it May Concern:    Shayla Mcclure was seen in our ER today 2021. I expect her condition to improve over the next 2-3 days.  She asked that I generate this note for her , George,  who was in the emergency room with her throughout the night and this early morning.  Please excuse him from work today.      Please contact me for questions or concerns.    Sincerely,      Vernon Clark, DO  Physician  [facility] Emergency Room

## 2021-09-23 NOTE — ED TRIAGE NOTES
Pt was about 8 weeks pregnant had US, no heartbeat. About 9.5 weeks pt has f/u US and again no hearbeat. Pt had D&C on Monday and had some spotting and abd cramping which was told to be normal, sx improved. Today having heavier bleeding, severe cramping 10/10 called nurse line told to take 400 mg iburofen, wasn't helping, then took 400 mg again and helped somewhat with pain but is very dizzy, lightheaded, 'feeling like going to pass out' tingling in hands.

## 2021-09-23 NOTE — ED PROVIDER NOTES
History     Chief Complaint   Patient presents with     Vaginal Bleeding     Abdominal Pain     HPI  Shayla Mcclure is a 36 year old female who presents to the ER with concerns about somewhat sudden onset of severe lower abdominal and pelvic cramping associated with some increased vaginal bleeding and frequency of urination started about 2 hours ago.  Patient states that she had a D&C performed because of a miscarriage on Monday of this week, 3 days ago.  She had been doing well and using some occasional ibuprofen as needed for tenderness.  She is states that she awoke yesterday feeling much improved and actually was quite a bit more active during the day but about 2 hours prior to coming to the ER she developed sudden onset of severe pain which she rated 10 out of 10.  Patient states that she was diaphoretic and had difficulty walking.  She was caring for her 6-year-old special needs daughter at home by herself at the time and so was able to contact her  who was at work who came home to assist her.  She stated she contacted the phone assist line/nurse and was told to try additional ibuprofen.  She states that the additional ibuprofen did seem to help the pain some and she currently rates her pain at about a 5 out of 10 but felt she needed to come in to get checked because she was feeling dizzy and lightheaded.      I reviewed the following nurse triage note:  Pt was about 8 weeks pregnant had US, no heartbeat. About 9.5 weeks pt has f/u US and again no hearbeat. Pt had D&C on Monday and had some spotting and abd cramping which was told to be normal, sx improved. Today having heavier bleeding, severe cramping 10/10 called nurse line told to take 400 mg iburofen, wasn't helping, then took 400 mg again and helped somewhat with pain but is very dizzy, lightheaded, 'feeling like going to pass out' tingling in hands.       I reviewed a operative note from 2 days ago found in the epic EMR and copied the note  below:  HOSPITAL OPERATIVE NOTE  DATE/TIME OF SURGERY: 2021  PATIENT NAME: Shayla Mcclure  MRN: [id number]  PATIENT : 1985        Preoperative Diagnosis: MAB     Postoperative Diagnosis: same     Surgeon: Haydee Mcpherson DO     Assist: none     Procedure:  Suction D+C     Anesthesia: MAC with paracervical block     EBL:   5 ml     Urine output:    NA     IVF:  300  ml     Speciman:  Products of Conception     Findings: Anteverted, gravid uterus at 7 weeks gestation confirmed on EUA. Cervix dilated <1cm. No vaginal bleeding.     Complications:  None     Indication: Shayla Mcclure is a 36 year old  with MAB, measuring 7wks here for sD+C, declining expectant or medical management. Details of the procedure were discussed with the patient.  Risks include, but are not limited to, bleeding, infection, and injury to surrounding organs such as the bowel, urinary system, nerves, and blood vessels.  Injury may result in repair at the time of the surgery or in a separate procedure.  All questions answered, and accepting these risks, the patient elects to proceed with the procedure.        Procedure: Patient was taken to the operating room with IV fluids running where she was placed under MAC anesthesia without difficulty.  She was placed on dorsal supine position with feet and yellowfin stirrups.  She was prepped and draped in the normal sterile fashion.    A medium graves speculum was placed in the vagina to help visualize the cervix, the anterior lip of the cervix was grasped with a single-tooth tenaculum.  A paracervical block was placed at 4 and 8 o'clock in standard fashion using 1% lidocaine with epi. The cervix was sequentially dilated with Frederick dilators to 21 without difficulty.  A 8mm curved suction curette was advanced to the uterine fundus and activated per protocol with removal of products of conception. After complete removal products, the suction curette was removed as was the single-tooth  tenaculum.   Good hemostasis was noted and all instruments were removed from the vagina.  The procedure was then ended.    Patient was awakened from anesthesia without difficulty and taken to PACU in stable condition.  All insurance sponge counts were correct x2. Given history of recurrent pregnancy loss, patient was offered genetic testing, however she declined. She would like to take home the POC, and consent has been signed. Patient aware will need to  from lab at a later date.  Patient is Rh negative and Rhogam is ordered for post-op.     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)      History of severe pre-eclampsia      Leiomyoma        Past Surgical History:    Past Surgical History:   Procedure Laterality Date     C/SECTION, LOW TRANSVERSE      delivered at 27w3d for pre-eclampsia with severe features      COSMETIC MAMMOPLASTY AUGMENTATION BILATERAL      when 16 years old     DILATION AND CURETTAGE SUCTION N/A 2020    Procedure: DILATION AND CURETTAGE, UTERUS, USING SUCTION;  Surgeon: Haydee Mcpherson DO;  Location: [facility]     MYOMECTOMY UTERUS      open myomectomy in [place], reported very large fibroids       Family History:    Family History   Problem Relation Age of Onset     Other - See Comments Mother         heavy menstrual cycles     Hashimoto's thyroiditis Mother      Lupus Father      Lupus Maternal Aunt      Leukemia Maternal Grandmother      Cancer No family hx of          Scooter's Syndrome       Social History:  Marital Status:   [2]  Social History     Tobacco Use     Smoking status: Never Smoker     Smokeless tobacco: Never Used   Vaping Use     Vaping Use: Never used   Substance Use Topics     Alcohol use: Yes     Comment: occasionally     Drug use: No        Medications:    Prenatal Vit-Fe Fumarate-FA (PRENATAL MULTIVITAMIN W/IRON) 27-0.8 MG tablet          Review of Systems   Constitutional: Positive for activity change, chills, diaphoresis (with severe pain episodes.) and fatigue. Negative for appetite change and fever.   HENT: Negative.    Eyes: Negative.    Respiratory: Negative.    Gastrointestinal: Positive for abdominal pain.   Endocrine: Negative for polydipsia, polyphagia and polyuria.   Genitourinary: Positive for flank pain (Bilateral), frequency, pelvic pain (She states that the pain comes in waves and is almost contraction-like at times.), urgency and vaginal bleeding. Negative for vaginal discharge.   Musculoskeletal: Positive for back pain.   Skin: Negative.  Negative for rash.   Neurological: Positive for light-headedness.   Hematological: Negative.    Psychiatric/Behavioral: Negative.    All other systems reviewed and are negative.      Physical Exam   BP: (!) 124/93  Pulse: 86  Temp: 98.2  F (36.8  C)  Resp: 18  Weight: 57.6 kg (127 lb)  SpO2: 99 %  Vitals:    09/23/21 0018 09/23/21 0020 09/23/21 0115 09/23/21 0116   BP: (!) 124/93  116/75    Pulse: 86  91    Resp: 18      Temp: 98.2  F (36.8  C)  98.1  F (36.7  C)    TempSrc: Oral  Oral    SpO2: 99% 99% 99% 99%   Weight: 57.6 kg (127 lb)            Physical Exam  Vitals and nursing note reviewed. Exam conducted with a chaperone present.   Constitutional:       General: She is in acute distress.      Appearance: She is well-developed. She is not toxic-appearing or diaphoretic.   HENT:      Head: Normocephalic and atraumatic.   Eyes:      General: No scleral icterus.     Extraocular Movements:  Extraocular movements intact.      Pupils: Pupils are equal, round, and reactive to light.   Cardiovascular:      Rate and Rhythm: Normal rate.   Pulmonary:      Effort: Pulmonary effort is normal. No respiratory distress.   Abdominal:      General: Abdomen is protuberant. Bowel sounds are increased. There are no signs of injury.      Palpations: Abdomen is soft. There is no pulsatile mass.      Tenderness: There is abdominal tenderness in the right lower quadrant, suprapubic area and left lower quadrant. There is guarding and rebound.      Hernia: No hernia is present.   Skin:     Capillary Refill: Capillary refill takes less than 2 seconds.   Neurological:      Mental Status: She is alert and oriented to person, place, and time.   Psychiatric:         Mood and Affect: Mood normal.         Behavior: Behavior normal.         ED Course        Procedures              Critical Care time:  none               Results for orders placed or performed during the hospital encounter of 09/23/21 (from the past 24 hour(s))   CBC with platelets differential    Narrative    The following orders were created for panel order CBC with platelets differential.  Procedure                               Abnormality         Status                     ---------                               -----------         ------                     CBC with platelets and d...[[id number]]  Abnormal            Final result                 Please view results for these tests on the individual orders.   Basic metabolic panel   Result Value Ref Range    Sodium 138 133 - 144 mmol/L    Potassium 3.8 3.4 - 5.3 mmol/L    Chloride 109 94 - 109 mmol/L    Carbon Dioxide (CO2) 25 20 - 32 mmol/L    Anion Gap 4 3 - 14 mmol/L    Urea Nitrogen 16 7 - 30 mg/dL    Creatinine 0.62 0.52 - 1.04 mg/dL    Calcium 8.8 8.5 - 10.1 mg/dL    Glucose 81 70 - 99 mg/dL    GFR Estimate >90 >60 mL/min/1.73m2   CRP inflammation   Result Value Ref Range    CRP Inflammation <2.9 0.0 - 8.0  mg/L   INR   Result Value Ref Range    INR 0.90 0.85 - 1.15   ABO/Rh type and screen    Narrative    The following orders were created for panel order ABO/Rh type and screen.  Procedure                               Abnormality         Status                     ---------                               -----------         ------                     Adult Type and Screen[[id number]]                            In process                   Please view results for these tests on the individual orders.   CBC with platelets and differential   Result Value Ref Range    WBC Count 14.1 (H) 4.0 - 11.0 10e3/uL    RBC Count 4.34 3.80 - 5.20 10e6/uL    Hemoglobin 13.3 11.7 - 15.7 g/dL    Hematocrit 38.0 35.0 - 47.0 %    MCV 88 78 - 100 fL    MCH 30.6 26.5 - 33.0 pg    MCHC 35.0 31.5 - 36.5 g/dL    RDW 12.5 10.0 - 15.0 %    Platelet Count 339 150 - 450 10e3/uL    % Neutrophils 62 %    % Lymphocytes 30 %    % Monocytes 5 %    % Eosinophils 2 %    % Basophils 1 %    % Immature Granulocytes 0 %    NRBCs per 100 WBC 0 <1 /100    Absolute Neutrophils 8.8 (H) 1.6 - 8.3 10e3/uL    Absolute Lymphocytes 4.2 0.8 - 5.3 10e3/uL    Absolute Monocytes 0.7 0.0 - 1.3 10e3/uL    Absolute Eosinophils 0.3 0.0 - 0.7 10e3/uL    Absolute Basophils 0.1 0.0 - 0.2 10e3/uL    Absolute Immature Granulocytes 0.0 <=0.0 10e3/uL    Absolute NRBCs 0.0 10e3/uL   US Pelvis Complete without Transvaginal    Narrative    EXAM: US PELVIS COMPLETE WITHOUT TRANSVAGINAL  LOCATION: [facility]  DATE/TIME: 9/23/2021 12:40 AM    INDICATION: 3 days post D&C secondary to miscarriage with sudden onset of severe cramping pain with vaginal bleeding  COMPARISON: 09/14/2021.  TECHNIQUE: Transabdominal scans were performed.    FINDINGS:    UTERUS: 11.8 x 5.7 x 4.4 cm. Heterogeneous echotexture with diffusely increased vascularity throughout the myometrium, and prominent focus of vascularity abutting the posterior aspect of the  endometrium.    ENDOMETRIUM: 14 mm. There are areas of color Doppler hypervascularity in the fundal aspect of the endometrium as seen on image 18 which demonstrate low resistance arterialized waveforms on spectral Doppler interrogation.    RIGHT OVARY: 4.6 x 3.1 x 3.0 cm. Multiple follicles. Normal Doppler flow.     LEFT OVARY: Nonvisualized due to bowel gas.     No significant free fluid.      Impression    IMPRESSION:  1.  Areas of color Doppler hypervascularity within the fundal aspect of the endometrium demonstrating low-resistance arterialized waveforms. Findings may reflect the sequela of retained products of conception though alternatively, while uncommon, an   acquired uterine arteriovenous malformation following curettage could also have the same appearance. Recommend OB/GYN consultation.         UA with Microscopic reflex to Culture    Specimen: Urine, Clean Catch   Result Value Ref Range    Color Urine Colorless Colorless, Straw, Light Yellow, Yellow    Appearance Urine Clear Clear    Glucose Urine Negative Negative mg/dL    Bilirubin Urine Negative Negative    Ketones Urine Negative Negative mg/dL    Specific Gravity Urine 1.003 1.003 - 1.035    Blood Urine Large (A) Negative    pH Urine 7.0 5.0 - 7.0    Protein Albumin Urine Negative Negative mg/dL    Urobilinogen Urine Normal Normal, 2.0 mg/dL    Nitrite Urine Negative Negative    Leukocyte Esterase Urine Negative Negative    Amorphous Crystals Urine Few (A) None Seen /HPF    RBC Urine 38 (H) <=2 /HPF    WBC Urine 3 <=5 /HPF    Squamous Epithelials Urine <1 <=1 /HPF    Narrative    Urine Culture not indicated       Medications   0.9% sodium chloride BOLUS (0 mLs Intravenous Stopped 9/23/21 0152)     Followed by   sodium chloride 0.9% infusion (1,000 mLs Intravenous New Bag 9/23/21 0153)   HYDROmorphone (PF) (DILAUDID) injection 0.5 mg (0.5 mg Intravenous Given 9/23/21 0113)   ondansetron (ZOFRAN) injection 4 mg (4 mg Intravenous Given 9/23/21 0112)        Assessments & Plan (with Medical Decision Making)  36-year-old female to the ER secondary concerns of post D&C complications.  Patient with a D&C performed secondary to miscarriage at approximately 7 weeks gestation 3 days ago.  Was doing well post surgically until last night when she developed sudden onset of severe lower abdominal and pelvic pain associated with vaginal bleeding and lightheadedness.  Her exam findings consistent with retained products of conception versus AV malformation of the uterus.  I spoke to our OB on-call physician, Dr. Bauman.  He stated that he prefer I talked to Dr. Conklin's on-call group as he did not feel comfortable performing another D&C on this patient given the vascularity seen on the ultrasound.  I spoke with Dr. Bradley, OB/GYN specialist, who recommended transfer the patient to the [facility] where he will see the patient and likely perform repeat D&C.  Patient notified of the plan of care and was in agreement.  Patient be transported by an ambulance. EMTALA and transfer forms were filled out and signed. AVS was printed and sent with the patient and EMS crew. The patient will be transported by ALS ambulance to the [facility].       I have reviewed the nursing notes.    I have reviewed the findings, diagnosis, plan and need for transfer with the patient.     Final diagnoses:   Postoperative vaginal bleeding following genitourinary procedure   Pelvic cramping       9/22/2021   [facility] EMERGENCY DEPT     Vernon Clark, DO  09/23/21 0314

## 2021-09-23 NOTE — TELEPHONE ENCOUNTER
"Patient states she had a miscarriage followed by a D&C on Monday. She was feeling better today and did not feel she needed to take any pain medication.  2 hrs ago she began having really bad cramping. She took Ibuprofen 400mg 1 hr ago--it is not helping.  She states that this is the same pain when she had a miscarriage last year with contractions when she had it naturally. She had a D&C also afterwards.   Currently the pain =\"9\" cramping, intermittent. It varies in intensity. In her lower back and abdomen. She is having a little bit of vaginal bleeding.that just started. She has not bled much today.   OBGYN: Dr Mcpherson @ [facility].   \"I am dizzy, I am able to stand and walk without assistance\". Dizziness began 2 weeks ago, discussed with .   She is currently alone with a 6 yr old special needs child.  works: he left for work @ 8pm.    Per post op instructions: Contact surgeon now.  Answering service contacted @ 10:33pm  OBGYN=Dr Humberto Bradley on call paged @ 10:34pm. Orders given:   Suspected uterine cramping pain. Could try another 400mg of Ibuprofen now, total dose= 800mg. Alternate with Tylenol. If pain is relieved, then continue with home care.. If pain is not relieved or if she begins bleeding heavily, then she needs to go to the ER. She could also apply a Cold pack to her lower abdomen.     Contacted patient with this information. She verbalized understanding. Also recommended she contact her , so he is aware of how she is doing and may assist her if needed.     Juliana Evans RN Triage Nurse Advisor 10:50 PM 9/22/2021           "

## 2021-09-25 LAB
BACTERIA VAG AEROBE CULT: ABNORMAL
GRAM STAIN RESULT: ABNORMAL
GRAM STAIN RESULT: ABNORMAL

## 2021-09-28 ENCOUNTER — ALLIED HEALTH/NURSE VISIT (OUTPATIENT)
Dept: FAMILY MEDICINE | Facility: CLINIC | Age: 36
End: 2021-09-28
Payer: COMMERCIAL

## 2021-09-28 ENCOUNTER — TELEPHONE (OUTPATIENT)
Dept: FAMILY MEDICINE | Facility: CLINIC | Age: 36
End: 2021-09-28

## 2021-09-28 DIAGNOSIS — Z20.822 SUSPECTED COVID-19 VIRUS INFECTION: ICD-10-CM

## 2021-09-28 PROCEDURE — U0005 INFEC AGEN DETEC AMPLI PROBE: HCPCS

## 2021-09-28 PROCEDURE — 99207 PR NO CHARGE NURSE ONLY: CPT

## 2021-09-28 PROCEDURE — U0003 INFECTIOUS AGENT DETECTION BY NUCLEIC ACID (DNA OR RNA); SEVERE ACUTE RESPIRATORY SYNDROME CORONAVIRUS 2 (SARS-COV-2) (CORONAVIRUS DISEASE [COVID-19]), AMPLIFIED PROBE TECHNIQUE, MAKING USE OF HIGH THROUGHPUT TECHNOLOGIES AS DESCRIBED BY CMS-2020-01-R: HCPCS

## 2021-09-28 NOTE — TELEPHONE ENCOUNTER
Orders placed for COVID-19 testing. Would like this done today in [place] if possible. Please call patient to schedule.    Margaret Aragon PA-C

## 2021-09-28 NOTE — TELEPHONE ENCOUNTER
Shayla calls and states the test needed to be ordered for her spouse George Mcclure MR#[id number]  Can you please add order to his chart please

## 2021-09-29 LAB — SARS-COV-2 RNA RESP QL NAA+PROBE: NEGATIVE

## 2021-10-10 ENCOUNTER — HEALTH MAINTENANCE LETTER (OUTPATIENT)
Age: 36
End: 2021-10-10

## 2021-10-11 ENCOUNTER — IMMUNIZATION (OUTPATIENT)
Dept: FAMILY MEDICINE | Facility: CLINIC | Age: 36
End: 2021-10-11
Payer: COMMERCIAL

## 2021-10-11 PROCEDURE — 90471 IMMUNIZATION ADMIN: CPT

## 2021-10-11 PROCEDURE — 90686 IIV4 VACC NO PRSV 0.5 ML IM: CPT

## 2021-11-22 ENCOUNTER — OFFICE VISIT (OUTPATIENT)
Dept: FAMILY MEDICINE | Facility: CLINIC | Age: 36
End: 2021-11-22
Payer: COMMERCIAL

## 2021-11-22 VITALS
SYSTOLIC BLOOD PRESSURE: 100 MMHG | HEIGHT: 60 IN | HEART RATE: 84 BPM | OXYGEN SATURATION: 99 % | RESPIRATION RATE: 10 BRPM | BODY MASS INDEX: 25.13 KG/M2 | WEIGHT: 128 LBS | DIASTOLIC BLOOD PRESSURE: 70 MMHG | TEMPERATURE: 98.2 F

## 2021-11-22 DIAGNOSIS — Z83.49 FAMILY HISTORY OF THYROIDITIS: ICD-10-CM

## 2021-11-22 DIAGNOSIS — Z00.00 ROUTINE GENERAL MEDICAL EXAMINATION AT A HEALTH CARE FACILITY: Primary | ICD-10-CM

## 2021-11-22 DIAGNOSIS — Z13.220 LIPID SCREENING: ICD-10-CM

## 2021-11-22 LAB
CHOLEST SERPL-MCNC: 190 MG/DL
FASTING STATUS PATIENT QL REPORTED: YES
HDLC SERPL-MCNC: 67 MG/DL
LDLC SERPL CALC-MCNC: 108 MG/DL
NONHDLC SERPL-MCNC: 123 MG/DL
T3 SERPL-MCNC: 95 NG/DL (ref 60–181)
T3FREE SERPL-MCNC: 2.6 PG/ML (ref 2.3–4.2)
TRIGL SERPL-MCNC: 74 MG/DL
TSH SERPL DL<=0.005 MIU/L-ACNC: 1.7 MU/L (ref 0.4–4)

## 2021-11-22 PROCEDURE — 84480 ASSAY TRIIODOTHYRONINE (T3): CPT | Performed by: PHYSICIAN ASSISTANT

## 2021-11-22 PROCEDURE — 36415 COLL VENOUS BLD VENIPUNCTURE: CPT | Performed by: PHYSICIAN ASSISTANT

## 2021-11-22 PROCEDURE — 86800 THYROGLOBULIN ANTIBODY: CPT | Performed by: PHYSICIAN ASSISTANT

## 2021-11-22 PROCEDURE — 86376 MICROSOMAL ANTIBODY EACH: CPT | Performed by: PHYSICIAN ASSISTANT

## 2021-11-22 PROCEDURE — 99395 PREV VISIT EST AGE 18-39: CPT | Performed by: PHYSICIAN ASSISTANT

## 2021-11-22 PROCEDURE — 84443 ASSAY THYROID STIM HORMONE: CPT | Performed by: PHYSICIAN ASSISTANT

## 2021-11-22 PROCEDURE — 80061 LIPID PANEL: CPT | Performed by: PHYSICIAN ASSISTANT

## 2021-11-22 PROCEDURE — 84432 ASSAY OF THYROGLOBULIN: CPT | Performed by: PHYSICIAN ASSISTANT

## 2021-11-22 ASSESSMENT — ENCOUNTER SYMPTOMS
HEADACHES: 1
FREQUENCY: 0
NERVOUS/ANXIOUS: 0
ABDOMINAL PAIN: 0
HEMATOCHEZIA: 0
CHILLS: 0
FEVER: 0
HEARTBURN: 0
EYE PAIN: 0
PARESTHESIAS: 0
PALPITATIONS: 0
CONSTIPATION: 0
DIARRHEA: 0
BREAST MASS: 0
ARTHRALGIAS: 0
DIZZINESS: 1
DYSURIA: 0
WEAKNESS: 0
COUGH: 0
SORE THROAT: 0
HEMATURIA: 0
JOINT SWELLING: 0
MYALGIAS: 0
SHORTNESS OF BREATH: 0
NAUSEA: 0

## 2021-11-22 ASSESSMENT — MIFFLIN-ST. JEOR: SCORE: 1185.85

## 2021-11-22 NOTE — PROGRESS NOTES
SUBJECTIVE:   CC: Shayla Mcclure is an 36 year old woman who presents for preventive health visit.       Patient has been advised of split billing requirements and indicates understanding: Yes  Healthy Habits:     Getting at least 3 servings of Calcium per day:  NO    Bi-annual eye exam:  NO    Dental care twice a year:  NO    Sleep apnea or symptoms of sleep apnea:  None    Diet:  Regular (no restrictions)    Frequency of exercise:  2-3 days/week    Duration of exercise:  15-30 minutes    Taking medications regularly:  Yes    Medication side effects:  None    PHQ-2 Total Score: 0    Additional concerns today:  Yes    Dizziness  Duration of complaint: Off and on over last year, back today.   Has not been sleeping well. Lots of stress. Does suspect that likely this is related to her body just crashing. Has been seen for this in the past with a normal work-up.       Today's PHQ-2 Score:   PHQ-2 ( 1999 [employer]) 11/22/2021   Q1: Little interest or pleasure in doing things 0   Q2: Feeling down, depressed or hopeless 0   PHQ-2 Score 0   PHQ-2 Total Score (12-17 Years)- Positive if 3 or more points; Administer PHQ-A if positive -   Q1: Little interest or pleasure in doing things Not at all   Q2: Feeling down, depressed or hopeless Not at all   PHQ-2 Score 0       Abuse: Current or Past (Physical, Sexual or Emotional) - No  Do you feel safe in your environment? Yes    Have you ever done Advance Care Planning? (For example, a Health Directive, POLST, or a discussion with a medical provider or your loved ones about your wishes): No, advance care planning information given to patient to review.  Patient declined advance care planning discussion at this time.    Social History     Tobacco Use     Smoking status: Never Smoker     Smokeless tobacco: Never Used   Substance Use Topics     Alcohol use: Yes     Comment: occasionally         Alcohol Use 11/22/2021   Prescreen: >3 drinks/day or >7 drinks/week? No       Reviewed orders  with patient.  Reviewed health maintenance and updated orders accordingly - Yes  BP Readings from Last 3 Encounters:   21 100/70   21 105/64   21 114/74    Wt Readings from Last 3 Encounters:   21 58.1 kg (128 lb)   21 57.6 kg (127 lb)   21 57.6 kg (127 lb)                  Patient Active Problem List   Diagnosis     Menorrhagia     Retained products of conception after miscarriage     Missed      Past Surgical History:   Procedure Laterality Date     C/SECTION, LOW TRANSVERSE      delivered at 27w3d for pre-eclampsia with severe features      COSMETIC MAMMOPLASTY AUGMENTATION BILATERAL      when 16 years old     DILATION AND CURETTAGE SUCTION N/A 2020    Procedure: DILATION AND CURETTAGE, UTERUS, USING SUCTION;  Surgeon: Haydee Mcpherson DO;  Location: [facility] OR     DILATION AND CURETTAGE SUCTION  2021    repeat procedure for retained POC after prior suction D&C 2021     DILATION AND CURETTAGE SUCTION N/A 2021    Procedure: DILATION AND CURETTAGE, UTERUS, USING SUCTION;  Surgeon: Haydee Mcpherson DO;  Location: [facility] OR     MYOMECTOMY UTERUS  2012    open myomectomy in [place], reported very large fibroids       Social History     Tobacco Use     Smoking status: Never Smoker     Smokeless tobacco: Never Used   Substance Use Topics     Alcohol use: Yes     Comment: occasionally     Family History   Problem Relation Age of Onset     Other - See Comments Mother         heavy menstrual cycles     Hashimoto's thyroiditis Mother      Lupus Father      Lupus Maternal Aunt      Leukemia Maternal Grandmother      Cancer No family hx of         Scooter's Syndrome         Current Outpatient Medications   Medication Sig Dispense Refill     Prenatal Vit-Fe Fumarate-FA (PRENATAL MULTIVITAMIN W/IRON) 27-0.8 MG tablet Take 1 tablet by mouth daily 90 tablet 3     Allergies   Allergen Reactions     Clindamycin        Breast Cancer Screening:  Any new diagnosis of family  "breast, ovarian, or bowel cancer? No    FHS-7: No flowsheet data found.  Patient under 40 years of age: Routine Mammogram Screening not recommended.   Pertinent mammograms are reviewed under the imaging tab.    History of abnormal Pap smear: NO - age 30-65 PAP every 5 years with negative HPV co-testing recommended  PAP / HPV Latest Ref Rng & Units 9/28/2018   PAP (Historical) - NIL   HPV16 NEG:Negative Negative   HPV18 NEG:Negative Negative   HRHPV NEG:Negative Negative     Reviewed and updated as needed this visit by clinical staff  Tobacco  Allergies  Meds  Problems  Med Hx  Surg Hx  Fam Hx         Reviewed and updated as needed this visit by Provider  Tobacco  Allergies  Meds  Problems  Med Hx  Surg Hx  Fam Hx            Review of Systems   Constitutional: Negative for chills and fever.   HENT: Negative for congestion, ear pain, hearing loss and sore throat.    Eyes: Negative for pain and visual disturbance.   Respiratory: Negative for cough and shortness of breath.    Cardiovascular: Negative for chest pain, palpitations and peripheral edema.   Gastrointestinal: Negative for abdominal pain, constipation, diarrhea, heartburn, hematochezia and nausea.   Breasts:  Positive for tenderness. Negative for breast mass and discharge.   Genitourinary: Negative for dysuria, frequency, genital sores, hematuria, urgency, vaginal bleeding and vaginal discharge.   Musculoskeletal: Negative for arthralgias, joint swelling and myalgias.   Skin: Negative for rash.   Neurological: Positive for dizziness and headaches. Negative for weakness and paresthesias.   Psychiatric/Behavioral: Negative for mood changes. The patient is not nervous/anxious.         OBJECTIVE:   /70   Pulse 84   Temp 98.2  F (36.8  C)   Resp 10   Ht 1.53 m (5' 0.24\")   Wt 58.1 kg (128 lb)   LMP 07/13/2021   SpO2 99%   BMI 24.80 kg/m    Physical Exam  GENERAL: healthy, alert and no distress  EYES: Eyes grossly normal to inspection, " "PERRL and conjunctivae and sclerae normal  HENT: ear canals and TM's normal, nose and mouth without ulcers or lesions  NECK: no adenopathy, no asymmetry, masses, or scars and thyroid normal to palpation  RESP: lungs clear to auscultation - no rales, rhonchi or wheezes  BREAST: normal without masses, tenderness or nipple discharge and no palpable axillary masses or adenopathy  CV: regular rate and rhythm, normal S1 S2, no S3 or S4, no murmur, click or rub, no peripheral edema and peripheral pulses strong  ABDOMEN: soft, nontender, no hepatosplenomegaly, no masses and bowel sounds normal  MS: no gross musculoskeletal defects noted, no edema  SKIN: no suspicious lesions or rashes  NEURO: Normal strength and tone, mentation intact and speech normal  PSYCH: mentation appears normal, affect normal/bright    Diagnostic Test Results:  Labs reviewed in Epic    ASSESSMENT/PLAN:   (Z00.00) Routine general medical examination at a health care facility  (primary encounter diagnosis)    (Z83.49) Family history of thyroiditis  Plan: TSH with free T4 reflex, T3, Free, T3, total,         Thyroid peroxidase antibody, Thyroglobulin and         Antibody Reflex    (Z13.220) Lipid screening  Plan: Lipid panel reflex to direct LDL Fasting      Patient has been advised of split billing requirements and indicates understanding: Yes  COUNSELING:  Reviewed preventive health counseling, as reflected in patient instructions    Estimated body mass index is 24.8 kg/m  as calculated from the following:    Height as of this encounter: 1.53 m (5' 0.24\").    Weight as of this encounter: 58.1 kg (128 lb).      She reports that she has never smoked. She has never used smokeless tobacco.      Counseling Resources:  ATP IV Guidelines  Pooled Cohorts Equation Calculator  Breast Cancer Risk Calculator  BRCA-Related Cancer Risk Assessment: FHS-7 Tool  FRAX Risk Assessment  ICSI Preventive Guidelines  Dietary Guidelines for Americans, 2010  USDA's " MyPlate  ASA Prophylaxis  Lung CA Screening    Margaret Aragon PA-C  [facility]

## 2021-11-23 LAB
THYROGLOB AB SERPL IA-ACNC: <20 IU/ML
THYROGLOB SERPL-MCNC: 47.1 NG/ML
THYROPEROXIDASE AB SERPL-ACNC: <10 IU/ML

## 2022-04-26 ENCOUNTER — LAB (OUTPATIENT)
Dept: LAB | Facility: CLINIC | Age: 37
End: 2022-04-26

## 2022-04-26 ENCOUNTER — OFFICE VISIT (OUTPATIENT)
Dept: DERMATOLOGY | Facility: CLINIC | Age: 37
End: 2022-04-26
Payer: COMMERCIAL

## 2022-04-26 DIAGNOSIS — L70.0 ACNE VULGARIS: ICD-10-CM

## 2022-04-26 DIAGNOSIS — L50.8 CHRONIC URTICARIA: ICD-10-CM

## 2022-04-26 DIAGNOSIS — L50.8 CHRONIC URTICARIA: Primary | ICD-10-CM

## 2022-04-26 LAB
ALBUMIN SERPL-MCNC: 4 G/DL (ref 3.4–5)
ALP SERPL-CCNC: 54 U/L (ref 40–150)
ALT SERPL W P-5'-P-CCNC: 50 U/L (ref 0–50)
ANION GAP SERPL CALCULATED.3IONS-SCNC: 5 MMOL/L (ref 3–14)
AST SERPL W P-5'-P-CCNC: 19 U/L (ref 0–45)
BASOPHILS # BLD AUTO: 0.1 10E3/UL (ref 0–0.2)
BASOPHILS NFR BLD AUTO: 1 %
BILIRUB SERPL-MCNC: 0.3 MG/DL (ref 0.2–1.3)
BUN SERPL-MCNC: 18 MG/DL (ref 7–30)
CALCIUM SERPL-MCNC: 9 MG/DL (ref 8.5–10.1)
CHLORIDE BLD-SCNC: 108 MMOL/L (ref 94–109)
CO2 SERPL-SCNC: 27 MMOL/L (ref 20–32)
CREAT SERPL-MCNC: 0.79 MG/DL (ref 0.52–1.04)
EOSINOPHIL # BLD AUTO: 0.2 10E3/UL (ref 0–0.7)
EOSINOPHIL NFR BLD AUTO: 3 %
ERYTHROCYTE [DISTWIDTH] IN BLOOD BY AUTOMATED COUNT: 12.6 % (ref 10–15)
GFR SERPL CREATININE-BSD FRML MDRD: >90 ML/MIN/1.73M2
GLUCOSE BLD-MCNC: 94 MG/DL (ref 70–99)
HBV CORE AB SERPL QL IA: NONREACTIVE
HBV SURFACE AB SERPL IA-ACNC: 0 M[IU]/ML
HBV SURFACE AG SERPL QL IA: NONREACTIVE
HCT VFR BLD AUTO: 41.8 % (ref 35–47)
HCV AB SERPL QL IA: NONREACTIVE
HGB BLD-MCNC: 13.8 G/DL (ref 11.7–15.7)
IMM GRANULOCYTES # BLD: 0 10E3/UL
IMM GRANULOCYTES NFR BLD: 0 %
LYMPHOCYTES # BLD AUTO: 3 10E3/UL (ref 0.8–5.3)
LYMPHOCYTES NFR BLD AUTO: 39 %
MCH RBC QN AUTO: 29.2 PG (ref 26.5–33)
MCHC RBC AUTO-ENTMCNC: 33 G/DL (ref 31.5–36.5)
MCV RBC AUTO: 88 FL (ref 78–100)
MONOCYTES # BLD AUTO: 0.4 10E3/UL (ref 0–1.3)
MONOCYTES NFR BLD AUTO: 5 %
NEUTROPHILS # BLD AUTO: 4.1 10E3/UL (ref 1.6–8.3)
NEUTROPHILS NFR BLD AUTO: 52 %
NRBC # BLD AUTO: 0 10E3/UL
NRBC BLD AUTO-RTO: 0 /100
PLATELET # BLD AUTO: 346 10E3/UL (ref 150–450)
POTASSIUM BLD-SCNC: 4.2 MMOL/L (ref 3.4–5.3)
PROT SERPL-MCNC: 7.7 G/DL (ref 6.8–8.8)
RBC # BLD AUTO: 4.73 10E6/UL (ref 3.8–5.2)
SODIUM SERPL-SCNC: 140 MMOL/L (ref 133–144)
TSH SERPL DL<=0.005 MIU/L-ACNC: 1.67 MU/L (ref 0.4–4)
WBC # BLD AUTO: 7.8 10E3/UL (ref 4–11)

## 2022-04-26 PROCEDURE — 86038 ANTINUCLEAR ANTIBODIES: CPT | Mod: 90 | Performed by: PATHOLOGY

## 2022-04-26 PROCEDURE — 99204 OFFICE O/P NEW MOD 45 MIN: CPT | Performed by: DERMATOLOGY

## 2022-04-26 PROCEDURE — 36415 COLL VENOUS BLD VENIPUNCTURE: CPT | Performed by: PATHOLOGY

## 2022-04-26 PROCEDURE — 86803 HEPATITIS C AB TEST: CPT | Mod: 90 | Performed by: PATHOLOGY

## 2022-04-26 PROCEDURE — 87340 HEPATITIS B SURFACE AG IA: CPT | Mod: 90 | Performed by: PATHOLOGY

## 2022-04-26 PROCEDURE — 99000 SPECIMEN HANDLING OFFICE-LAB: CPT | Performed by: PATHOLOGY

## 2022-04-26 PROCEDURE — 86376 MICROSOMAL ANTIBODY EACH: CPT | Mod: 90 | Performed by: PATHOLOGY

## 2022-04-26 PROCEDURE — 86706 HEP B SURFACE ANTIBODY: CPT | Mod: 90 | Performed by: PATHOLOGY

## 2022-04-26 PROCEDURE — 80050 GENERAL HEALTH PANEL: CPT | Performed by: PATHOLOGY

## 2022-04-26 PROCEDURE — 86704 HEP B CORE ANTIBODY TOTAL: CPT | Mod: 90 | Performed by: PATHOLOGY

## 2022-04-26 RX ORDER — CETIRIZINE HYDROCHLORIDE 10 MG/1
10 TABLET ORAL DAILY
Qty: 90 TABLET | Refills: 3 | Status: SHIPPED | OUTPATIENT
Start: 2022-04-26 | End: 2024-07-18

## 2022-04-26 RX ORDER — TRIAMCINOLONE ACETONIDE 1 MG/G
OINTMENT TOPICAL
Qty: 80 G | Refills: 11 | Status: SHIPPED | OUTPATIENT
Start: 2022-04-26 | End: 2022-04-26

## 2022-04-26 RX ORDER — HYDROXYZINE HYDROCHLORIDE 10 MG/1
10 TABLET, FILM COATED ORAL
Qty: 30 TABLET | Refills: 11 | Status: SHIPPED | OUTPATIENT
Start: 2022-04-26 | End: 2024-07-18

## 2022-04-26 RX ORDER — ADAPALENE 0.1 G/100G
CREAM TOPICAL
Qty: 45 G | Refills: 11 | Status: SHIPPED | OUTPATIENT
Start: 2022-04-26 | End: 2022-04-26

## 2022-04-26 RX ORDER — TRIAMCINOLONE ACETONIDE 1 MG/G
OINTMENT TOPICAL
Qty: 80 G | Refills: 11 | Status: SHIPPED | OUTPATIENT
Start: 2022-04-26 | End: 2024-07-18

## 2022-04-26 RX ORDER — CETIRIZINE HYDROCHLORIDE 10 MG/1
10 TABLET ORAL DAILY
Qty: 90 TABLET | Refills: 3 | Status: SHIPPED | OUTPATIENT
Start: 2022-04-26 | End: 2022-04-26

## 2022-04-26 RX ORDER — ADAPALENE 0.1 G/100G
CREAM TOPICAL
Qty: 45 G | Refills: 11 | Status: SHIPPED | OUTPATIENT
Start: 2022-04-26 | End: 2024-07-18

## 2022-04-26 RX ORDER — HYDROXYZINE HYDROCHLORIDE 10 MG/1
10 TABLET, FILM COATED ORAL
Qty: 30 TABLET | Refills: 11 | Status: SHIPPED | OUTPATIENT
Start: 2022-04-26 | End: 2022-04-26

## 2022-04-26 ASSESSMENT — PAIN SCALES - GENERAL: PAINLEVEL: NO PAIN (0)

## 2022-04-26 NOTE — PROGRESS NOTES
[facility] Dermatology Note  Encounter Date: 2022  Office Visit     Dermatology Problem List:  1. ***    ____________________________________________    Assessment & Plan:    # {Diagnosesderm:215606}.   {kkplans:925968}   - ***     # {Diagnosesderm:306584}.   {kkplans:451086}   - ***     Procedures Performed:   {kkprocedurenotes:926053}  {kkprocedurenotes:435733}    Follow-up: {kkfollowup:085869}    Staff and Scribe:     Scribe Disclosure:  I, Martita Harman, am serving as a scribe to document services personally performed by Tex Olivia MD based on data collection and the provider's statements to me.     ***  ____________________________________________    CC: No chief complaint on file.    HPI:  Ms. Shayla Mcclure is a(n) 37 year old female who presents today as a new patient for ***    Patient is otherwise feeling well, without additional skin concerns.    Labs Reviewed:  ***N/A    Physical Exam:  Vitals: LMP 2021   SKIN: {kkSkinExam:220372}  - ***  - {Skin Exam Derm:531302}.   - {Skin Exam Derm:974936}.   - {Skin Exam Derm:630144}.   - No other lesions of concern on areas examined.     Medications:  Current Outpatient Medications   Medication     Prenatal Vit-Fe Fumarate-FA (PRENATAL MULTIVITAMIN W/IRON) 27-0.8 MG tablet     No current facility-administered medications for this visit.      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

## 2022-04-26 NOTE — LETTER
4/26/2022       RE: Shayla Mcclure  [address] [facility] [address]     Dear Colleague,    Thank you for referring your patient, Shayla Mcclure, to the [facility] [place] [facility]. Please see a copy of my visit note below.    [facility] Dermatology Note  Encounter Date: Apr 26, 2022  Office Visit     Dermatology Problem List:  1. Chronic Urticaria, since 2015  2. Acne Vulgaris    ____________________________________________    Assessment & Plan:    # Chronic urticaria. Chronic, flare/not at goal.   - Discussed possible causes of this condition with the patient. We agreed on a plan for her to make an appointment for prick testing with Dr. Jonse in Allergy to investigate for environmental causes and to obtain lab workup to investigate for underlying medical causes.   - Labs Ordered: Hepatitis B/C panel, TSH, TPO Ab, SAHRA, CMP, CBC  - Start Cetirizine 10mg 1 tablet daily in the morning  - Start Hydroxyzine 10mg 1 tablet daily at night as needed for discomfort related to urticaria  - Start triamcinolone 0.1% external ointment up to twice daily as needed for itching  - Referral for prick testing    # Acne vulgaris. Chronic, flare/not at goal.   - Start Adapalene 0.1% external cream at night to the face, starting every other day and increasing to every day as tolerated  - Stop OTC retinol  - May continue other OTC acne and face products like salicylic acid, niacinamide, and Vitamin C serums as desired  - Sun protection: Counseled SPF30+ sunscreen, sun avoidance    Procedures Performed:   N/A    Follow-up: 3 month(s) in-person, or earlier for new or changing lesions    Staff and Scribe:     IMarquis, am serving as a scribe to document services personally performed by Tex Olivia MD based on data collection and the provider's statements to me.     Provider Disclosure:   The documentation recorded by the scribe  "accurately reflects the services I personally performed and the decisions made by me.    Tex Olivia MD    Department of Dermatology  [facility]: Phone: [phone], Fax:[phone]  [facility]: Phone: [phone] Fax: [phone]  ____________________________________________    CC: Derm Problem (Shayla is here today for \"intermittent hives with itching\" since 2015, does not have any today)    HPI:  Ms. Shayla Mcclure is a(n) 37 year old female who presents today as a new patient for intermittent hives with itching. The patient states that her hives started in 2015 on her feet and fingers while she was pregnant. She states that she slowly began developing hives elsewhere on her body until a peak around 2019 when she would develop extremely itchy and uncomfortable hives on multiple areas of the body every day, but clarifies that she never gets hives on her face or head. She notes that the hives disappear within a few hours and do not seem to be associated with any food or other environmental triggers. She denies any family members or close contacts with similar symptoms. She does note that the hives seem to get worse during times of high stress. She denies any history of seasonal allergies, asthma, or eczema. She has no personal history of medical disease. Family history includes lupus in her father and Hashimoto's thyroiditis in her mother and maternal first cousin.     She also complains of intermittent acne flares that are associated with her menstrual cycle. She notes that she uses several over-the-counter products including topical retinol, niacinamide, Vitamin C, and salicylic acid. She is concerned about the hyperpigmentation that occurs as a result of acne lesions and would like something prescription strength for better control of acne outbreaks.    Patient is otherwise feeling " well, without additional skin concerns.    Labs Reviewed:  Hep C Ab  Hep B Core Ag  Hep B Surface Ag  Hep B Surface Ab  TSH  TPO Ab  SAHRA  CMP  CBC    Physical Exam:  Vitals:   SKIN: Acne exam, which includes the face, neck, upper central chest, and upper central back was performed. Additional examination of the hands and digits was performed  - There are some red or hyperpigmented macules and papules on the upper lip and scattered across the face.   - Per patient photography: several indurated papules and plaques diffusely scattered across the body, sparing the face.  - No other lesions of concern on areas examined.     Medications:  Current Outpatient Medications   Medication     Prenatal Vit-Fe Fumarate-FA (PRENATAL MULTIVITAMIN W/IRON) 27-0.8 MG tablet     No current facility-administered medications for this visit.      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

## 2022-04-26 NOTE — PROGRESS NOTES
[facility] Dermatology Note  Encounter Date: Apr 26, 2022  Office Visit     Dermatology Problem List:  1. Chronic Urticaria, since 2015  2. Acne Vulgaris    ____________________________________________    Assessment & Plan:    # Chronic urticaria. Chronic, flare/not at goal.   - Discussed possible causes of this condition with the patient. We agreed on a plan for her to make an appointment for prick testing with Dr. Jones in Allergy to investigate for environmental causes and to obtain lab workup to investigate for underlying medical causes.   - Labs Ordered: Hepatitis B/C panel, TSH, TPO Ab, SAHRA, CMP, CBC  - Start Cetirizine 10mg 1 tablet daily in the morning  - Start Hydroxyzine 10mg 1 tablet daily at night as needed for discomfort related to urticaria  - Start triamcinolone 0.1% external ointment up to twice daily as needed for itching  - Referral for prick testing    # Acne vulgaris. Chronic, flare/not at goal.   - Start Adapalene 0.1% external cream at night to the face, starting every other day and increasing to every day as tolerated  - Stop OTC retinol  - May continue other OTC acne and face products like salicylic acid, niacinamide, and Vitamin C serums as desired  - Sun protection: Counseled SPF30+ sunscreen, sun avoidance    Procedures Performed:   N/A    Follow-up: 3 month(s) in-person, or earlier for new or changing lesions    Staff and Scribe:     I,Marquis Hinkle, am serving as a scribe to document services personally performed by Tex Olivia MD based on data collection and the provider's statements to me.     Provider Disclosure:   The documentation recorded by the scribe accurately reflects the services I personally performed and the decisions made by me.    Tex Olivia MD    Department of Dermatology  [facility]: Phone: [phone], Fax:[phone]  [facility]  "[facility]: Phone: [phone] Fax: [phone]  ____________________________________________    CC: Derm Problem (Shayla is here today for \"intermittent hives with itching\" since 2015, does not have any today)    HPI:  Ms. Shayla Mcclure is a(n) 37 year old female who presents today as a new patient for intermittent hives with itching. The patient states that her hives started in 2015 on her feet and fingers while she was pregnant. She states that she slowly began developing hives elsewhere on her body until a peak around 2019 when she would develop extremely itchy and uncomfortable hives on multiple areas of the body every day, but clarifies that she never gets hives on her face or head. She notes that the hives disappear within a few hours and do not seem to be associated with any food or other environmental triggers. She denies any family members or close contacts with similar symptoms. She does note that the hives seem to get worse during times of high stress. She denies any history of seasonal allergies, asthma, or eczema. She has no personal history of medical disease. Family history includes lupus in her father and Hashimoto's thyroiditis in her mother and maternal first cousin.     She also complains of intermittent acne flares that are associated with her menstrual cycle. She notes that she uses several over-the-counter products including topical retinol, niacinamide, Vitamin C, and salicylic acid. She is concerned about the hyperpigmentation that occurs as a result of acne lesions and would like something prescription strength for better control of acne outbreaks.    Patient is otherwise feeling well, without additional skin concerns.    Labs Reviewed:  Hep C Ab  Hep B Core Ag  Hep B Surface Ag  Hep B Surface Ab  TSH  TPO Ab  SAHRA  CMP  CBC    Physical Exam:  Vitals:   SKIN: Acne exam, which includes the face, neck, upper central chest, and upper central back was performed. Additional " examination of the hands and digits was performed  - There are some red or hyperpigmented macules and papules on the upper lip and scattered across the face.   - Per patient photography: several indurated papules and plaques diffusely scattered across the body, sparing the face.  - No other lesions of concern on areas examined.     Medications:  Current Outpatient Medications   Medication     Prenatal Vit-Fe Fumarate-FA (PRENATAL MULTIVITAMIN W/IRON) 27-0.8 MG tablet     No current facility-administered medications for this visit.      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

## 2022-04-26 NOTE — PATIENT INSTRUCTIONS
Start taking 1 Cetirizine (Zyrtec)10 mg tablet in the mornings  Start taking 1 Hydroxyzine tablets as needed for hives at night. This can cause drowsiness.   You can use the triamcinolone cream as needed for itching.    Make an appointment for prick testing with Dr. Jones in the Allergy Clinic    Make an appointment to return to clinic for follow in 3 months    Replace your current retinol with Adapalene cream. Use a pea-sized amount every other day. Can increase to everyday as needed and tolerated.    Stop by the lab for blood work today.

## 2022-04-26 NOTE — NURSING NOTE
"Dermatology Rooming Note    Shayla Mcclure's goals for this visit include:   Chief Complaint   Patient presents with     Derm Problem     Shayla is here today for \"intermittent hives with itching\" since 2015, does not have any today     Lucille Graves, EMT    "

## 2022-04-27 LAB
ANA SER QL IF: NEGATIVE
THYROPEROXIDASE AB SERPL-ACNC: <10 IU/ML

## 2022-05-03 ENCOUNTER — TELEPHONE (OUTPATIENT)
Dept: OBGYN | Facility: CLINIC | Age: 37
End: 2022-05-03
Payer: COMMERCIAL

## 2022-05-10 DIAGNOSIS — L70.0 ACNE VULGARIS: Primary | ICD-10-CM

## 2022-05-10 RX ORDER — TRETINOIN 0.25 MG/G
CREAM TOPICAL
Qty: 45 G | Refills: 11 | Status: SHIPPED | OUTPATIENT
Start: 2022-05-10 | End: 2024-07-25

## 2022-06-17 NOTE — TELEPHONE ENCOUNTER
FUTURE VISIT INFORMATION      FUTURE VISIT INFORMATION:    Date: 8.30.22    Time: 12:15    Location: [facility]  REFERRAL INFORMATION:    Referring provider:  Ne    Referring providers clinic:  Derm    Reason for visit/diagnosis  Consult Per Dr. Olivia    RECORDS REQUESTED FROM:       Clinic name Comments Records Status Imaging Status   Derm 4.26.22  [facility] Epic Epic

## 2022-07-05 ENCOUNTER — TELEPHONE (OUTPATIENT)
Dept: DERMATOLOGY | Facility: CLINIC | Age: 37
End: 2022-07-05

## 2022-08-30 ENCOUNTER — PRE VISIT (OUTPATIENT)
Dept: ALLERGY | Facility: CLINIC | Age: 37
End: 2022-08-30
Payer: COMMERCIAL

## 2022-09-18 ENCOUNTER — HEALTH MAINTENANCE LETTER (OUTPATIENT)
Age: 37
End: 2022-09-18

## 2023-01-28 ENCOUNTER — HEALTH MAINTENANCE LETTER (OUTPATIENT)
Age: 38
End: 2023-01-28

## 2023-10-10 ENCOUNTER — MYC MEDICAL ADVICE (OUTPATIENT)
Dept: OBGYN | Facility: CLINIC | Age: 38
End: 2023-10-10
Payer: COMMERCIAL

## 2023-10-16 NOTE — TELEPHONE ENCOUNTER
DIAGNOSIS: right wrist pain \ medicaid\ ortho con    APPOINTMENT DATE: 10/31/23   NOTES STATUS DETAILS   OFFICE NOTE from referring provider N/A Self   MEDICATION LIST Internal

## 2023-10-31 ENCOUNTER — PRE VISIT (OUTPATIENT)
Dept: ORTHOPEDICS | Facility: CLINIC | Age: 38
End: 2023-10-31

## 2023-11-14 ENCOUNTER — OFFICE VISIT (OUTPATIENT)
Dept: ORTHOPEDICS | Facility: CLINIC | Age: 38
End: 2023-11-14
Payer: COMMERCIAL

## 2023-11-14 DIAGNOSIS — G89.29 CHRONIC PAIN OF RIGHT WRIST: Primary | ICD-10-CM

## 2023-11-14 DIAGNOSIS — M25.531 CHRONIC PAIN OF RIGHT WRIST: Primary | ICD-10-CM

## 2023-11-14 PROCEDURE — 99204 OFFICE O/P NEW MOD 45 MIN: CPT | Performed by: STUDENT IN AN ORGANIZED HEALTH CARE EDUCATION/TRAINING PROGRAM

## 2023-11-14 RX ORDER — DICLOFENAC SODIUM 75 MG/1
TABLET, DELAYED RELEASE ORAL
Qty: 60 TABLET | Refills: 0 | Status: SHIPPED | OUTPATIENT
Start: 2023-11-14 | End: 2024-03-16

## 2023-11-14 NOTE — PROGRESS NOTES
DME FITTING    Relevant Diagnosis: Right wrist injury  Right wrist brace was fit on patient's Right wrist.     Person(s) involved in teaching:   Patient    Brace was applied in standard Manner:  Yes  Brace fit well:  Yes  Patient reports brace to fit comfortably:  Yes    Education:   Patient shown self application and removal of brace: Yes  Patient shown how to adjust brace fit, if necessary: Yes  Patient educated on billing and return policy: Yes  Patient confirmed understanding when and how to contact clinic with concerns: Yes      Carlos Duarte M.A., LAT, ATC  Certified Athletic Trainer

## 2023-11-14 NOTE — PROGRESS NOTES
Sports Medicine Clinic           ASSESSMENT and PLAN:     Shayla was seen today for pain.    Diagnoses and all orders for this visit:    Chronic pain of right wrist  Localizes primarily to extensor tendons and the volar wrist. Although she has a history of wrist fracture with intermittent wrist pain ever since she has only had persistent pain for the past 10 months since the birth of her son. She hasn't tried anything for it and outside images negative for fracture. 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 We discussed treatment options and will start with bracing to rest the wrist with consistent NSAIDs and start hand therapy.   -     diclofenac (VOLTAREN) 75 MG EC tablet; Take one tab by mouth twice daily for two weeks.  -     Hand Therapy Referral; Future  -     Wrist/Arm Supplies Order Wrist Brace; Right; non-thumb spica        -     follow up in 6-8 weeks, if no improvement plan for MRI of the right wrist    Return sooner if develops new or worsening symptoms.    Options for treatment and/or follow-up care were reviewed with the patient was actively involved in the decision making process. Patient verbalized understanding and was in agreement with the plan.    Danielle Soliz MD, CAM  Primary Care Sports Medicine           SUBJECTIVE       Shayla Mcclure is a 38 year old female presenting to clinic today with a chief complaint of Right wrist pain, self referral.    Onset: 7 month(s) ago. Reports insidious onset without acute precipitating event.  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 When she was 8 she remembers she had a wrist fracture. Her father cut off her cast after two weeks and she doesn't think she was ever seen for it. She had had chronic but intermittent wrist pain ever since.     Since she had her second child in April it has been worsening. Her pain is in the center of her wrist on the dorsal side.     She does a lot of work with her older daughter as well who has special needs. She has a lot of pain when she carries laundry.     When she was seen in August she was given a brace but it didn't fit. She was referred to hand therapy but they moved up to the [facility] so she didn't do it. She will occasioanlly take tyleno.       PMH, Medications and Allergies were reviewed and updated as needed.    ROS:  As noted above otherwise negative.    Patient Active Problem List   Diagnosis    Menorrhagia    Retained products of conception after miscarriage    Missed        Current Outpatient Medications   Medication Sig Dispense Refill    diclofenac (VOLTAREN) 75 MG EC tablet Take one tab by mouth twice daily for two weeks. 60 tablet 0    adapalene (DIFFERIN) 0.1 % external cream Apply a pea-sized amount evenly over the face at nighttime before bed. 45 g 11    cetirizine (ZYRTEC) 10 MG tablet Take 1 tablet (10 mg) by mouth daily 90 tablet 3    hydrOXYzine (ATARAX) 10 MG tablet Take 1 tablet (10 mg) by mouth nightly as needed for itching 30 tablet 11    Prenatal Vit-Fe Fumarate-FA (PRENATAL MULTIVITAMIN W/IRON) 27-0.8 MG tablet Take 1 tablet by mouth daily (Patient not taking: Reported on 2022) 90 tablet 3    tretinoin (RETIN-A) 0.025 % external cream Apply a pea-sized amount evenly over face at nighttime before bed 45 g 11    triamcinolone (KENALOG) 0.1 % external ointment Apply twice daily as needed for hives 80 g 11             OBJECTIVE:       Vitals: There were no vitals filed for this visit.  BMI: There is no height or weight on file to calculate BMI.    Gen:  Well nourished and in no acute distress  HEENT: Extraocular movement intact  Neck: Supple  Pulm:  Breathing Comfortably. No increased respiratory effort.  Psych: Euthymic. Appropriately answers questions    MSK:   RIGHT WRIST  Inspection:    No swelling, bruising, discoloration, or obvious deformity or asymmetry  Palpation:    Tender about the extensor tendons and DRUJ. Remainder of bony and ligamentous line marks are nontender.  Range of Motion:    Full (active and passive) flexion, extension, pronation/supination, and ulnar/radial deviation.  Strength:    No deficits in flexion, extension, ulnar/radial deviation, or  strength.  Special Tests:    Positive:     Negative: Finkelstein's, scaphoid shift test    Outside images not available for personal review however report as below:  IMPRESSION:   WRIST COMPLETE RIGHT (MIN 3 VIEWS)     No perceptible fracture.  No dislocation or lytic or blastic lesion.  No   joint space abnormality.  No erosion or soft tissue calcification.  If   there is a clinical need for additional imaging, MRI could be considered.

## 2023-11-14 NOTE — LETTER
11/14/2023      RE: Shayla Mcclure  [address] [facility] [address]     Dear Colleague,    Thank you for referring your patient, Shayla Mcclure, to the [facility] [place]. Please see a copy of my visit note below.    Sports Medicine Clinic           ASSESSMENT and PLAN:     Shayla was seen today for pain.    Diagnoses and all orders for this visit:    Chronic pain of right wrist  Localizes primarily to extensor tendons and the volar wrist. Although she has a history of wrist fracture with intermittent wrist pain ever since she has only had persistent pain for the past 10 months since the birth of her son. She hasn't tried anything for it and outside images negative for fracture. 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 We discussed treatment options and will start with bracing to rest the wrist with consistent NSAIDs and start hand therapy.   -     diclofenac (VOLTAREN) 75 MG EC tablet; Take one tab by mouth twice daily for two weeks.  -     Hand Therapy Referral; Future  -     Wrist/Arm Supplies Order Wrist Brace; Right; non-thumb spica        -     follow up in 6-8 weeks, if no improvement plan for MRI of the right wrist    Return sooner if develops new or worsening symptoms.    Options for treatment and/or follow-up care were reviewed with the patient was actively involved in the decision making process. Patient verbalized understanding and was in agreement with the plan.    Danielle Soliz MD, [facility]  Primary Care Sports Medicine           SUBJECTIVE       Shayla Mcclure is a 38 year old female presenting to clinic today with a chief complaint of Right wrist pain, self referral.    Onset: 7 month(s) ago. Reports insidious onset without acute precipitating event.  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 When she was 8 she remembers she had a wrist fracture. Her father cut off her cast after two weeks and she doesn't think she was ever seen for it. She had had chronic but intermittent wrist pain ever since.     Since she had her second child in April it has been worsening. Her pain is in the center of her wrist on the dorsal side.     She does a lot of work with her older daughter as well who has special needs. She has a lot of pain when she carries laundry.     When she was seen in August she was given a brace but it didn't fit. She was referred to hand therapy but they moved up to the [facility] so she didn't do it. She will occasioanlly take tyleno.       PMH, Medications and Allergies were reviewed and updated as needed.    ROS:  As noted above otherwise negative.    Patient Active Problem List   Diagnosis     Menorrhagia     Retained products of conception after miscarriage     Missed        Current Outpatient Medications   Medication Sig Dispense Refill     diclofenac (VOLTAREN) 75 MG EC tablet Take one tab by mouth twice daily for two weeks. 60 tablet 0     adapalene (DIFFERIN) 0.1 % external cream Apply a pea-sized amount evenly over the face at nighttime before bed. 45 g 11     cetirizine (ZYRTEC) 10 MG tablet Take 1 tablet (10 mg) by mouth daily 90 tablet 3     hydrOXYzine (ATARAX) 10 MG tablet Take 1 tablet (10 mg) by mouth nightly as needed for itching 30 tablet 11     Prenatal Vit-Fe Fumarate-FA (PRENATAL MULTIVITAMIN W/IRON) 27-0.8 MG tablet Take 1 tablet by mouth daily (Patient not taking: Reported on  4/26/2022) 90 tablet 3     tretinoin (RETIN-A) 0.025 % external cream Apply a pea-sized amount evenly over face at nighttime before bed 45 g 11     triamcinolone (KENALOG) 0.1 % external ointment Apply twice daily as needed for hives 80 g 11            OBJECTIVE:       Vitals: There were no vitals filed for this visit.  BMI: There is no height or weight on file to calculate BMI.    Gen:  Well nourished and in no acute distress  HEENT: Extraocular movement intact  Neck: Supple  Pulm:  Breathing Comfortably. No increased respiratory effort.  Psych: Euthymic. Appropriately answers questions    MSK:   RIGHT WRIST  Inspection:    No swelling, bruising, discoloration, or obvious deformity or asymmetry  Palpation:    Tender about the extensor tendons and DRUJ. Remainder of bony and ligamentous line marks are nontender.  Range of Motion:    Full (active and passive) flexion, extension, pronation/supination, and ulnar/radial deviation.  Strength:    No deficits in flexion, extension, ulnar/radial deviation, or  strength.  Special Tests:    Positive:     Negative: Finkelstein's, scaphoid shift test    Outside images not available for personal review however report as below:  IMPRESSION:   WRIST COMPLETE RIGHT (MIN 3 VIEWS)     No perceptible fracture.  No dislocation or lytic or blastic lesion.  No   joint space abnormality.  No erosion or soft tissue calcification.  If   there is a clinical need for additional imaging, MRI could be considered.       DME FITTING    Relevant Diagnosis: Right wrist injury  Right wrist brace was fit on patient's Right wrist.     Person(s) involved in teaching:   Patient    Brace was applied in standard Manner:  Yes  Brace fit well:  Yes  Patient reports brace to fit comfortably:  Yes    Education:   Patient shown self application and removal of brace: Yes  Patient shown how to adjust brace fit, if necessary: Yes  Patient educated on billing and return policy: Yes  Patient confirmed  understanding when and how to contact clinic with concerns: Yes      Carlos Duarte M.A., LAT, ATC  Certified Athletic Trainer        Again, thank you for allowing me to participate in the care of your patient.      Sincerely,    Danielle Soliz MD

## 2023-11-15 ENCOUNTER — LAB (OUTPATIENT)
Dept: LAB | Facility: CLINIC | Age: 38
End: 2023-11-15
Attending: OBSTETRICS & GYNECOLOGY
Payer: COMMERCIAL

## 2023-11-15 ENCOUNTER — ANCILLARY PROCEDURE (OUTPATIENT)
Dept: MAMMOGRAPHY | Facility: CLINIC | Age: 38
End: 2023-11-15
Attending: OBSTETRICS & GYNECOLOGY
Payer: COMMERCIAL

## 2023-11-15 ENCOUNTER — OFFICE VISIT (OUTPATIENT)
Dept: OBGYN | Facility: CLINIC | Age: 38
End: 2023-11-15
Attending: OBSTETRICS & GYNECOLOGY
Payer: COMMERCIAL

## 2023-11-15 VITALS
BODY MASS INDEX: 27.52 KG/M2 | SYSTOLIC BLOOD PRESSURE: 102 MMHG | HEART RATE: 84 BPM | DIASTOLIC BLOOD PRESSURE: 68 MMHG | WEIGHT: 142 LBS

## 2023-11-15 DIAGNOSIS — Z12.4 SCREENING FOR CERVICAL CANCER: ICD-10-CM

## 2023-11-15 DIAGNOSIS — Z12.31 ENCOUNTER FOR SCREENING MAMMOGRAM FOR BREAST CANCER: ICD-10-CM

## 2023-11-15 DIAGNOSIS — Z83.49 FAMILY HISTORY OF THYROID DISEASE: Primary | ICD-10-CM

## 2023-11-15 DIAGNOSIS — Z83.49 FAMILY HISTORY OF THYROID DISEASE: ICD-10-CM

## 2023-11-15 PROBLEM — O02.1 MISSED ABORTION: Status: RESOLVED | Noted: 2021-09-09 | Resolved: 2023-11-15

## 2023-11-15 PROBLEM — O03.4 RETAINED PRODUCTS OF CONCEPTION AFTER MISCARRIAGE: Status: RESOLVED | Noted: 2020-04-09 | Resolved: 2023-11-15

## 2023-11-15 PROBLEM — D25.1 INTRAMURAL LEIOMYOMA OF UTERUS: Status: ACTIVE | Noted: 2022-10-19

## 2023-11-15 PROBLEM — N92.0 MENORRHAGIA: Status: RESOLVED | Noted: 2018-10-03 | Resolved: 2023-11-15

## 2023-11-15 LAB — TSH SERPL DL<=0.005 MIU/L-ACNC: 1.39 UIU/ML (ref 0.3–4.2)

## 2023-11-15 PROCEDURE — 77067 SCR MAMMO BI INCL CAD: CPT

## 2023-11-15 PROCEDURE — 84443 ASSAY THYROID STIM HORMONE: CPT

## 2023-11-15 PROCEDURE — 77067 SCR MAMMO BI INCL CAD: CPT | Mod: 26 | Performed by: RADIOLOGY

## 2023-11-15 PROCEDURE — G0145 SCR C/V CYTO,THINLAYER,RESCR: HCPCS | Performed by: OBSTETRICS & GYNECOLOGY

## 2023-11-15 PROCEDURE — 36415 COLL VENOUS BLD VENIPUNCTURE: CPT

## 2023-11-15 PROCEDURE — 87624 HPV HI-RISK TYP POOLED RSLT: CPT | Performed by: OBSTETRICS & GYNECOLOGY

## 2023-11-15 PROCEDURE — 99213 OFFICE O/P EST LOW 20 MIN: CPT | Mod: 25 | Performed by: OBSTETRICS & GYNECOLOGY

## 2023-11-15 PROCEDURE — 99396 PREV VISIT EST AGE 40-64: CPT | Performed by: OBSTETRICS & GYNECOLOGY

## 2023-11-15 ASSESSMENT — ANXIETY QUESTIONNAIRES
4. TROUBLE RELAXING: NOT AT ALL
1. FEELING NERVOUS, ANXIOUS, OR ON EDGE: NOT AT ALL
6. BECOMING EASILY ANNOYED OR IRRITABLE: NOT AT ALL
IF YOU CHECKED OFF ANY PROBLEMS ON THIS QUESTIONNAIRE, HOW DIFFICULT HAVE THESE PROBLEMS MADE IT FOR YOU TO DO YOUR WORK, TAKE CARE OF THINGS AT HOME, OR GET ALONG WITH OTHER PEOPLE: NOT DIFFICULT AT ALL
5. BEING SO RESTLESS THAT IT IS HARD TO SIT STILL: NOT AT ALL
3. WORRYING TOO MUCH ABOUT DIFFERENT THINGS: NOT AT ALL
7. FEELING AFRAID AS IF SOMETHING AWFUL MIGHT HAPPEN: NOT AT ALL
GAD7 TOTAL SCORE: 0
GAD7 TOTAL SCORE: 0
2. NOT BEING ABLE TO STOP OR CONTROL WORRYING: NOT AT ALL

## 2023-11-15 ASSESSMENT — ENCOUNTER SYMPTOMS
NAUSEA: 0
PALPITATIONS: 0
WEAKNESS: 0
FEVER: 0
SORE THROAT: 0
HEMATOCHEZIA: 1
DIARRHEA: 0
PARESTHESIAS: 0
DIZZINESS: 0
ABDOMINAL PAIN: 0
HEADACHES: 0
HEARTBURN: 1
COUGH: 0
CONSTIPATION: 0
FREQUENCY: 0
JOINT SWELLING: 0
BREAST MASS: 0
ARTHRALGIAS: 0
EYE PAIN: 0
MYALGIAS: 0
CHILLS: 0
NERVOUS/ANXIOUS: 0
HEMATURIA: 0
SHORTNESS OF BREATH: 0
DYSURIA: 0

## 2023-11-15 ASSESSMENT — ACTIVITIES OF DAILY LIVING (ADL): CURRENT_FUNCTION: NO ASSISTANCE NEEDED

## 2023-11-15 NOTE — PROGRESS NOTES
[facility]  Gynecology Visit    SUBJECTIVE  Shayla Mcclure is a 38 year old  who is here for annual gynecologic visit. States there have been no changes in her personal or family medical history since she was last since. She had a son in April [facility] and is still breast feeding. Endorses menses returned in October and were light but most recent menses on 23 was a bit heavier. Endorses history of dysmenorrhea and heavy menses with regular 28-29 day cycles. No changes in vaginal discharge, vaginal itchiness or irritation, or sexual health concerns. Endorses prior breast implants at age 16, but states she has not had any noticeable lumps, breast pain, skin changes, or nipple discharge outside of lactation. Not currently using anything for contraception as her partner is planning a vasectomy. Having regular bowel movements. Endorses occasional stress urinary incontinence, but no dysuria or other issues with voiding. States a strong family history of thyroid disease and would like her thyroid hormone levels evaluated.     Her LMP is: Patient's last menstrual period was 2023.    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     ALLERGIES  Allergies   Allergen Reactions    Clindamycin        OBSTETRIC/GYNECOLOGIC HISTORY  Period cycle/length/flow/associated symptoms: 28-29 day cycles; currently having lighter than average flow while  breastfeeding  Current contraception: partner is planning vasectomy  Number of partners in last year: 1  History of STDs: no  Lab Results   Component Value Date    PAP NIL 2018      History of abnormal Pap smear: No  Last pap: 2018, HPV negative; will collect today    OB History    Para Term  AB Living   4 2 0 2 2 2   SAB IAB Ectopic Multiple Live Births   2 0 0 0 2      # Outcome Date GA Lbr Erik/2nd Weight Sex Delivery Anes PTL Lv   4  04/10/23 36w5d  3.44 kg (7 lb 9.3 oz) M CS-LTranv Spinal  DOLORES      Name: GEENA CAMPOS      Apgar1: 8  Apgar5: 9   3 2021 8w0d       FD   2 SAB 20     AB, MISSED      1  06/02/15 27w3d  0.652 kg (1 lb 7 oz) F CS-LTranv  N DOLORES     PAST SURGICAL HISTORY   Past Surgical History:   Procedure Laterality Date    C/SECTION, LOW TRANSVERSE      delivered at 27w3d for pre-eclampsia with severe features     COSMETIC MAMMOPLASTY AUGMENTATION BILATERAL      when 16 years old    DILATION AND CURETTAGE SUCTION N/A 2020    Procedure: DILATION AND CURETTAGE, UTERUS, USING SUCTION;  Surgeon: Haydee Mcpherson DO;  Location: [facility] OR    DILATION AND CURETTAGE SUCTION  2021    repeat procedure for retained POC after prior suction D&C 2021    DILATION AND CURETTAGE SUCTION N/A 2021    Procedure: DILATION AND CURETTAGE, UTERUS, USING SUCTION;  Surgeon: Haydee Mcpherson DO;  Location: [facility] OR    MYOMECTOMY UTERUS  2012    open myomectomy in [place], reported very large fibroids     SOCIAL HISTORY  Social History     Tobacco Use    Smoking status: Never    Smokeless tobacco: Never   Vaping Use    Vaping Use: Never used   Substance Use Topics    Alcohol use: Yes     Comment: occasionally    Drug use: No     Social History     Social History Narrative    Patient and her  moved to [place] (from [place] via [place]) for their daughter with Scooter's Syndrome.     Very happy in [place].     Silvia Leyva     FAMILY HISTORY    Family  History   Problem Relation Age of Onset    Other - See Comments Mother         heavy menstrual cycles    Hashimoto's thyroiditis Mother     Lupus Father     Lupus Maternal Aunt     Leukemia Maternal Grandmother     Cancer No family hx of         Scooter's Syndrome     REVIEW OF SYSTEMS  A 10 point review of systems including Constitutional, Eyes, Respiratory, Cardiovascular, Gastroenterology, Genitourinary, Integumentary, Musculoskeletal, and Psychiatric, were all negative, except for pertinent positives noted in the above HPI.    OBJECTIVE  /68   Pulse 84   Wt 64.4 kg (142 lb)   LMP 2023   BMI 27.52 kg/m    General: Alert, without distress  HEENT: normocephalic, without obvious abnormality; normal thyroid gland  Breast: Within normal limits bilaterally, no nipple discharge, no lymphadenopathy  Cardiovascular: regular rate and rhythm, no murmurs/rubs/gallops  Lungs: clear to auscultation bilaterally  Abdomen: soft, non-tender, non-distended, normal bowel sounds  Pelvic: normal external female genitalia; normal vagina without discharge; normal cervix without lesions/masses, pap smear collected; uterus normal, anteverted, mobile, nontender; adnexae nontender and without masses; normal anus/perineum  Extremities: normal; warm and well perfused    ASSESSMENT  Shayla Mcclure is a 38 year old  who is here for annual gynecologic examination.    PLAN    #Health Maintenance  - Pap test collected today; will be in touch with results  - Orders placed for yearly mammogram given patient is 38 - given breastfeeding status, recommendation to pump prior to imaging; given previous breast implants, MA screen for implants ordered   - Has already received annual influenza vaccine  - Discussed starting colonoscopies at age 43 given no family history of colon cancer    #Family history of thyroid disease  - Given significant family history of thyroid disease, will check a TSH and a free T4 level     RTC in one year  "for an annual examination.    Patient seen and plan of care discussed with Dr. Gordon Pickard MD  Obstetrics and Gynecology, PGY-1  11/15/23 9:36 PM       Answers submitted by the patient for this visit:  VERNON-7 (Submitted on 11/15/2023)  VERNON 7 TOTAL SCORE: 0  Annual Preventive Visit (Submitted on 11/15/2023)  Chief Complaint: Annual Exam:  In general, how would you rate your overall physical health?: good  Frequency of exercise:: None  Do you usually eat at least 4 servings of fruit and vegetables a day, include whole grains & fiber, and avoid regularly eating high fat or \"junk\" foods? : Yes  Taking medications regularly:: Yes  Medication side effects:: None  Activities of Daily Living: no assistance needed  Home safety: no safety concerns identified  Hearing Impairment:: no hearing concerns  In the past 6 months, have you been bothered by leaking of urine?: Yes  abdominal pain: No  Blood in stool: Yes  Blood in urine: No  chest pain: No  chills: No  congestion: No  constipation: No  cough: No  diarrhea: No  dizziness: No  ear pain: No  eye pain: No  nervous/anxious: No  fever: No  frequency: No  genital sores: No  headaches: No  hearing loss: No  heartburn: Yes  arthralgias: No  joint swelling: No  peripheral edema: No  mood changes: No  myalgias: No  nausea: No  dysuria: No  palpitations: No  Skin sensation changes: No  sore throat: No  urgency: No  rash: No  shortness of breath: No  visual disturbance: No  weakness: No  pelvic pain: No  vaginal bleeding: No  vaginal discharge: Yes  tenderness: Yes  breast mass: No  breast discharge: No  In general, how would you rate your overall mental or emotional health?: good  Additional concerns today:: No    I have seen and examined the patient with the resident. I have reviewed, edited, and agree with the note.   Normal annual exam.  Silvia Leyva MD      "

## 2023-11-15 NOTE — LETTER
11/15/2023       RE: Shayla Mcclure  [address] [facility] [address]     Dear Colleague,    Thank you for referring your patient, Shayla Mcclure, to the [facility] [place] [facility]. Please see a copy of my visit note below.    [facility]  Gynecology Visit    SUBJECTIVE  Shayla Mcclure is a 38 year old  who is here for annual gynecologic visit. States there have been no changes in her personal or family medical history since she was last since. She [facility] and is still breast feeding. Endorses menses returned in October and were light but most recent menses on 23 was a bit heavier. Endorses history of dysmenorrhea and heavy menses with regular 28-29 day cycles. No changes in vaginal discharge, vaginal itchiness or irritation, or sexual health concerns. Endorses prior breast implants at age 16, but states she has not had any noticeable lumps, breast pain, skin changes, or nipple discharge outside of lactation. Not currently using anything for contraception as her partner is planning a vasectomy. Having regular bowel movements. Endorses occasional stress urinary incontinence, but no dysuria or other issues with voiding. States a strong family history of thyroid disease and would like her thyroid hormone levels evaluated.     Her LMP is: Patient's last menstrual period was 2023.    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     ALLERGIES  Allergies   Allergen Reactions    Clindamycin        OBSTETRIC/GYNECOLOGIC HISTORY  Period cycle/length/flow/associated symptoms: 28-29 day cycles; currently having lighter than average flow while breastfeeding  Current contraception: partner is planning vasectomy  Number of partners in last year: 1  History of STDs: no  Lab Results   Component Value Date    PAP NIL 2018      History of abnormal Pap smear: No  Last pap: 2018, HPV negative; will collect today    OB History    Para Term  AB Living   4 2 0 2 2 2   SAB IAB Ectopic Multiple Live Births   2 0 0 0 2      # Outcome Date GA Lbr Erik/2nd Weight Sex Delivery Anes PTL Lv   4  04/10/23 36w5d  3.44 kg (7 lb 9.3 oz) M CS-LTranv Spinal  DOLORES      Name: GEENA CAMPOS      Apgar1: 8  Apgar5: 9   3 2021 8w0d       FD   2 SAB 20     AB, MISSED      1  06/02/15 27w3d  0.652 kg (1 lb 7 oz) F CS-LTranv  N DOLORES     PAST SURGICAL HISTORY   Past Surgical History:   Procedure Laterality Date    C/SECTION, LOW TRANSVERSE      delivered at 27w3d for pre-eclampsia with severe features     COSMETIC MAMMOPLASTY AUGMENTATION BILATERAL      when 16 years old    DILATION AND CURETTAGE SUCTION N/A 2020    Procedure: DILATION AND CURETTAGE, UTERUS, USING SUCTION;  Surgeon: Haydee Mcpherson DO;  Location:  OR    DILATION AND CURETTAGE SUCTION  2021    repeat procedure for retained POC after prior suction D&C 2021    DILATION AND CURETTAGE SUCTION N/A 2021    Procedure: DILATION AND CURETTAGE, UTERUS, USING SUCTION;  Surgeon: Haydee Mcpherson DO;  Location:  OR    MYOMECTOMY UTERUS  2012    open myomectomy in [place], reported very large fibroids     SOCIAL HISTORY  Social History     Tobacco Use    Smoking status: Never    Smokeless tobacco: Never   Vaping Use    Vaping Use: Never used   Substance Use  Topics    Alcohol use: Yes     Comment: occasionally    Drug use: No     Social History     Social History Narrative    Patient and her  moved to [place] (from [place] via [place]) for their daughter with Scooter's Syndrome.     Very happy in [place].     Silvia Leyva     FAMILY HISTORY    Family History   Problem Relation Age of Onset    Other - See Comments Mother         heavy menstrual cycles    Hashimoto's thyroiditis Mother     Lupus Father     Lupus Maternal Aunt     Leukemia Maternal Grandmother     Cancer No family hx of         Scooter's Syndrome     REVIEW OF SYSTEMS  A 10 point review of systems including Constitutional, Eyes, Respiratory, Cardiovascular, Gastroenterology, Genitourinary, Integumentary, Musculoskeletal, and Psychiatric, were all negative, except for pertinent positives noted in the above HPI.    OBJECTIVE  /68   Pulse 84   Wt 64.4 kg (142 lb)   LMP 2023   BMI 27.52 kg/m    General: Alert, without distress  HEENT: normocephalic, without obvious abnormality; normal thyroid gland  Breast: Within normal limits bilaterally, no nipple discharge, no lymphadenopathy  Cardiovascular: regular rate and rhythm, no murmurs/rubs/gallops  Lungs: clear to auscultation bilaterally  Abdomen: soft, non-tender, non-distended, normal bowel sounds  Pelvic: normal external female genitalia; normal vagina without discharge; normal cervix without lesions/masses, pap smear collected; uterus normal, anteverted, mobile, nontender; adnexae nontender and without masses; normal anus/perineum  Extremities: normal; warm and well perfused    ASSESSMENT  Shayla Mcclure is a 38 year old  who is here for annual gynecologic examination.    PLAN    #Health Maintenance  - Pap test collected today; will be in touch with results  - Orders placed for yearly mammogram given patient is 38 - given breastfeeding status, recommendation to pump prior to imaging; given previous breast implants, MA screen for  "implants ordered   - Has already received annual influenza vaccine  - Discussed starting colonoscopies at age 43 given no family history of colon cancer    #Family history of thyroid disease  - Given significant family history of thyroid disease, will check a TSH and a free T4 level     RTC in one year for an annual examination.    Patient seen and plan of care discussed with Dr. Gordon Pickard MD  Obstetrics and Gynecology, PGY-1  11/15/23 9:36 PM       Answers submitted by the patient for this visit:  VERNON-7 (Submitted on 11/15/2023)  VERNON 7 TOTAL SCORE: 0  Annual Preventive Visit (Submitted on 11/15/2023)  Chief Complaint: Annual Exam:  In general, how would you rate your overall physical health?: good  Frequency of exercise:: None  Do you usually eat at least 4 servings of fruit and vegetables a day, include whole grains & fiber, and avoid regularly eating high fat or \"junk\" foods? : Yes  Taking medications regularly:: Yes  Medication side effects:: None  Activities of Daily Living: no assistance needed  Home safety: no safety concerns identified  Hearing Impairment:: no hearing concerns  In the past 6 months, have you been bothered by leaking of urine?: Yes  abdominal pain: No  Blood in stool: Yes  Blood in urine: No  chest pain: No  chills: No  congestion: No  constipation: No  cough: No  diarrhea: No  dizziness: No  ear pain: No  eye pain: No  nervous/anxious: No  fever: No  frequency: No  genital sores: No  headaches: No  hearing loss: No  heartburn: Yes  arthralgias: No  joint swelling: No  peripheral edema: No  mood changes: No  myalgias: No  nausea: No  dysuria: No  palpitations: No  Skin sensation changes: No  sore throat: No  urgency: No  rash: No  shortness of breath: No  visual disturbance: No  weakness: No  pelvic pain: No  vaginal bleeding: No  vaginal discharge: Yes  tenderness: Yes  breast mass: No  breast discharge: No  In general, how would you rate your overall mental or emotional " health?: good  Additional concerns today:: No    I have seen and examined the patient with the resident. I have reviewed, edited, and agree with the note.   Normal annual exam.  Silvia Leyva MD

## 2023-11-18 LAB
BKR LAB AP GYN ADEQUACY: NORMAL
BKR LAB AP GYN INTERPRETATION: NORMAL
BKR LAB AP HPV REFLEX: NORMAL
BKR LAB AP LMP: NORMAL
BKR LAB AP PREVIOUS ABNORMAL: NORMAL
PATH REPORT.COMMENTS IMP SPEC: NORMAL
PATH REPORT.COMMENTS IMP SPEC: NORMAL
PATH REPORT.RELEVANT HX SPEC: NORMAL

## 2023-11-21 LAB
HUMAN PAPILLOMA VIRUS 16 DNA: NEGATIVE
HUMAN PAPILLOMA VIRUS 18 DNA: NEGATIVE
HUMAN PAPILLOMA VIRUS FINAL DIAGNOSIS: NORMAL
HUMAN PAPILLOMA VIRUS OTHER HR: NEGATIVE

## 2024-02-29 ENCOUNTER — ANCILLARY PROCEDURE (OUTPATIENT)
Dept: GENERAL RADIOLOGY | Facility: CLINIC | Age: 39
End: 2024-02-29
Attending: FAMILY MEDICINE
Payer: COMMERCIAL

## 2024-02-29 ENCOUNTER — PRE VISIT (OUTPATIENT)
Dept: ORTHOPEDICS | Facility: CLINIC | Age: 39
End: 2024-02-29

## 2024-02-29 ENCOUNTER — OFFICE VISIT (OUTPATIENT)
Dept: ORTHOPEDICS | Facility: CLINIC | Age: 39
End: 2024-02-29
Payer: COMMERCIAL

## 2024-02-29 VITALS — WEIGHT: 142 LBS | BODY MASS INDEX: 27.88 KG/M2 | HEIGHT: 60 IN

## 2024-02-29 DIAGNOSIS — M25.561 RIGHT KNEE PAIN: ICD-10-CM

## 2024-02-29 DIAGNOSIS — M23.91 LOCKING OF RIGHT KNEE: ICD-10-CM

## 2024-02-29 DIAGNOSIS — G89.29 CHRONIC PAIN OF RIGHT KNEE: Primary | ICD-10-CM

## 2024-02-29 DIAGNOSIS — M25.561 CHRONIC PAIN OF RIGHT KNEE: Primary | ICD-10-CM

## 2024-02-29 PROCEDURE — 73562 X-RAY EXAM OF KNEE 3: CPT | Mod: RT | Performed by: RADIOLOGY

## 2024-02-29 PROCEDURE — 99213 OFFICE O/P EST LOW 20 MIN: CPT | Performed by: FAMILY MEDICINE

## 2024-02-29 NOTE — LETTER
2/29/2024      RE: Shayla Mcclure  [address] [facility] [address]     Dear Colleague,    Thank you for referring your patient, Shayla Mcclure, to the [facility] [place]. Please see a copy of my visit note below.    ASSESSMENT/PLAN:    (M25.561,  G89.29) Chronic pain of right knee  (primary encounter diagnosis)  Comment: exam concerning for locking/catching medial and patellar pain; will check MRI for MM tear vs chondral injury of patella; fitted for brace given instability; she is breastfeeding so will use topical nsaid; f/up in clinic after MRI; precautions given  Plan: XR Knee Right 3 Views, MR Knee Right w/o         Contrast, diclofenac (VOLTAREN) 1 % topical         gel, Ankle/Knee Bracing Supplies Order Hinged         Knee Brace; Right          (M23.91) Locking of right knee  Comment: see above  Plan: MR Knee Right w/o Contrast, Ankle/Knee Bracing         Supplies Order Hinged Knee Brace; Right          Aleksandr Hotl MD  February 29, 2024  1:29 PM        Pt is a 38 year old female here today for:     Right anterior Knee pain : pain is deep in knee   Duration? 4 months ago; worsening in the last few days   Injury/ Inciting activity? No injury, possibly from overuse helping her special needs daughter and lifting wheelchairs   Pop? Yes, with movement   Swelling? None   Limited motion? Decreased motion due to pain    Locking/ Catching? Yes - needs to unlock; difficult to extend   Giving way/ instability? Yes, mostly with stairs   Imaging? None   Treatment? 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     History of severe pre-eclampsia     delivered at 27w3d     Leiomyoma     prior laparotomy for two large fibroids       Past Surgical History:   Procedure Laterality Date     C/SECTION, LOW TRANSVERSE      delivered at 27w3d for pre-eclampsia with severe features      COSMETIC MAMMOPLASTY AUGMENTATION  BILATERAL      when 16 years old     DILATION AND CURETTAGE SUCTION N/A 04/16/2020    Procedure: DILATION AND CURETTAGE, UTERUS, USING SUCTION;  Surgeon: Haydee Mcpherson DO;  Location: [facility] OR     DILATION AND CURETTAGE SUCTION  09/23/2021    repeat procedure for retained POC after prior suction D&C 09/20/2021     DILATION AND CURETTAGE SUCTION N/A 9/20/2021    Procedure: DILATION AND CURETTAGE, UTERUS, USING SUCTION;  Surgeon: Haydee Mcpherson DO;  Location: [facility] OR     MYOMECTOMY UTERUS  2012    open myomectomy in [place], reported very large fibroids      Current Outpatient Medications   Medication Sig Dispense Refill     adapalene (DIFFERIN) 0.1 % external cream Apply a pea-sized amount evenly over the face at nighttime before bed. 45 g 11     cetirizine (ZYRTEC) 10 MG tablet Take 1 tablet (10 mg) by mouth daily 90 tablet 3     diclofenac (VOLTAREN) 75 MG EC tablet Take one tab by mouth twice daily for two weeks. 60 tablet 0     hydrOXYzine (ATARAX) 10 MG tablet Take 1 tablet (10 mg) by mouth nightly as needed for itching 30 tablet 11     tretinoin (RETIN-A) 0.025 % external cream Apply a pea-sized amount evenly over face at nighttime before bed 45 g 11     triamcinolone (KENALOG) 0.1 % external ointment Apply twice daily as needed for hives 80 g 11      Allergies   Allergen Reactions     Clindamycin       ROS:   Gen- no fevers/chills   Rheum - no morning stiffness   Derm - no rash/ redness   Neuro - no numbness, no tingling   Remainder of ROS negative.     Exam:   There were no vitals taken for this visit.       R Knee:   ROM: 0-130; Crepitus: No   Effusion: 1+ ; Swelling: YES   Strength: Full in flexion/ extension   Tenderness: Patella - No Medial joint line - YES; Lateral joint line - NO; Quad tendon - NO; Patellar tendon- YES; Hamstring - NO.   Cruciates: anterior drawer - neg/posterior drawer -neg. Lachman - neg   Collaterals: varus -neg/valgus -neg.   Patella: patellar compression - POS; single leg bend-  POS   Meniscus: [place] - POS; [place]ssaly - POS  Maneuvers: Felisha - neg       [place] on [place], [place] [facility] [place] personally reviewed with patient at time of visit     My impression: Neg       Again, thank you for allowing me to participate in the care of your patient.      Sincerely,    Aleksandr Holt MD

## 2024-02-29 NOTE — TELEPHONE ENCOUNTER
DIAGNOSIS: right knee pain x2 months / self / no image / medica     APPOINTMENT DATE: 2.29.24   NOTES STATUS DETAILS   MEDICATION LIST Internal

## 2024-02-29 NOTE — PROGRESS NOTES
ASSESSMENT/PLAN:    (M25.561,  G89.29) Chronic pain of right knee  (primary encounter diagnosis)  Comment: exam concerning for locking/catching medial and patellar pain; will check MRI for MM tear vs chondral injury of patella; fitted for brace given instability; she is breastfeeding so will use topical nsaid; f/up in clinic after MRI; precautions given  Plan: XR Knee Right 3 Views, MR Knee Right w/o         Contrast, diclofenac (VOLTAREN) 1 % topical         gel, Ankle/Knee Bracing Supplies Order Hinged         Knee Brace; Right          (M23.91) Locking of right knee  Comment: see above  Plan: MR Knee Right w/o Contrast, Ankle/Knee Bracing         Supplies Order Hinged Knee Brace; Right          Aleksandr Holt MD  February 29, 2024  1:29 PM        Pt is a 38 year old female here today for:     Right anterior Knee pain : pain is deep in knee   Duration? 4 months ago; worsening in the last few days   Injury/ Inciting activity? No injury, possibly from overuse helping her special needs daughter and lifting wheelchairs   Pop? Yes, with movement   Swelling? None   Limited motion? Decreased motion due to pain    Locking/ Catching? Yes - needs to unlock; difficult to extend   Giving way/ instability? Yes, mostly with stairs   Imaging? None   Treatment? 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    History of severe pre-eclampsia     delivered at 27w3d    Leiomyoma     prior laparotomy for two large fibroids       Past Surgical History:   Procedure Laterality Date    C/SECTION, LOW TRANSVERSE      delivered at 27w3d for pre-eclampsia with severe features     COSMETIC MAMMOPLASTY AUGMENTATION BILATERAL      when 16 years old    DILATION AND CURETTAGE SUCTION N/A 04/16/2020    Procedure: DILATION AND CURETTAGE, UTERUS, USING SUCTION;  Surgeon: Haydee Mcpherson DO;  Location:  OR    DILATION AND CURETTAGE SUCTION  09/23/2021    repeat procedure for  retained POC after prior suction D&C 09/20/2021    DILATION AND CURETTAGE SUCTION N/A 9/20/2021    Procedure: DILATION AND CURETTAGE, UTERUS, USING SUCTION;  Surgeon: Haydee Mcpherson DO;  Location:  OR    MYOMECTOMY UTERUS  2012    open myomectomy in [place], reported very large fibroids      Current Outpatient Medications   Medication Sig Dispense Refill    adapalene (DIFFERIN) 0.1 % external cream Apply a pea-sized amount evenly over the face at nighttime before bed. 45 g 11    cetirizine (ZYRTEC) 10 MG tablet Take 1 tablet (10 mg) by mouth daily 90 tablet 3    diclofenac (VOLTAREN) 75 MG EC tablet Take one tab by mouth twice daily for two weeks. 60 tablet 0    hydrOXYzine (ATARAX) 10 MG tablet Take 1 tablet (10 mg) by mouth nightly as needed for itching 30 tablet 11    tretinoin (RETIN-A) 0.025 % external cream Apply a pea-sized amount evenly over face at nighttime before bed 45 g 11    triamcinolone (KENALOG) 0.1 % external ointment Apply twice daily as needed for hives 80 g 11      Allergies   Allergen Reactions    Clindamycin       ROS:   Gen- no fevers/chills   Rheum - no morning stiffness   Derm - no rash/ redness   Neuro - no numbness, no tingling   Remainder of ROS negative.     Exam:   There were no vitals taken for this visit.       R Knee:   ROM: 0-130; Crepitus: No   Effusion: 1+ ; Swelling: YES   Strength: Full in flexion/ extension   Tenderness: Patella - No Medial joint line - YES; Lateral joint line - NO; Quad tendon - NO; Patellar tendon- YES; Hamstring - NO.   Cruciates: anterior drawer - neg/posterior drawer -neg. Lachman - neg   Collaterals: varus -neg/valgus -neg.   Patella: patellar compression - POS; single leg bend- POS   Meniscus: [place] - POS; [place]ssaly - POS  Maneuvers: Felisha - neg       Xray of R knee on February 29, 2024 at [facility] [place] location - films personally reviewed with patient at time of visit     My impression: Neg

## 2024-03-14 ENCOUNTER — ANCILLARY PROCEDURE (OUTPATIENT)
Dept: MRI IMAGING | Facility: CLINIC | Age: 39
End: 2024-03-14
Attending: FAMILY MEDICINE
Payer: COMMERCIAL

## 2024-03-14 DIAGNOSIS — M25.561 CHRONIC PAIN OF RIGHT KNEE: ICD-10-CM

## 2024-03-14 DIAGNOSIS — G89.29 CHRONIC PAIN OF RIGHT KNEE: ICD-10-CM

## 2024-03-14 DIAGNOSIS — M23.91 LOCKING OF RIGHT KNEE: ICD-10-CM

## 2024-03-14 PROCEDURE — 73721 MRI JNT OF LWR EXTRE W/O DYE: CPT | Mod: 26 | Performed by: RADIOLOGY

## 2024-03-14 PROCEDURE — 73721 MRI JNT OF LWR EXTRE W/O DYE: CPT | Mod: RT

## 2024-03-16 ENCOUNTER — OFFICE VISIT (OUTPATIENT)
Dept: ORTHOPEDICS | Facility: CLINIC | Age: 39
End: 2024-03-16
Payer: COMMERCIAL

## 2024-03-16 DIAGNOSIS — S83.241D ACUTE TEAR MEDIAL MENISCUS, RIGHT, SUBSEQUENT ENCOUNTER: Primary | ICD-10-CM

## 2024-03-16 PROCEDURE — 99213 OFFICE O/P EST LOW 20 MIN: CPT | Performed by: FAMILY MEDICINE

## 2024-03-16 RX ORDER — NAPROXEN 500 MG/1
500 TABLET ORAL 2 TIMES DAILY WITH MEALS
Qty: 28 TABLET | Refills: 1 | Status: SHIPPED | OUTPATIENT
Start: 2024-03-16 | End: 2024-07-18

## 2024-03-16 NOTE — PROGRESS NOTES
ASSESSMENT/PLAN:    (S80.934X) Acute tear medial meniscus, right, subsequent encounter  (primary encounter diagnosis)  Comment: MRI findings consistent w/ medial pain and mechanical symptoms; will have her see Dr Burgos to discuss a knee scope; in the mean time will start PT and nsaids (precautions given as she is breastfeeding); if no better, could try cortisone injection but this would delay a potential scope; f/up setup w/ me for 4/9/24  Plan: Physical Therapy  Referral, Orthopedic         Referral, naproxen (NAPROSYN) 500 MG tablet          Aleksandr Holt MD  March 16, 2024  8:53 AM      Pt is a 38 year old female last seen on 2/29/24 here for follow up of:     R knee pain - same pain as before but now there is a burning pain occasionally after moving a lot throughout the day      MRI R Knee - 3/14/24:  Findings:     MENISCI:  Medial meniscus: Radial tear in the posterior horn.  Lateral meniscus: Intact.     LIGAMENTS  Cruciate ligaments: Intact.  Medial supporting structures: Intact.  Lateral supporting structures: Intact.     EXTENSOR MECHANISM  Intact.     FLUID  Small joint effusion. No substantial Anderson cyst.     OSSEOUS and ARTICULAR STRUCTURES  Bones: No fracture or contusion. Circumscribed 8 x 7 mm T2  hyperintense lesion in the posterior medial tibial plateau subcortical  bone with well-defined sclerotic rim. Appearance favors nonaggressive  lesion, possibly intraosseous ganglion.     Patellofemoral compartment: Occult full-thickness fissure with  subchondral cystic change overlying the patellar median eminence.     Medial compartment: No hyaline cartilage disease.     Lateral compartment: No hyaline cartilage disease.     ANCILLARY FINDINGS  None.                                                                      Impression:     1. Radial tear of the medial meniscus posterior horn.     2. Focal full-thickness cartilage fissure overlying the patellar  median eminence.      Per my  last note:  (M25.561,  G89.29) Chronic pain of right knee  (primary encounter diagnosis)  Comment: exam concerning for locking/catching medial and patellar pain; will check MRI for MM tear vs chondral injury of patella; fitted for brace given instability; she is breastfeeding so will use topical nsaid; f/up in clinic after MRI; precautions given  Plan: XR Knee Right 3 Views, MR Knee Right w/o         Contrast, diclofenac (VOLTAREN) 1 % topical         gel, Ankle/Knee Bracing Supplies Order Hinged         Knee Brace; Right          (M23.91) Locking of right knee  Comment: see above  Plan: MR Knee Right w/o Contrast, Ankle/Knee Bracing         Supplies Order Hinged Knee Brace; Right      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 45 g 11    cetirizine (ZYRTEC) 10 MG tablet Take 1 tablet (10 mg) by mouth daily 90 tablet 3    diclofenac (VOLTAREN) 1 % topical gel Apply 2 g topically 4 times daily For R knee pain 100 g 1    diclofenac (VOLTAREN) 75 MG EC tablet Take one tab by mouth twice daily for two weeks. 60 tablet 0    hydrOXYzine (ATARAX) 10 MG tablet Take 1 tablet (10 mg) by mouth nightly as needed for itching 30 tablet 11    tretinoin (RETIN-A) 0.025 % external cream Apply a pea-sized amount evenly over face at nighttime before bed 45 g 11    triamcinolone (KENALOG) 0.1 % external ointment Apply twice daily as needed for hives 80 g 11      Allergies   Allergen Reactions    Clindamycin       ROS:   Gen- no fevers/chills   Derm - no rash/ redness   Neuro - see HPI   Remainder of ROS negative.     Exam:

## 2024-03-16 NOTE — LETTER
3/16/2024      RE: Shayla Mcclure  [address] [facility] [address]     Dear Colleague,    Thank you for referring your patient, Shayla Mcclure, to the [facility] [place]. Please see a copy of my visit note below.    ASSESSMENT/PLAN:    (X49.578O) Acute tear medial meniscus, right, subsequent encounter  (primary encounter diagnosis)  Comment: MRI findings consistent w/ medial pain and mechanical symptoms; will have her see Dr Burgos to discuss a knee scope; in the mean time will start PT and nsaids (precautions given as she is breastfeeding); if no better, could try cortisone injection but this would delay a potential scope; f/up setup w/ me for 4/9/24  Plan: Physical Therapy  Referral, Orthopedic         Referral, naproxen (NAPROSYN) 500 MG tablet          Aleksandr Holt MD  March 16, 2024  8:53 AM      Pt is a 38 year old female last seen on 2/29/24 here for follow up of:     R knee pain - same pain as before but now there is a burning pain occasionally after moving a lot throughout the day      MRI R Knee - 3/14/24:  Findings:     MENISCI:  Medial meniscus: Radial tear in the posterior horn.  Lateral meniscus: Intact.     LIGAMENTS  Cruciate ligaments: Intact.  Medial supporting structures: Intact.  Lateral supporting structures: Intact.     EXTENSOR MECHANISM  Intact.     FLUID  Small joint effusion. No substantial Anderson cyst.     OSSEOUS and ARTICULAR STRUCTURES  Bones: No fracture or contusion. Circumscribed 8 x 7 mm T2  hyperintense lesion in the posterior medial tibial plateau subcortical  bone with well-defined sclerotic rim. Appearance favors nonaggressive  lesion, possibly intraosseous ganglion.     Patellofemoral compartment: Occult full-thickness fissure with  subchondral cystic change overlying the patellar median eminence.     Medial compartment: No hyaline cartilage disease.     Lateral compartment: No hyaline cartilage disease.     ANCILLARY  FINDINGS  None.                                                                      Impression:     1. Radial tear of the medial meniscus posterior horn.     2. Focal full-thickness cartilage fissure overlying the patellar  median eminence.      Per my last note:  (M25.561,  G89.29) Chronic pain of right knee  (primary encounter diagnosis)  Comment: exam concerning for locking/catching medial and patellar pain; will check MRI for MM tear vs chondral injury of patella; fitted for brace given instability; she is breastfeeding so will use topical nsaid; f/up in clinic after MRI; precautions given  Plan: XR Knee Right 3 Views, MR Knee Right w/o         Contrast, diclofenac (VOLTAREN) 1 % topical         gel, Ankle/Knee Bracing Supplies Order Hinged         Knee Brace; Right          (M23.91) Locking of right knee  Comment: see above  Plan: MR Knee Right w/o Contrast, Ankle/Knee Bracing         Supplies Order Hinged Knee Brace; Right      Past Medical History:   Diagnosis Date     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 45 g 11     cetirizine (ZYRTEC) 10 MG tablet Take 1 tablet (10 mg) by mouth daily 90 tablet 3     diclofenac (VOLTAREN) 1 % topical gel Apply 2 g topically 4 times daily For R knee pain 100 g 1     diclofenac (VOLTAREN) 75 MG EC tablet Take one tab by mouth twice daily for two weeks. 60 tablet 0     hydrOXYzine (ATARAX) 10 MG tablet Take 1 tablet (10 mg) by mouth nightly as needed for itching 30 tablet 11     tretinoin (RETIN-A) 0.025 % external cream Apply a pea-sized amount evenly over face at nighttime before bed 45 g 11     triamcinolone (KENALOG) 0.1 % external ointment Apply twice daily as  needed for hives 80 g 11      Allergies   Allergen Reactions     Clindamycin       ROS:   Gen- no fevers/chills   Derm - no rash/ redness   Neuro - see HPI   Remainder of ROS negative.     Exam:       Again, thank you for allowing me to participate in the care of your patient.      Sincerely,    Aleksandr Holt MD

## 2024-03-16 NOTE — TELEPHONE ENCOUNTER
DIAGNOSIS: Acute tear medial meniscus, right, subsequent encounter [S83.241D]      APPOINTMENT DATE: 03/18/24   NOTES STATUS DETAILS   OFFICE NOTE from referring provider Internal 03/16/24: Dr. Aleksandr Holt   MEDICATION LIST Internal    LABS     CBC/DIFF Care Everywhere Most recent 04/13/23   MRI PACS 03/14/24: MR Knee RT   XRAYS (IMAGES & REPORTS) PACS 02/29/24: XR Knee RT

## 2024-03-18 ENCOUNTER — MYC MEDICAL ADVICE (OUTPATIENT)
Dept: OBGYN | Facility: CLINIC | Age: 39
End: 2024-03-18

## 2024-03-18 ENCOUNTER — OFFICE VISIT (OUTPATIENT)
Dept: ORTHOPEDICS | Facility: CLINIC | Age: 39
End: 2024-03-18
Payer: COMMERCIAL

## 2024-03-18 ENCOUNTER — PRE VISIT (OUTPATIENT)
Dept: ORTHOPEDICS | Facility: CLINIC | Age: 39
End: 2024-03-18

## 2024-03-18 VITALS — HEIGHT: 60 IN | BODY MASS INDEX: 27.88 KG/M2 | WEIGHT: 142 LBS

## 2024-03-18 DIAGNOSIS — M25.561 RIGHT KNEE PAIN, UNSPECIFIED CHRONICITY: Primary | ICD-10-CM

## 2024-03-18 DIAGNOSIS — Z30.011 ENCOUNTER FOR INITIAL PRESCRIPTION OF CONTRACEPTIVE PILLS: Primary | ICD-10-CM

## 2024-03-18 PROCEDURE — 20610 DRAIN/INJ JOINT/BURSA W/O US: CPT | Mod: RT | Performed by: ORTHOPAEDIC SURGERY

## 2024-03-18 PROCEDURE — 99204 OFFICE O/P NEW MOD 45 MIN: CPT | Mod: GC | Performed by: ORTHOPAEDIC SURGERY

## 2024-03-18 RX ORDER — TRIAMCINOLONE ACETONIDE 40 MG/ML
40 INJECTION, SUSPENSION INTRA-ARTICULAR; INTRAMUSCULAR
Status: DISCONTINUED | OUTPATIENT
Start: 2024-03-18 | End: 2024-07-19

## 2024-03-18 RX ORDER — LIDOCAINE HYDROCHLORIDE 5 MG/ML
8 INJECTION, SOLUTION INFILTRATION; INTRAVENOUS
Status: DISCONTINUED | OUTPATIENT
Start: 2024-03-18 | End: 2024-07-19

## 2024-03-18 RX ORDER — NORETHINDRONE ACETATE AND ETHINYL ESTRADIOL .03; 1.5 MG/1; MG/1
1 TABLET ORAL DAILY
Qty: 28 TABLET | Refills: 0 | Status: SHIPPED | OUTPATIENT
Start: 2024-03-18 | End: 2024-07-18

## 2024-03-18 RX ADMIN — TRIAMCINOLONE ACETONIDE 40 MG: 40 INJECTION, SUSPENSION INTRA-ARTICULAR; INTRAMUSCULAR at 10:46

## 2024-03-18 RX ADMIN — LIDOCAINE HYDROCHLORIDE 8 ML: 5 INJECTION, SOLUTION INFILTRATION; INTRAVENOUS at 10:46

## 2024-03-18 NOTE — LETTER
3/18/2024         RE: Shayla Mcclure  [address] [facility] [address]        Dear Colleague,    Thank you for referring your patient, Shayla Mcclure, to the [facility] [place]. Please see a copy of my visit note below.    CHIEF CONCERN: Right knee pain     HISTORY:   Shayla Mcclure is a 38-year-old female who presents for initial evaluation regarding right knee pain.  She began having symptoms approximately 6 months ago with insidious onset.  Her daughter has special knees and she primarily serves as her caregiver. Over the previous 6 months, she has continued to have intermittent symptoms, largely being sharp pain about the medial aspect of the knee.  She denies any mechanical symptoms such as catching, locking, popping. Some disrupted sleep. Has been wearing a hinged neoprene brace which has helped her medial sided pain, although this is exacerbated anterior knee pain.  Aside from this no other knee surgery or trauma.    PAST MEDICAL HISTORY: (Reviewed with the patient and in the [facility] medical record)  None    PAST SURGICAL HISTORY: (Reviewed with the patient and in the [facility] medical record)  None    MEDICATIONS: (Reviewed with the patient and in the [facility] medical record)    Notable medications include: None    ALLERGIES: (Reviewed with the patient and in the EPIC medical record)  Clindamycin     SOCIAL HISTORY: (Reviewed with the patient and in the medical record)  --Tobacco: None  --Occupation: Caregiver, 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 Articulate and  "Interactive    Body mass index is 27.73 kg/m .    Right Lower extremity :  Skin is Warm and Well perfused, no suggestion of infection  Trace effusion  ROM 0-135 (0-135)  TTP about medial joint line. Non-TTP about medial joint line, patellar poles, Gerdy's tubercle, pes anserine tendons  - Lachman or anterior drawer  - Posterior drawer  - [place] and [place]  - instability to varus and valgus stress at 0 and 30 degrees flexion   EHL/FHL/TA/GS 5/5  Sensation intact L3-S1  2+ Dorsalis Pedis Pulse    IMAGING:    Radiographs of the right knee from 2/29/2024 were independently reviewed by me and findings were discussed with the patient today. The imaging demonstrates:  \"1. No acute osseous abnormality.  2. No substantial degenerative change.\"    MRI of the right knee from 3/14/2024 were independently reviewed by me and findings were discussed with the patient today. The imaging demonstrates:  \"1. Radial tear of the medial meniscus posterior horn.   2. Focal full-thickness cartilage fissure overlying the patellar median eminence.\"    ASSESSMENT:  Right knee medial meniscus tear vs root teat equivalent   Right knee patellofemoral degenerative joint disease     PLAN:  We reviewed her radiographs, MRI, and diagnosis of a medial meniscus tear.  Generally for healthy, active individuals would recommend surgical management of this injury in the form of partial meniscectomy versus repair.  Briefly reviewed the risks, benefits, alternatives, recovery, and rehabilitation with each of the above procedures.  This does not represent a good time in her life to proceed with surgery for herself.  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  Her daughter is also having surgery in the coming months.  She would like to defer surgical intervention at this time.  We offered her a intra-articular corticosteroid injection which she would like to proceed with. "  Pending the relief she receives from this, she may still proceed with operative intervention at a minimum of 6 weeks.  She is in agreement this plan.  All of her questions were answered to satisfaction.    - Right knee corticosteroid injection provided today  - Activity modification, brace, ice, PRN OTC medications   - Follow-up PRN pending relief from injection or if wishes to pursue surgical intervention    Seen, examined, and discussed with Dr. Burgos.    Kwan Blood MD  Orthopaedic Surgery PGY-5      Large Joint Injection: R knee joint    Date/Time: 3/18/2024 10:46 AM    Performed by: Jamie Burgos MD  Authorized by: Jamie Burgos MD    Indications:  Pain and osteoarthritis  Needle Size:  21 G  Guidance: landmark guided    Approach:  Superolateral  Location:  Knee      Medications:  40 mg triamcinolone 40 MG/ML; 8 mL lidocaine (PF) 0.5 %  Outcome:  Tolerated well, no immediate complications  Procedure discussed: discussed risks, benefits, and alternatives    Consent Given by:  Patient  Timeout: timeout called immediately prior to procedure    Prep: patient was prepped and draped in usual sterile fashion          Patient seen and examined with the resident. I also personally reviewed the images and interpreted the imaging myself.     Assesment: Right knee medial meniscus tear versus medial meniscus root equivalent tear    Plan: Long discussion with the patient.  Reviewed the diagnosis potential treatment options.  At this time I offered her a corticosteroid injection as she is really not in a life phase where she can undergo arthroscopic procedure.  We did discuss the pros cons risk benefits of surgery versus not surgery we discussed the divergent recovery pathway between meniscus root repair versus meniscectomy.    We will give her an injection today to try to get her to a point when she could potentially consider surgical intervention.    After written informed consent  obtained and signed, after sufficient prepping and sterile technique, 40 mg of kenalog and , 8 cc of 1% lidocaine were injected without complication into the right knee. The patient tolerated the injection well and a sterile dressing was applied.    Going forward if the patient is interested in surgery she can call me or MyChart or we can do a telephone visit to arrange.    I agree with history, physical and imaging as well as the assessment and plan as detailed by Dr. Blood.       Again, thank you for allowing me to participate in the care of your patient.        Sincerely,        Jamie Burgos MD

## 2024-03-18 NOTE — PROGRESS NOTES
Patient seen and examined with the resident. I also personally reviewed the images and interpreted the imaging myself.     Assesment: Right knee medial meniscus tear versus medial meniscus root equivalent tear    Plan: Long discussion with the patient.  Reviewed the diagnosis potential treatment options.  At this time I offered her a corticosteroid injection as she is really not in a life phase where she can undergo arthroscopic procedure.  We did discuss the pros cons risk benefits of surgery versus not surgery we discussed the divergent recovery pathway between meniscus root repair versus meniscectomy.    We will give her an injection today to try to get her to a point when she could potentially consider surgical intervention.    After written informed consent obtained and signed, after sufficient prepping and sterile technique, 40 mg of kenalog and , 8 cc of 1% lidocaine were injected without complication into the right knee. The patient tolerated the injection well and a sterile dressing was applied.    Going forward if the patient is interested in surgery she can call me or MyChart or we can do a telephone visit to arrange.    I agree with history, physical and imaging as well as the assessment and plan as detailed by Dr. Blood.

## 2024-03-18 NOTE — NURSING NOTE
[address] [address]  Dept: [phone]  ______________________________________________________________________________    Patient: Shayla Mcclure   : 1985   MRN: [id number]   2024    INVASIVE PROCEDURE SAFETY CHECKLIST    Date: 2024   Procedure: right knee joint injection with kenalog   Patient Name: Shayla Mcclure  MRN: [id number]  YOB: 1985    Action: Complete sections as appropriate. Any discrepancy results in a HARD COPY until resolved.     PRE PROCEDURE:  Patient ID verified with 2 identifiers (name and  or MRN): Yes  Procedure and site verified with patient/designee (when able): Yes  Accurate consent documentation in medical record: Yes  H&P (or appropriate assessment) documented in medical record: Yes  H&P must be up to 20 days prior to procedure and updates within 24 hours of procedure as applicable: NA  Relevant diagnostic and radiology test results appropriately labeled and displayed as applicable: NA  Procedure site(s) marked with provider initials: NA    TIMEOUT:  Time-Out performed immediately prior to starting procedure, including verbal and active participation of all team members addressing the following:Yes  * Correct patient identify  * Confirmed that the correct side and site are marked  * An accurate procedure consent form  * Agreement on the procedure to be done  * Correct patient position  * Relevant images and results are properly labeled and appropriately displayed  * The need to administer antibiotics or fluids for irrigation purposes during the procedure as applicable   * Safety precautions based on patient history or medication use    DURING PROCEDURE: Verification of correct person, site, and procedures any time the responsibility for care of the patient is transferred to another member of the care team.       Prior to injection, verified patient identity using patient's name and  date of birth.  Due to injection administration, patient instructed to remain in clinic for 15 minutes  afterwards, and to report any adverse reaction to me immediately.    Joint injection was performed.      Lido   Drug Amount Wasted:  Yes: 42 mg/ml   Vial/Syringe: Single dose vial  Expiration Date:  10/01/2024    Kenalog   Drug Amount Wasted: None   Vial/Syringe: Single dose vial  Expiration Date:12/01/2025    Eden Rodas, ATC  March 18, 2024

## 2024-03-18 NOTE — PROGRESS NOTES
CHIEF CONCERN: Right knee pain     HISTORY:   Shayla Mcclure is a 38-year-old female who presents for initial evaluation regarding right knee pain.  She began having symptoms approximately 6 months ago with insidious onset.  Her daughter has special knees and she primarily serves as her caregiver. Over the previous 6 months, she has continued to have intermittent symptoms, largely being sharp pain about the medial aspect of the knee.  She denies any mechanical symptoms such as catching, locking, popping. Some disrupted sleep. Has been wearing a hinged neoprene brace which has helped her medial sided pain, although this is exacerbated anterior knee pain.  Aside from this no other knee surgery or trauma.    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 Articulate and Interactive    Body mass index is 27.73 kg/m .    Right Lower extremity :  Skin is Warm and Well perfused, no suggestion of infection  Trace effusion  ROM 0-135 (0-135)  TTP about medial joint line. Non-TTP about medial joint line, patellar poles, Gerdy's tubercle, pes  "anserine tendons  - Lachman or anterior drawer  - Posterior drawer  - [place] and [place]  - instability to varus and valgus stress at 0 and 30 degrees flexion   EHL/FHL/TA/GS 5/5  Sensation intact L3-S1  2+ Dorsalis Pedis Pulse    IMAGING:    Radiographs of the right knee from 2/29/2024 were independently reviewed by me and findings were discussed with the patient today. The imaging demonstrates:  \"1. No acute osseous abnormality.  2. No substantial degenerative change.\"    MRI of the right knee from 3/14/2024 were independently reviewed by me and findings were discussed with the patient today. The imaging demonstrates:  \"1. Radial tear of the medial meniscus posterior horn.   2. Focal full-thickness cartilage fissure overlying the patellar median eminence.\"    ASSESSMENT:  Right knee medial meniscus tear vs root teat equivalent   Right knee patellofemoral degenerative joint disease     PLAN:  We reviewed her radiographs, MRI, and diagnosis of a medial meniscus tear.  Generally for healthy, active individuals would recommend surgical management of this injury in the form of partial meniscectomy versus repair.  Briefly reviewed the risks, benefits, alternatives, recovery, and rehabilitation with each of the above procedures.  This does not represent a good time in her life to proceed with surgery for herself.  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  Her daughter is also having surgery in the coming months.  She would like to defer surgical intervention at this time.  We offered her a intra-articular corticosteroid injection which she would like to proceed with.  Pending the relief she receives from this, she may still proceed with operative intervention at a minimum of 6 weeks.  She is in agreement this plan.  All of her questions were answered to satisfaction.    - Right knee corticosteroid injection provided today  - " Activity modification, brace, ice, PRN OTC medications   - Follow-up PRN pending relief from injection or if wishes to pursue surgical intervention    Seen, examined, and discussed with Dr. Burgos.    Kwan Blood MD  Orthopaedic Surgery PGY-5      Large Joint Injection: R knee joint    Date/Time: 3/18/2024 10:46 AM    Performed by: Jamie Burgos MD  Authorized by: Jamie Burgos MD    Indications:  Pain and osteoarthritis  Needle Size:  21 G  Guidance: landmark guided    Approach:  Superolateral  Location:  Knee      Medications:  40 mg triamcinolone 40 MG/ML; 8 mL lidocaine (PF) 0.5 %  Outcome:  Tolerated well, no immediate complications  Procedure discussed: discussed risks, benefits, and alternatives    Consent Given by:  Patient  Timeout: timeout called immediately prior to procedure    Prep: patient was prepped and draped in usual sterile fashion

## 2024-04-29 ENCOUNTER — THERAPY VISIT (OUTPATIENT)
Dept: PHYSICAL THERAPY | Facility: CLINIC | Age: 39
End: 2024-04-29
Attending: FAMILY MEDICINE
Payer: COMMERCIAL

## 2024-04-29 DIAGNOSIS — S83.241D ACUTE TEAR MEDIAL MENISCUS, RIGHT, SUBSEQUENT ENCOUNTER: ICD-10-CM

## 2024-04-29 PROCEDURE — 97161 PT EVAL LOW COMPLEX 20 MIN: CPT | Mod: GP

## 2024-04-29 PROCEDURE — 97110 THERAPEUTIC EXERCISES: CPT | Mod: GP

## 2024-04-29 ASSESSMENT — ACTIVITIES OF DAILY LIVING (ADL)
AS_A_RESULT_OF_YOUR_KNEE_INJURY,_HOW_WOULD_YOU_RATE_YOUR_CURRENT_LEVEL_OF_DAILY_ACTIVITY?: ABNORMAL
RISE FROM A CHAIR: ACTIVITY IS SOMEWHAT DIFFICULT
SWELLING: I DO NOT HAVE THE SYMPTOM
WALK: ACTIVITY IS SOMEWHAT DIFFICULT
GIVING WAY, BUCKLING OR SHIFTING OF KNEE: THE SYMPTOM AFFECTS MY ACTIVITY MODERATELY
STAND: ACTIVITY IS MINIMALLY DIFFICULT
SQUAT: ACTIVITY IS SOMEWHAT DIFFICULT
HOW_WOULD_YOU_RATE_THE_CURRENT_FUNCTION_OF_YOUR_KNEE_DURING_YOUR_USUAL_DAILY_ACTIVITIES_ON_A_SCALE_FROM_0_TO_100_WITH_100_BEING_YOUR_LEVEL_OF_KNEE_FUNCTION_PRIOR_TO_YOUR_INJURY_AND_0_BEING_THE_INABILITY_TO_PERFORM_ANY_OF_YOUR_USUAL_DAILY_ACTIVITIES?: 60
PLEASE_INDICATE_YOR_PRIMARY_REASON_FOR_REFERRAL_TO_THERAPY:: KNEE
STIFFNESS: THE SYMPTOM AFFECTS MY ACTIVITY SLIGHTLY
AS_A_RESULT_OF_YOUR_KNEE_INJURY,_HOW_WOULD_YOU_RATE_YOUR_CURRENT_LEVEL_OF_DAILY_ACTIVITY?: ABNORMAL
SIT WITH YOUR KNEE BENT: ACTIVITY IS SOMEWHAT DIFFICULT
GO DOWN STAIRS: ACTIVITY IS SOMEWHAT DIFFICULT
RAW_SCORE: 41
RISE FROM A CHAIR: ACTIVITY IS SOMEWHAT DIFFICULT
SQUAT: ACTIVITY IS SOMEWHAT DIFFICULT
HOW_WOULD_YOU_RATE_THE_OVERALL_FUNCTION_OF_YOUR_KNEE_DURING_YOUR_USUAL_DAILY_ACTIVITIES?: ABNORMAL
PAIN: THE SYMPTOM AFFECTS MY ACTIVITY MODERATELY
KNEEL ON THE FRONT OF YOUR KNEE: ACTIVITY IS SOMEWHAT DIFFICULT
GO UP STAIRS: ACTIVITY IS SOMEWHAT DIFFICULT
SIT WITH YOUR KNEE BENT: ACTIVITY IS SOMEWHAT DIFFICULT
STAND: ACTIVITY IS MINIMALLY DIFFICULT
HOW_WOULD_YOU_RATE_THE_OVERALL_FUNCTION_OF_YOUR_KNEE_DURING_YOUR_USUAL_DAILY_ACTIVITIES?: ABNORMAL
LIMPING: THE SYMPTOM AFFECTS MY ACTIVITY MODERATELY
WEAKNESS: THE SYMPTOM AFFECTS MY ACTIVITY MODERATELY
WALK: ACTIVITY IS SOMEWHAT DIFFICULT
LIMPING: THE SYMPTOM AFFECTS MY ACTIVITY MODERATELY
WEAKNESS: THE SYMPTOM AFFECTS MY ACTIVITY MODERATELY
SWELLING: I DO NOT HAVE THE SYMPTOM
GO DOWN STAIRS: ACTIVITY IS SOMEWHAT DIFFICULT
HOW_WOULD_YOU_RATE_THE_CURRENT_FUNCTION_OF_YOUR_KNEE_DURING_YOUR_USUAL_DAILY_ACTIVITIES_ON_A_SCALE_FROM_0_TO_100_WITH_100_BEING_YOUR_LEVEL_OF_KNEE_FUNCTION_PRIOR_TO_YOUR_INJURY_AND_0_BEING_THE_INABILITY_TO_PERFORM_ANY_OF_YOUR_USUAL_DAILY_ACTIVITIES?: 60
GO UP STAIRS: ACTIVITY IS SOMEWHAT DIFFICULT
KNEEL ON THE FRONT OF YOUR KNEE: ACTIVITY IS SOMEWHAT DIFFICULT
KNEE_ACTIVITY_OF_DAILY_LIVING_SCORE: 58.57
STIFFNESS: THE SYMPTOM AFFECTS MY ACTIVITY SLIGHTLY
PAIN: THE SYMPTOM AFFECTS MY ACTIVITY MODERATELY
GIVING WAY, BUCKLING OR SHIFTING OF KNEE: THE SYMPTOM AFFECTS MY ACTIVITY MODERATELY
KNEE_ACTIVITY_OF_DAILY_LIVING_SUM: 41

## 2024-04-29 NOTE — PROGRESS NOTES
PHYSICAL THERAPY EVALUATION  Type of Visit: Evaluation    See electronic medical record for Abuse and Falls Screening details.    Subjective   Pt reports pain started around 6-8 months ago. She has to do a lot of physical work taking care of her daughter. Pt reports episodes of catching and locking. She feels the damage to the knee may have happened over time, but she had an instance with a lot of pain just getting up to walk. She had a steroid injection, it helped short term and now she does not have pain all day. Pt wears a knee brace during the day but not at night.       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    History of severe pre-eclampsia     delivered at 27w3d    Leiomyoma     prior laparotomy for two large fibroids           Dates & types of surgery:   Past Surgical History:   Procedure Laterality Date    C/SECTION, LOW TRANSVERSE      delivered at 27w3d for pre-eclampsia with severe features     COSMETIC MAMMOPLASTY AUGMENTATION BILATERAL      when 16 years old    DILATION AND CURETTAGE SUCTION N/A 04/16/2020    Procedure: DILATION AND CURETTAGE, UTERUS, USING SUCTION;  Surgeon: Haydee Mcpherson DO;  Location:  OR    DILATION AND CURETTAGE SUCTION  09/23/2021    repeat procedure for retained POC after prior suction D&C 09/20/2021    DILATION AND CURETTAGE SUCTION N/A 9/20/2021    Procedure: DILATION AND CURETTAGE, UTERUS, USING SUCTION;  Surgeon: Haydee Mcpherson DO;  Location: [facility] OR    MYOMECTOMY UTERUS  2012    open myomectomy in [place], reported very large fibroids           Prior diagnostic imaging/testing results: MRI; X-ray     Prior therapy history for the same diagnosis, illness or injury: No        Living Environment  Social support: With a significant other or spouse   Type of home: House   Stairs to  enter the home: Yes       Ramp: No   Stairs inside the home: Yes 2 Is there a railing: Yes   Help at home:    Equipment owned:       Employment: No    Hobbies/Interests:      Patient goals for therapy: Physical work with my special needs child and walk normal speed, go up and down the stairs carrying my son or daughter or both.    Pain assessment: See objective evaluation for additional pain details     Objective   KNEE EVALUATION  PAIN: Pain is Exacerbated By: stairs, walking, squatting, sleeping, sit to stand  INTEGUMENTARY (edema, incisions): WNL  POSTURE: WNL  GAIT: brace donned, mildly antalgic, avoids end range knee extension on R  BALANCE/PROPRIOCEPTION: SL stance x 20 sec on L, x 5 sec on R with pain  ROM: PROM 0-0-100 deg R knee, patient unable to tolerate getting to end range flexion, end feel of joint not felt in flexion as pain was limiting this motion  STRENGTH:  pain with R quad and HS MMT, both 4-/5, L quad and HS 4+/5  FLEXIBILITY: WNL  SPECIAL TESTS:  Houston's positive  FUNCTIONAL TESTS:  squat in brace w/UE support- good form and depth, 5/10 pain at bottom of squat  PALPATION:  TTP in medial joint line, inferior patellar pole  JOINT MOBILITY:  not able to thoroughly assess due to pain level    Assessment & Plan   CLINICAL IMPRESSIONS  Medical Diagnosis: R knee pain, medial meniscus tear, chondromalacia    Treatment Diagnosis: R knee pain, medial meniscus tear, chondromalacia   Impression/Assessment: Patient is a 39 year old female with R knee pain and WB tolerance complaints.  The following significant findings have been identified: Pain, Decreased ROM/flexibility, Decreased strength, Impaired balance, Decreased proprioception, Impaired gait, Impaired muscle performance, and Decreased activity tolerance. These impairments interfere with their ability to perform self care tasks, work tasks, recreational activities, household chores, household mobility, and community mobility as compared to  previous level of function.     Clinical Decision Making (Complexity):  Clinical Presentation: Stable/Uncomplicated  Clinical Presentation Rationale: based on medical and personal factors listed in PT evaluation  Clinical Decision Making (Complexity): Low complexity    PLAN OF CARE  Treatment Interventions:  Interventions: Manual Therapy, Neuromuscular Re-education, Therapeutic Activity, Therapeutic Exercise    Long Term Goals     PT Goal 1  Goal Identifier: LTG  Goal Description: Pt will be able to ambulate and go up stairs with 2/10 R knee pain.  Target Date: 06/25/24      Frequency of Treatment: 1 visit every 2 weeks  Duration of Treatment: 12 weeks      Education Assessment:   Learner/Method: Reading;Demonstration;No Barriers to Learning    Risks and benefits of evaluation/treatment have been explained.   Patient/Family/caregiver agrees with Plan of Care.     Evaluation Time:     PT Eval, Low Complexity Minutes (30992): 30     Signing Clinician: Jose Austin PT      Psychiatric                                                                                   OUTPATIENT PHYSICAL THERAPY      PLAN OF TREATMENT FOR OUTPATIENT REHABILITATION   Patient's Last Name, First Name, Shayla Cr YOB: 1985   Provider's Name   Psychiatric   Medical Record No.  [id number]     Onset Date: 09/30/23  Start of Care Date: 04/29/24     Medical Diagnosis:  R knee pain, medial meniscus tear, chondromalacia      PT Treatment Diagnosis:  R knee pain, medial meniscus tear, chondromalacia Plan of Treatment  Frequency/Duration: 1 visit every 2 weeks/ 12 weeks    Certification date from 04/29/24 to 07/23/24         See note for plan of treatment details and functional goals     Jose Austin PT                         I CERTIFY THE NEED FOR THESE SERVICES FURNISHED UNDER        THIS PLAN OF TREATMENT AND WHILE UNDER MY CARE     (Physician attestation of this  document indicates review and certification of the therapy plan).              Referring Provider:  Aleksandr Holt    Initial Assessment  See Epic Evaluation- Start of Care Date: 04/29/24

## 2024-05-14 ENCOUNTER — THERAPY VISIT (OUTPATIENT)
Dept: PHYSICAL THERAPY | Facility: CLINIC | Age: 39
End: 2024-05-14
Payer: COMMERCIAL

## 2024-05-14 DIAGNOSIS — S83.241D ACUTE TEAR MEDIAL MENISCUS, RIGHT, SUBSEQUENT ENCOUNTER: Primary | ICD-10-CM

## 2024-05-14 PROCEDURE — 97110 THERAPEUTIC EXERCISES: CPT | Mod: GP

## 2024-05-14 PROCEDURE — 97112 NEUROMUSCULAR REEDUCATION: CPT | Mod: GP

## 2024-07-13 ENCOUNTER — OFFICE VISIT (OUTPATIENT)
Dept: URGENT CARE | Facility: URGENT CARE | Age: 39
End: 2024-07-13
Payer: COMMERCIAL

## 2024-07-13 ENCOUNTER — ANCILLARY PROCEDURE (OUTPATIENT)
Dept: GENERAL RADIOLOGY | Facility: CLINIC | Age: 39
End: 2024-07-13
Attending: PHYSICIAN ASSISTANT
Payer: COMMERCIAL

## 2024-07-13 VITALS
OXYGEN SATURATION: 97 % | TEMPERATURE: 98.5 F | RESPIRATION RATE: 17 BRPM | WEIGHT: 144 LBS | HEART RATE: 79 BPM | BODY MASS INDEX: 28.12 KG/M2 | DIASTOLIC BLOOD PRESSURE: 73 MMHG | SYSTOLIC BLOOD PRESSURE: 118 MMHG

## 2024-07-13 DIAGNOSIS — R05.1 ACUTE COUGH: Primary | ICD-10-CM

## 2024-07-13 DIAGNOSIS — R93.89 ABNORMAL CHEST X-RAY: ICD-10-CM

## 2024-07-13 DIAGNOSIS — J22 LOWER RESPIRATORY INFECTION: ICD-10-CM

## 2024-07-13 PROCEDURE — 71046 X-RAY EXAM CHEST 2 VIEWS: CPT | Mod: TC | Performed by: RADIOLOGY

## 2024-07-13 PROCEDURE — 99204 OFFICE O/P NEW MOD 45 MIN: CPT | Performed by: PHYSICIAN ASSISTANT

## 2024-07-13 RX ORDER — METHYLPREDNISOLONE 4 MG
TABLET, DOSE PACK ORAL
Qty: 21 TABLET | Refills: 0 | Status: ON HOLD | OUTPATIENT
Start: 2024-07-13 | End: 2024-07-19

## 2024-07-13 RX ORDER — DOXYCYCLINE HYCLATE 100 MG
100 TABLET ORAL 2 TIMES DAILY
Qty: 14 TABLET | Refills: 0 | Status: ON HOLD | OUTPATIENT
Start: 2024-07-13 | End: 2024-07-19

## 2024-07-13 ASSESSMENT — ENCOUNTER SYMPTOMS
FEVER: 0
NAUSEA: 0
HEADACHES: 0
MYALGIAS: 1
APPETITE CHANGE: 0
COUGH: 1
VOMITING: 0
FATIGUE: 1
RHINORRHEA: 0
BACK PAIN: 1
DIARRHEA: 0
SHORTNESS OF BREATH: 0
SORE THROAT: 0

## 2024-07-13 NOTE — PROGRESS NOTES
SUBJECTIVE:   Shayla Mcclure is a 39 year old female presenting with a chief complaint of   Chief Complaint   Patient presents with    Urgent Care     Persistent cough and crackling sound in left lung for 1+ days.        She is a new patient of [facility].  Patient presents with 14 days of symptoms.  Patient coughing, nonproductive.  No known lung problems.  No hx of DVT.  Denies pregnancy.   Patient has a special needs child and a home visiting nurse listened to her lungs and told her that she has crackles on the left side and should be evaluated.      Treatment:  otc cough syrup.      Review of Systems   Constitutional:  Positive for fatigue. Negative for appetite change and fever.   HENT:  Negative for congestion, ear pain, rhinorrhea and sore throat.    Respiratory:  Positive for cough. Negative for shortness of breath.    Cardiovascular:  Positive for chest pain.   Gastrointestinal:  Negative for diarrhea, nausea and vomiting.   Musculoskeletal:  Positive for back pain and myalgias.   Neurological:  Negative for headaches.   All other systems reviewed and are negative.      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    History of severe pre-eclampsia     delivered at 27w3d    Leiomyoma     prior laparotomy for two large fibroids      Family History   Problem Relation Age of Onset    Other - See Comments Mother         heavy menstrual cycles    Hashimoto's thyroiditis Mother     Lupus Father     Lupus Maternal Aunt     Leukemia Maternal Grandmother     Cancer No family hx of         Scooter's Syndrome     Current Outpatient Medications   Medication Sig Dispense Refill    doxycycline hyclate (VIBRA-TABS) 100 MG tablet Take 1 tablet (100 mg) by mouth 2 times daily for 7 days 14 tablet 0    methylPREDNISolone (MEDROL DOSEPAK) 4 MG tablet therapy pack Follow Package Directions 21 tablet 0    tretinoin (RETIN-A) 0.025 % external cream Apply a pea-sized amount evenly over face  at nighttime before bed 45 g 11    adapalene (DIFFERIN) 0.1 % external cream Apply a pea-sized amount evenly over the face at nighttime before bed. 45 g 11    cetirizine (ZYRTEC) 10 MG tablet Take 1 tablet (10 mg) by mouth daily 90 tablet 3    hydrOXYzine (ATARAX) 10 MG tablet Take 1 tablet (10 mg) by mouth nightly as needed for itching 30 tablet 11    naproxen (NAPROSYN) 500 MG tablet Take 1 tablet (500 mg) by mouth 2 times daily (with meals) 28 tablet 1    norethindrone-ethinyl estradiol (MICROGESTIN 1.5/30) 1.5-30 MG-MCG tablet Take 1 tablet by mouth daily (Patient not taking: Reported on 7/13/2024) 28 tablet 0    triamcinolone (KENALOG) 0.1 % external ointment Apply twice daily as needed for hives 80 g 11     Social History     Tobacco Use    Smoking status: Never    Smokeless tobacco: Never   Substance Use Topics    Alcohol use: Yes     Comment: occasionally       OBJECTIVE  /73   Pulse 79   Temp 98.5  F (36.9  C) (Tympanic)   Resp 17   Wt 65.3 kg (144 lb)   SpO2 97%   BMI 28.12 kg/m      Physical Exam  Vitals and nursing note reviewed.   Constitutional:       Appearance: Normal appearance. She is normal weight.   HENT:      Head: Normocephalic and atraumatic.      Right Ear: Tympanic membrane, ear canal and external ear normal.      Left Ear: Tympanic membrane, ear canal and external ear normal.      Nose: Nose normal.      Mouth/Throat:      Mouth: Mucous membranes are moist.      Pharynx: Oropharynx is clear.      Comments: Large tonsils.  Eyes:      Extraocular Movements: Extraocular movements intact.      Conjunctiva/sclera: Conjunctivae normal.   Cardiovascular:      Rate and Rhythm: Normal rate and regular rhythm.      Pulses: Normal pulses.      Heart sounds: Normal heart sounds.   Pulmonary:      Effort: Pulmonary effort is normal.      Breath sounds: Normal breath sounds.   Musculoskeletal:      Cervical back: Normal range of motion.   Skin:     General: Skin is warm and dry.       Findings: No rash.   Neurological:      General: No focal deficit present.      Mental Status: She is alert.   Psychiatric:         Mood and Affect: Mood normal.         Behavior: Behavior normal.         Labs:  Results for orders placed or performed in visit on 07/13/24 (from the past 24 hour(s))   XR Chest 2 Views    Narrative    EXAM: XR CHEST 2 VIEWS  LOCATION: [facility]  DATE: 7/13/2024    INDICATION:  Acute cough  COMPARISON: None.      Impression    IMPRESSION: There is an ovoid mass along the right heart border measuring approximately 5 x 6 x 5 cm which is indeterminate. Further evaluation with chest CT with IV contrast is recommended. Lungs are otherwise clear. No effusions or pneumothorax. Heart   size is normal. No acute osseous findings.       X-Ray was done, my findings are: Xrays reviewed by myself and independently interpreted.  Any significant discrepancies with official radiologic read, patient will be notified.      No pneumonia.      ASSESSMENT:      ICD-10-CM    1. Acute cough  R05.1 XR Chest 2 Views      2. Abnormal chest x-ray  R93.89 CT Chest w Contrast      3. Lower respiratory infection  J22 doxycycline hyclate (VIBRA-TABS) 100 MG tablet     methylPREDNISolone (MEDROL DOSEPAK) 4 MG tablet therapy pack           Medical Decision Making:    Differential Diagnosis:  URI Adult/Peds:  Bronchitis-viral, Bronchospasm, and Pneumonia    Serious Comorbid Conditions:  Adult:   reviewed    PLAN:    Rx for doxycycline and prednisone.  Discussed xray results.  Chest CT ordered.  I've asked patient to reach out to her PCP for an appropriate workup.  Discussed reasons to seek immediate medical attention.  Additionally if no improvement or worsening in one week, may follow up with PCP and/or UC.        Followup:    If not improving or if condition worsens, follow up with your Primary Care Provider, If not improving or if conditions worsens over the next 12-24 hours, go to the  Emergency Department    There are no Patient Instructions on file for this visit.

## 2024-07-13 NOTE — Clinical Note
Hi there.  I saw Shayla for a cough and a mass on her heart border was found on xray.  I did order the  CT in the spirit of expediting her care.  I've asked her to see you sooner rather than September as scheduled.

## 2024-07-17 ENCOUNTER — HOSPITAL ENCOUNTER (OUTPATIENT)
Dept: CT IMAGING | Facility: CLINIC | Age: 39
Discharge: HOME | End: 2024-07-17
Attending: PHYSICIAN ASSISTANT | Admitting: PHYSICIAN ASSISTANT
Payer: COMMERCIAL

## 2024-07-17 ENCOUNTER — HOSPITAL ENCOUNTER (INPATIENT)
Facility: CLINIC | Age: 39
LOS: 1 days | Discharge: HOME | DRG: 307 | End: 2024-07-19
Attending: EMERGENCY MEDICINE | Admitting: HOSPITALIST
Payer: COMMERCIAL

## 2024-07-17 DIAGNOSIS — Q24.8 PERICARDIAL CYST: ICD-10-CM

## 2024-07-17 DIAGNOSIS — I31.8: Primary | ICD-10-CM

## 2024-07-17 LAB
ALBUMIN SERPL BCG-MCNC: 4.1 G/DL (ref 3.5–5.2)
ALP SERPL-CCNC: 63 U/L (ref 40–150)
ALT SERPL W P-5'-P-CCNC: 36 U/L (ref 0–50)
ANION GAP SERPL CALCULATED.3IONS-SCNC: 11 MMOL/L (ref 7–15)
AST SERPL W P-5'-P-CCNC: 17 U/L (ref 0–45)
BASOPHILS # BLD AUTO: 0.1 10E3/UL (ref 0–0.2)
BASOPHILS NFR BLD AUTO: 1 %
BILIRUB SERPL-MCNC: <0.2 MG/DL
BUN SERPL-MCNC: 19.5 MG/DL (ref 6–20)
CALCIUM SERPL-MCNC: 8.7 MG/DL (ref 8.8–10.4)
CHLORIDE SERPL-SCNC: 103 MMOL/L (ref 98–107)
CREAT SERPL-MCNC: 0.56 MG/DL (ref 0.51–0.95)
EGFRCR SERPLBLD CKD-EPI 2021: >90 ML/MIN/1.73M2
EOSINOPHIL # BLD AUTO: 0.3 10E3/UL (ref 0–0.7)
EOSINOPHIL NFR BLD AUTO: 3 %
ERYTHROCYTE [DISTWIDTH] IN BLOOD BY AUTOMATED COUNT: 12.8 % (ref 10–15)
GLUCOSE SERPL-MCNC: 96 MG/DL (ref 70–99)
HCO3 SERPL-SCNC: 24 MMOL/L (ref 22–29)
HCT VFR BLD AUTO: 42 % (ref 35–47)
HGB BLD-MCNC: 13.8 G/DL (ref 11.7–15.7)
IMM GRANULOCYTES # BLD: 0 10E3/UL
IMM GRANULOCYTES NFR BLD: 0 %
LYMPHOCYTES # BLD AUTO: 4.2 10E3/UL (ref 0.8–5.3)
LYMPHOCYTES NFR BLD AUTO: 36 %
MCH RBC QN AUTO: 29.2 PG (ref 26.5–33)
MCHC RBC AUTO-ENTMCNC: 32.9 G/DL (ref 31.5–36.5)
MCV RBC AUTO: 89 FL (ref 78–100)
MONOCYTES # BLD AUTO: 0.7 10E3/UL (ref 0–1.3)
MONOCYTES NFR BLD AUTO: 6 %
NEUTROPHILS # BLD AUTO: 6.1 10E3/UL (ref 1.6–8.3)
NEUTROPHILS NFR BLD AUTO: 54 %
NRBC # BLD AUTO: 0 10E3/UL
NRBC BLD AUTO-RTO: 0 /100
NT-PROBNP SERPL-MCNC: 44 PG/ML (ref 0–450)
PLATELET # BLD AUTO: 335 10E3/UL (ref 150–450)
POTASSIUM SERPL-SCNC: 4 MMOL/L (ref 3.4–5.3)
PROT SERPL-MCNC: 6.8 G/DL (ref 6.4–8.3)
RBC # BLD AUTO: 4.72 10E6/UL (ref 3.8–5.2)
SODIUM SERPL-SCNC: 138 MMOL/L (ref 135–145)
TROPONIN T SERPL HS-MCNC: <6 NG/L
WBC # BLD AUTO: 11.5 10E3/UL (ref 4–11)

## 2024-07-17 PROCEDURE — 84484 ASSAY OF TROPONIN QUANT: CPT | Performed by: EMERGENCY MEDICINE

## 2024-07-17 PROCEDURE — 99285 EMERGENCY DEPT VISIT HI MDM: CPT | Mod: 25 | Performed by: EMERGENCY MEDICINE

## 2024-07-17 PROCEDURE — 93005 ELECTROCARDIOGRAM TRACING: CPT | Performed by: EMERGENCY MEDICINE

## 2024-07-17 PROCEDURE — 36415 COLL VENOUS BLD VENIPUNCTURE: CPT | Performed by: EMERGENCY MEDICINE

## 2024-07-17 PROCEDURE — 85025 COMPLETE CBC W/AUTO DIFF WBC: CPT | Performed by: EMERGENCY MEDICINE

## 2024-07-17 PROCEDURE — 71260 CT THORAX DX C+: CPT

## 2024-07-17 PROCEDURE — 99285 EMERGENCY DEPT VISIT HI MDM: CPT | Performed by: EMERGENCY MEDICINE

## 2024-07-17 PROCEDURE — 82040 ASSAY OF SERUM ALBUMIN: CPT | Performed by: EMERGENCY MEDICINE

## 2024-07-17 PROCEDURE — 93010 ELECTROCARDIOGRAM REPORT: CPT | Performed by: EMERGENCY MEDICINE

## 2024-07-17 PROCEDURE — 83735 ASSAY OF MAGNESIUM: CPT | Performed by: HOSPITALIST

## 2024-07-17 PROCEDURE — 86140 C-REACTIVE PROTEIN: CPT | Performed by: STUDENT IN AN ORGANIZED HEALTH CARE EDUCATION/TRAINING PROGRAM

## 2024-07-17 PROCEDURE — 250N000011 HC RX IP 250 OP 636: Performed by: PHYSICIAN ASSISTANT

## 2024-07-17 PROCEDURE — 83880 ASSAY OF NATRIURETIC PEPTIDE: CPT | Performed by: EMERGENCY MEDICINE

## 2024-07-17 RX ORDER — IOPAMIDOL 755 MG/ML
70 INJECTION, SOLUTION INTRAVASCULAR ONCE
Status: COMPLETED | OUTPATIENT
Start: 2024-07-17 | End: 2024-07-17

## 2024-07-17 RX ADMIN — IOPAMIDOL 70 ML: 755 INJECTION, SOLUTION INTRAVENOUS at 14:50

## 2024-07-17 ASSESSMENT — COLUMBIA-SUICIDE SEVERITY RATING SCALE - C-SSRS
6. HAVE YOU EVER DONE ANYTHING, STARTED TO DO ANYTHING, OR PREPARED TO DO ANYTHING TO END YOUR LIFE?: NO
2. HAVE YOU ACTUALLY HAD ANY THOUGHTS OF KILLING YOURSELF IN THE PAST MONTH?: NO
1. IN THE PAST MONTH, HAVE YOU WISHED YOU WERE DEAD OR WISHED YOU COULD GO TO SLEEP AND NOT WAKE UP?: NO

## 2024-07-17 ASSESSMENT — ACTIVITIES OF DAILY LIVING (ADL)
ADLS_ACUITY_SCORE: 35

## 2024-07-17 NOTE — LETTER
[facility] [address]  [address] [address]  Phone: [phone]    July 19, 2024        Shayla Mcclure  [address] [address]          To whom it may concern:    RE: Shayla Mcclure    Was treated in the hospital from July 17 to July 19.     Please contact me for questions or concerns.      Sincerely,      Sage Smith MD

## 2024-07-18 ENCOUNTER — APPOINTMENT (OUTPATIENT)
Dept: CARDIOLOGY | Facility: CLINIC | Age: 39
DRG: 307 | End: 2024-07-18
Payer: COMMERCIAL

## 2024-07-18 PROBLEM — Q24.8 PERICARDIAL CYST: Status: ACTIVE | Noted: 2024-07-18

## 2024-07-18 LAB
ATRIAL RATE - MUSE: 77 BPM
CRP SERPL-MCNC: <3 MG/L
DIASTOLIC BLOOD PRESSURE - MUSE: NORMAL MMHG
INTERPRETATION ECG - MUSE: NORMAL
LVEF ECHO: NORMAL
MAGNESIUM SERPL-MCNC: 2 MG/DL (ref 1.7–2.3)
P AXIS - MUSE: 58 DEGREES
PR INTERVAL - MUSE: 120 MS
QRS DURATION - MUSE: 70 MS
QT - MUSE: 376 MS
QTC - MUSE: 425 MS
R AXIS - MUSE: 50 DEGREES
SYSTOLIC BLOOD PRESSURE - MUSE: NORMAL MMHG
T AXIS - MUSE: 33 DEGREES
VENTRICULAR RATE- MUSE: 77 BPM

## 2024-07-18 PROCEDURE — 93306 TTE W/DOPPLER COMPLETE: CPT

## 2024-07-18 PROCEDURE — 99231 SBSQ HOSP IP/OBS SF/LOW 25: CPT | Performed by: SURGERY

## 2024-07-18 PROCEDURE — 36415 COLL VENOUS BLD VENIPUNCTURE: CPT

## 2024-07-18 PROCEDURE — 250N000013 HC RX MED GY IP 250 OP 250 PS 637

## 2024-07-18 PROCEDURE — 120N000002 HC R&B MED SURG/OB UMMC

## 2024-07-18 PROCEDURE — 99222 1ST HOSP IP/OBS MODERATE 55: CPT | Mod: 25 | Performed by: INTERNAL MEDICINE

## 2024-07-18 PROCEDURE — 83735 ASSAY OF MAGNESIUM: CPT

## 2024-07-18 PROCEDURE — 93306 TTE W/DOPPLER COMPLETE: CPT | Mod: 26 | Performed by: INTERNAL MEDICINE

## 2024-07-18 PROCEDURE — 99222 1ST HOSP IP/OBS MODERATE 55: CPT | Performed by: HOSPITALIST

## 2024-07-18 PROCEDURE — 84132 ASSAY OF SERUM POTASSIUM: CPT

## 2024-07-18 RX ORDER — ACETAMINOPHEN 650 MG/1
650 SUPPOSITORY RECTAL EVERY 4 HOURS PRN
Status: DISCONTINUED | OUTPATIENT
Start: 2024-07-18 | End: 2024-07-19 | Stop reason: HOSPADM

## 2024-07-18 RX ORDER — AMOXICILLIN 250 MG
1 CAPSULE ORAL 2 TIMES DAILY PRN
Status: DISCONTINUED | OUTPATIENT
Start: 2024-07-18 | End: 2024-07-19 | Stop reason: HOSPADM

## 2024-07-18 RX ORDER — CALCIUM CARBONATE 500 MG/1
1000 TABLET, CHEWABLE ORAL 4 TIMES DAILY PRN
Status: DISCONTINUED | OUTPATIENT
Start: 2024-07-18 | End: 2024-07-19 | Stop reason: HOSPADM

## 2024-07-18 RX ORDER — ONDANSETRON 4 MG/1
4 TABLET, ORALLY DISINTEGRATING ORAL EVERY 6 HOURS PRN
Status: DISCONTINUED | OUTPATIENT
Start: 2024-07-18 | End: 2024-07-19 | Stop reason: HOSPADM

## 2024-07-18 RX ORDER — LIDOCAINE 40 MG/G
CREAM TOPICAL
Status: DISCONTINUED | OUTPATIENT
Start: 2024-07-18 | End: 2024-07-19 | Stop reason: HOSPADM

## 2024-07-18 RX ORDER — ACETAMINOPHEN 325 MG/1
650 TABLET ORAL EVERY 4 HOURS PRN
Status: DISCONTINUED | OUTPATIENT
Start: 2024-07-18 | End: 2024-07-19 | Stop reason: HOSPADM

## 2024-07-18 RX ORDER — AMOXICILLIN 250 MG
2 CAPSULE ORAL 2 TIMES DAILY PRN
Status: DISCONTINUED | OUTPATIENT
Start: 2024-07-18 | End: 2024-07-19 | Stop reason: HOSPADM

## 2024-07-18 RX ORDER — CODEINE PHOSPHATE AND GUAIFENESIN 10; 100 MG/5ML; MG/5ML
5 SOLUTION ORAL EVERY 4 HOURS PRN
Status: DISCONTINUED | OUTPATIENT
Start: 2024-07-18 | End: 2024-07-19 | Stop reason: HOSPADM

## 2024-07-18 RX ORDER — ONDANSETRON 2 MG/ML
4 INJECTION INTRAMUSCULAR; INTRAVENOUS EVERY 6 HOURS PRN
Status: DISCONTINUED | OUTPATIENT
Start: 2024-07-18 | End: 2024-07-19 | Stop reason: HOSPADM

## 2024-07-18 RX ADMIN — ACETAMINOPHEN 650 MG: 325 TABLET, FILM COATED ORAL at 22:54

## 2024-07-18 ASSESSMENT — ACTIVITIES OF DAILY LIVING (ADL)
ADLS_ACUITY_SCORE: 35
ADLS_ACUITY_SCORE: 18
ADLS_ACUITY_SCORE: 35

## 2024-07-18 NOTE — ED PROVIDER NOTES
[place] EMERGENCY DEPARTMENT ([facility])    7/17/24         History     Chief Complaint   Patient presents with    Cough    Shortness of Breath    Chest Pain     HPI  Shayla Mcclure is a 39 year old female who with PMH of intramural leimyoma of uterus who presents to the ED due to cough, SOB and chest pain as well as findings of pericardial cyst on CT scan.  She states that for the past 3 weeks she has been having progressive shortness of breath as well as cough.  She also notes occasional sharp chest pain that radiates to her back from the right side of her chest.  She also notes worsening orthopnea over the past 3 days.  Symptoms are exacerbated by lying flat or exertion.  Patient initially thought symptoms were due to URI or pneumonia.  She has not had similar symptoms in the past previously.  Was in contact with PCP and had a CT scan done today, this showed a large pericardial cyst.  No previous history of this.  She states that she did notice some bilateral leg swelling in April but this resolved.  No other symptoms noted.        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    History of severe pre-eclampsia     delivered at 27w3d    Leiomyoma     prior laparotomy for two large fibroids      Past Surgical History:   Procedure Laterality Date    C/SECTION, LOW TRANSVERSE      delivered at 27w3d for pre-eclampsia with severe features     COSMETIC MAMMOPLASTY AUGMENTATION BILATERAL      when 16 years old    DILATION AND CURETTAGE SUCTION N/A 04/16/2020    Procedure: DILATION AND CURETTAGE, UTERUS, USING SUCTION;  Surgeon: Haydee Mcpherson DO;  Location:  OR    DILATION AND CURETTAGE SUCTION  09/23/2021    repeat procedure for retained POC after prior suction D&C 09/20/2021    DILATION AND CURETTAGE SUCTION N/A 9/20/2021    Procedure: DILATION AND CURETTAGE, UTERUS, USING SUCTION;  Surgeon: Haydee Mcpherson DO;  Location:  OR     MYOMECTOMY UTERUS  2012    open myomectomy in [place], reported very large fibroids     adapalene (DIFFERIN) 0.1 % external cream  cetirizine (ZYRTEC) 10 MG tablet  doxycycline hyclate (VIBRA-TABS) 100 MG tablet  hydrOXYzine (ATARAX) 10 MG tablet  methylPREDNISolone (MEDROL DOSEPAK) 4 MG tablet therapy pack  naproxen (NAPROSYN) 500 MG tablet  norethindrone-ethinyl estradiol (MICROGESTIN 1.5/30) 1.5-30 MG-MCG tablet  tretinoin (RETIN-A) 0.025 % external cream  triamcinolone (KENALOG) 0.1 % external ointment      Allergies   Allergen Reactions    Clindamycin      Family History  Family History   Problem Relation Age of Onset    Other - See Comments Mother         heavy menstrual cycles    Hashimoto's thyroiditis Mother     Lupus Father     Lupus Maternal Aunt     Leukemia Maternal Grandmother     Cancer No family hx of         Scooter's Syndrome     Social History   Social History     Tobacco Use    Smoking status: Never    Smokeless tobacco: Never   Vaping Use    Vaping status: Never Used   Substance Use Topics    Alcohol use: Yes     Comment: occasionally    Drug use: No      Past medical history, past surgical history, medications, allergies, family history, and social history were reviewed with the patient. No additional pertinent items.   A medically appropriate review of systems was performed with pertinent positives and negatives noted in the HPI, and all other systems negative.    Physical Exam   BP: 128/84  Pulse: 75  Temp: 98.5  F (36.9  C)  Resp: 16  SpO2: 100 %  Physical Exam  Vitals and nursing note reviewed.   Constitutional:       General: She is not in acute distress.     Appearance: She is well-developed. She is not diaphoretic.   HENT:      Head: Normocephalic and atraumatic.      Mouth/Throat:      Pharynx: No oropharyngeal exudate.   Eyes:      General: No scleral icterus.        Right eye: No discharge.         Left eye: No discharge.      Pupils: Pupils are equal, round, and reactive to light.    Cardiovascular:      Rate and Rhythm: Normal rate and regular rhythm.      Heart sounds: Normal heart sounds. No murmur heard.     No friction rub. No gallop.   Pulmonary:      Effort: Pulmonary effort is normal. No respiratory distress.      Breath sounds: Examination of the right-lower field reveals rales. Examination of the left-lower field reveals rales. Rales present. No wheezing.   Chest:      Chest wall: No tenderness.   Abdominal:      General: Bowel sounds are normal. There is no distension.      Palpations: Abdomen is soft.      Tenderness: There is no abdominal tenderness.   Musculoskeletal:         General: No tenderness or deformity. Normal range of motion.      Cervical back: Normal range of motion and neck supple.   Skin:     General: Skin is warm and dry.      Coloration: Skin is not pale.      Findings: No erythema or rash.   Neurological:      Mental Status: She is alert and oriented to person, place, and time.      Cranial Nerves: No cranial nerve deficit.           ED Course, Procedures, & Data      Procedures            EKG Interpretation:      Interpreted by Pedro Quijano DO  Time reviewed: 2025  Symptoms at time of EKG: shortness of breath   Rhythm: normal sinus   Rate: 77  Axis: normal  Ectopy: none  Conduction: normal  ST Segments/ T Waves: No ST-T wave changes  Q Waves: none  Comparison to prior: No old EKG available    Clinical Impression: normal EKG           No results found for any visits on 07/17/24.  Medications - No data to display  Labs Ordered and Resulted from Time of ED Arrival to Time of ED Departure - No data to display  No orders to display              Assessment & Plan    This is a 39-year-old female presents with 3 weeks of shortness of breath cough and chest pain.  Patient was found to have large pericardial cyst on CT that was done today.  On exam patient has coarse lung sounds in the lower lobes bilaterally.  ECG shows no acute abnormalities.  Lab work shows  leukocytosis of 11.5.  I discussed the case with Cardiac Surgery who recommends echo and admission.  I discussed case with Cardiology who did bedside echo, they note technically difficult but biventricular function looks normal.  They recommend admission for formal echo.  We will admit for further monitoring workup and treatment.    I have reviewed the nursing notes. I have reviewed the findings, diagnosis, plan and need for follow up with the patient.    New Prescriptions    No medications on file       Final diagnoses:   None       Pedro Quijano DO  [facility] EMERGENCY DEPARTMENT  7/17/2024     Pedro Quijano,   07/18/24 0221

## 2024-07-18 NOTE — H&P
[facility]    History and Physical - Hospitalist Service, GOLD TEAM        Date of Admission:  7/17/2024    Assessment & Plan      Shayla Mcclure is a 39 year old female admitted on 7/17/2024. She is otherwise healthy presented to the ED with a cough, shortness of breath, and chest pain, and was found to have a pericardial cyst on a CT chest scan.    Pericardial Cyst  Cough  CT chest showed pericardial cyst measuring 9.5 x 5.2 x 5.2 cm without internal nodularity. EKG sinus rhythm. Afebrile, normotensive, not tachycardic, saturating well on room air. BMP unremarkable. Pro-BNP of 44, Troponin < 6. Bedside echo per cardiology, technically difficult but likely normal ventricular function.  - No need to continue steroid started outpatient. Symptomatic tx for cough  - Echocardiogram  - Appreciate Cardiothoracic and Cardiology input       Right knee medial meniscus tear vs root teat equivalent   Right knee patellofemoral degenerative joint disease   Stable. Follows with orthopedic with knee injections.       Diet: Combination Diet Regular Diet Adult    DVT Prophylaxis: Ambulate every shift  Paulson Catheter: Not present  Lines: None     Cardiac Monitoring: None  Code Status: Full Code      Clinically Significant Risk Factors Present on Admission                                         Disposition Plan     Medically Ready for Discharge: Anticipated in 5+ Days           Marquita Gill MD  Hospitalist Service, GOLD TEAM   [facility]  Securely message with [employer] (more info)  Text page via ipsy Paging/Directory   See signed in provider for up to date coverage information    ______________________________________________________________________    Chief Complaint   Cough, chest pain    History is obtained from the patient    History of Present Illness   Shayla Mcclure is a 39 year old female ho patellofemoral degenerative joint disease  otherwise healthy, presented to the ED with a cough, shortness of breath, and chest pain, and was found to have a pericardial cyst on a CT chest scan.  For the past three weeks, the patient has experienced progressive shortness of breath and a cough. She also reports occasional sharp chest pain radiating from her sternum bilaterally through her ribs to her back. Her sleep has worsened, and she wakes up more groggy than usual. Her symptoms are aggravated by lying flat or exertion. Initially, she thought these symptoms were due to an upper respiratory infection and consulted her PCP, who ordered a chest X-ray that revealed the cyst. She then underwent a CT scan of her chest and presented to [facility] ED for further evaluation. She denies having fevers, chills, night sweats, unexpected weight loss, hemoptysis, or taking any blood thinners. She has had breast augmentation surgery but no other chest surgeries. She denies any family history of bleeding or clotting disorders.  In the ED, she was afebrile, normotensive, not tachycardic, saturating well on room air, and appeared non-toxic. Her BMP was largely unremarkable, with a Pro-BNP of 44, Troponin < 6, WBC of 11.5, hemoglobin of 13.8, and platelet count of 335. A CT chest with contrast performed today showed a large right-sided pericardial cyst measuring 9.5 x 5.2 x 5.2 cm without internal nodularity. EKG revealed sinus rhythm.      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    History of severe pre-eclampsia     delivered at 27w3d    Leiomyoma     prior laparotomy for two large fibroids        Past Surgical History   Past Surgical History:   Procedure Laterality Date    C/SECTION, LOW TRANSVERSE      delivered at 27w3d for pre-eclampsia with severe features     COSMETIC MAMMOPLASTY AUGMENTATION BILATERAL      when 16 years old    DILATION AND CURETTAGE SUCTION N/A 04/16/2020    Procedure: DILATION AND  CURETTAGE, UTERUS, USING SUCTION;  Surgeon: Haydee Mcpherson DO;  [facility]    DILATION AND CURETTAGE SUCTION  09/23/2021    repeat procedure for retained POC after prior suction D&C 09/20/2021    DILATION AND CURETTAGE SUCTION N/A 9/20/2021    Procedure: DILATION AND CURETTAGE, UTERUS, USING SUCTION;  Surgeon: Haydee Mcpherson DO;  Location: [facility] OR    MYOMECTOMY UTERUS  2012    open myomectomy in [place], reported very large fibroids       Prior to Admission Medications   Prior to Admission Medications   Prescriptions Last Dose Informant Patient Reported? Taking?   adapalene (DIFFERIN) 0.1 % external cream   No No   Sig: Apply a pea-sized amount evenly over the face at nighttime before bed.   cetirizine (ZYRTEC) 10 MG tablet   No No   Sig: Take 1 tablet (10 mg) by mouth daily   doxycycline hyclate (VIBRA-TABS) 100 MG tablet   No No   Sig: Take 1 tablet (100 mg) by mouth 2 times daily for 7 days   hydrOXYzine (ATARAX) 10 MG tablet   No No   Sig: Take 1 tablet (10 mg) by mouth nightly as needed for itching   methylPREDNISolone (MEDROL DOSEPAK) 4 MG tablet therapy pack   No No   Sig: Follow Package Directions   naproxen (NAPROSYN) 500 MG tablet   No No   Sig: Take 1 tablet (500 mg) by mouth 2 times daily (with meals)   norethindrone-ethinyl estradiol (MICROGESTIN 1.5/30) 1.5-30 MG-MCG tablet   No No   Sig: Take 1 tablet by mouth daily   Patient not taking: Reported on 7/13/2024   tretinoin (RETIN-A) 0.025 % external cream   No No   Sig: Apply a pea-sized amount evenly over face at nighttime before bed   triamcinolone (KENALOG) 0.1 % external ointment   No No   Sig: Apply twice daily as needed for hives      Facility-Administered Medications Last Administration Doses Remaining   lidocaine (PF) 0.5 % injection SOLN 8 mL 3/18/2024 10:46 AM    triamcinolone (KENALOG-40) injection 40 mg 3/18/2024 10:46 AM            Review of Systems    The 10 point Review of Systems is negative other than noted in the HPI or  here.     Social History   I have reviewed this patient's social history and updated it with pertinent information if needed.  Social History     Tobacco Use    Smoking status: Never    Smokeless tobacco: Never   Vaping Use    Vaping status: Never Used   Substance Use Topics    Alcohol use: Yes     Comment: occasionally    Drug use: No         Family History   I have reviewed this patient's family history and updated it with pertinent information if needed.  Family History   Problem Relation Age of Onset    Other - See Comments Mother         heavy menstrual cycles    Hashimoto's thyroiditis Mother     Lupus Father     Lupus Maternal Aunt     Leukemia Maternal Grandmother     Cancer No family hx of         Scooter's Syndrome         Allergies   Allergies   Allergen Reactions    Clindamycin         Physical Exam   Vital Signs: Temp: 98.2  F (36.8  C) Temp src: Oral BP: 111/80 Pulse: 75   Resp: 18 SpO2: 96 % O2 Device: None (Room air)    Weight: 0 lbs 0 oz        Medical Decision Making       56 MINUTES SPENT BY ME on the date of service doing chart review, history, exam, documentation & further activities per the note.          Data     I have personally reviewed the following data over the past 24 hrs:    11.5 (H)  \   13.8   / 335     138 103 19.5 /  96   4.0 24 0.56 \     ALT: 36 AST: 17 AP: 63 TBILI: <0.2   ALB: 4.1 TOT PROTEIN: 6.8 LIPASE: N/A     Trop: <6 BNP: 44       Imaging results reviewed over the past 24 hrs:   Recent Results (from the past 24 hour(s))   CT Chest w Contrast    Narrative    CT CHEST WITH CONTRAST July 17, 2024 2:50 PM    CLINICAL HISTORY: Abnormal chest x-ray.    TECHNIQUE: CT chest with IV contrast. Multiplanar reformats were  obtained. Dose reduction techniques were used.  CONTRAST: 70mL Isovue-370.    COMPARISON: Chest x-ray 7/13/2024.    FINDINGS:   LUNGS AND PLEURA: No effusion or acute airspace consolidation. No  focal worrisome airspace disease.    MEDIASTINUM/AXILLAE: There is a  large right-sided pericardial cyst  anteriorly that is 9.5 x 5.2 x 5.2 cm series 3 image 91. This is  partially obscured by streak artifact but no visible internal  nodularity can be seen. Remainder of the mediastinum shows no acute  abnormality. No adenopathy.    CORONARY ARTERY CALCIFICATION: None.    UPPER ABDOMEN: Upper abdomen shows no significant abnormality.    MUSCULOSKELETAL: No aggressive bone lesion. Bilateral breast implants.      Impression    IMPRESSION:   1.  Prominent right pericardial cyst accounting for the chest x-ray  abnormality. This measures up to 9.5 cm in maximal size.  2.  No other significant abnormality.    SHAMIKA SCHUMACHER MD         SYSTEM ID:  [id number]     Recent Labs   Lab 07/17/24  2046   WBC 11.5*   HGB 13.8   MCV 89         POTASSIUM 4.0   CHLORIDE 103   CO2 24   BUN 19.5   CR 0.56   ANIONGAP 11   KASSANDRA 8.7*   GLC 96   ALBUMIN 4.1   PROTTOTAL 6.8   BILITOTAL <0.2   ALKPHOS 63   ALT 36   AST 17

## 2024-07-18 NOTE — CONSULTS
[facility]    Consult  - CVTS (Cardiothroacic Surgery) Service       Date of Admission:  7/17/2024   on * No surgery found *  Assessment & Plan: Surgery   Shayla Mcclure is a 39 year old female with history of intramural leiomyoma of the uterus who presented to the ED secondary to cough, shortness of breath, and chest pain found to have a pericardial cyst on CT Chest.  -- No emergent surgical intervention  -- Admit to medicine   -- Formal TTE   -- Recommend cardiology consultation  -- Further surgical planning pending results and discussion with staff     The patient's care was discussed with the fellow who discussed with staff    Teja Louise MD  [facility]  All communications related to this note should be expressed to resident/CITLALI on call for this team at the time of your communication.  ______________________________________________________________________    Chief Complaint   Chest pain   Shortness of breath    History is obtained from the patient    History of Present Illness   Shayla Mcclure is a 39 year old female with history of intramural leiomyoma of the uterus who presented to the ED secondary to cough, shortness of breath, and chest pain found to have a pericardial cyst on CT Chest.    Patient states that for the past 3 weeks she has been having progressive shortness of breath and cough and also notes the occasional sharp chest pain that radiates from her sternum bilaterally through her ribs to her back. She has been getting worse sleep and was been waking up more groggy than usual. Her symptoms are exacerbated by lying flat or on exertion. She initially thought that these symptoms were secondary to an URI and presented to her PCP who ordered a CXR which is how the cyst was discovered. She then proceeded to have a CT scan of her chest and presented to [facility] ED  for further evaluation. She denies fevers, chills,  night sweats, unexpected weight loss, hemoptysis, or taking any blood thinners. She has had a breast augmentation surgery however no other surgeries on her chest. She denies a family history of bleeding or clotting disorders.     On presentation in the ED she is afebrile, normotensive, not tachycardic, saturating well on room air, and non-toxic appearing. Her BMP is largely unremarkable, with a Pro-BNP of 44, Trop < 6, WBC of 11.5, hemoglobin of 13.8, and platelet count of 335. Radiographic findings with a CT Chest with contrast performed today are showing a large right-sided pericardial cyst measuring 9.5 x 5.2 x 5.2 cm without internal nodularity. EKG showing sinus rhythm.       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    History of severe pre-eclampsia     delivered at 27w3d    Leiomyoma     prior laparotomy for two large fibroids        Past Surgical History   Past Surgical History:   Procedure Laterality Date    C/SECTION, LOW TRANSVERSE      delivered at 27w3d for pre-eclampsia with severe features     COSMETIC MAMMOPLASTY AUGMENTATION BILATERAL      when 16 years old    DILATION AND CURETTAGE SUCTION N/A 04/16/2020    Procedure: DILATION AND CURETTAGE, UTERUS, USING SUCTION;  Surgeon: Haydee Mcpherson DO;  [facility] OR    DILATION AND CURETTAGE SUCTION  09/23/2021    repeat procedure for retained POC after prior suction D&C 09/20/2021    DILATION AND CURETTAGE SUCTION N/A 9/20/2021    Procedure: DILATION AND CURETTAGE, UTERUS, USING SUCTION;  Surgeon: Haydee Mcpherson DO;  Location: [facility] OR    MYOMECTOMY UTERUS  2012    open myomectomy in [place], reported very large fibroids       Prior to Admission Medications   Prior to Admission Medications   Prescriptions Last Dose Informant Patient Reported? Taking?   adapalene (DIFFERIN) 0.1 % external cream   No No   Sig: Apply a pea-sized amount evenly over the face at nighttime before bed.    cetirizine (ZYRTEC) 10 MG tablet   No No   Sig: Take 1 tablet (10 mg) by mouth daily   doxycycline hyclate (VIBRA-TABS) 100 MG tablet   No No   Sig: Take 1 tablet (100 mg) by mouth 2 times daily for 7 days   hydrOXYzine (ATARAX) 10 MG tablet   No No   Sig: Take 1 tablet (10 mg) by mouth nightly as needed for itching   methylPREDNISolone (MEDROL DOSEPAK) 4 MG tablet therapy pack   No No   Sig: Follow Package Directions   naproxen (NAPROSYN) 500 MG tablet   No No   Sig: Take 1 tablet (500 mg) by mouth 2 times daily (with meals)   norethindrone-ethinyl estradiol (MICROGESTIN 1.5/30) 1.5-30 MG-MCG tablet   No No   Sig: Take 1 tablet by mouth daily   Patient not taking: Reported on 7/13/2024   tretinoin (RETIN-A) 0.025 % external cream   No No   Sig: Apply a pea-sized amount evenly over face at nighttime before bed   triamcinolone (KENALOG) 0.1 % external ointment   No No   Sig: Apply twice daily as needed for hives      Facility-Administered Medications Last Administration Doses Remaining   lidocaine (PF) 0.5 % injection SOLN 8 mL 3/18/2024 10:46 AM    triamcinolone (KENALOG-40) injection 40 mg 3/18/2024 10:46 AM            Review of Systems    The 10 point Review of Systems is negative other than noted in the HPI or here.      Physical Exam   Vital Signs: Temp: 98.5  F (36.9  C) Temp src: Oral BP: 128/84 Pulse: 75   Resp: 16 SpO2: 100 % O2 Device: None (Room air)    Weight: 0 lbs 0 ozNo intake or output data in the 24 hours ending 07/17/24 2317     Gen: Alert and conversational adult in NAD  Chest: breathing non-labored on room air   , clear to auscultation bilaterally, no wheezes or crackles Normal rate, regular rhythm and by radial palpation  Abdomen: Soft, Non-tender  Extremities: Warm well perfused, no bilateral lower extremity edema      Data     I have personally reviewed the following data over the past 24 hrs:    11.5 (H)  \   13.8   / 335     138 103 19.5 /  96   4.0 24 0.56 \     ALT: 36 AST: 17 AP: 63  TBILI: <0.2   ALB: 4.1 TOT PROTEIN: 6.8 LIPASE: N/A     Trop: <6 BNP: 44       Imaging results reviewed over the past 24 hrs:   Recent Results (from the past 24 hour(s))   CT Chest w Contrast    Narrative    CT CHEST WITH CONTRAST July 17, 2024 2:50 PM    CLINICAL HISTORY: Abnormal chest x-ray.    TECHNIQUE: CT chest with IV contrast. Multiplanar reformats were  obtained. Dose reduction techniques were used.  CONTRAST: 70mL Isovue-370.    COMPARISON: Chest x-ray 7/13/2024.    FINDINGS:   LUNGS AND PLEURA: No effusion or acute airspace consolidation. No  focal worrisome airspace disease.    MEDIASTINUM/AXILLAE: There is a large right-sided pericardial cyst  anteriorly that is 9.5 x 5.2 x 5.2 cm series 3 image 91. This is  partially obscured by streak artifact but no visible internal  nodularity can be seen. Remainder of the mediastinum shows no acute  abnormality. No adenopathy.    CORONARY ARTERY CALCIFICATION: None.    UPPER ABDOMEN: Upper abdomen shows no significant abnormality.    MUSCULOSKELETAL: No aggressive bone lesion. Bilateral breast implants.      Impression    IMPRESSION:   1.  Prominent right pericardial cyst accounting for the chest x-ray  abnormality. This measures up to 9.5 cm in maximal size.  2.  No other significant abnormality.    SHAMIKA SCHUMACHER MD         SYSTEM ID:  [id number]

## 2024-07-18 NOTE — MEDICATION SCRIBE - ADMISSION MEDICATION HISTORY
Medication Scribe Admission Medication History    Admission medication history is complete. The information provided in this note is only as accurate as the sources available at the time of the update.    Information Source(s): Patient and CareEverywhere/SureScripts via in-person    Pertinent Information: Patient stated she's only taking the 3 medications listed below.    Changes made to PTA medication list:  Added: None  Deleted: Not using/taking: adapalene cream, cetrizine 10 mg, hydroxyzine 10 mg, naproxen 500 mg, norethindrone-ethinyl estradiol, triamcinolone ointment.  Changed: None    Allergies reviewed with patient and updates made in EHR: yes    Medication History Completed By: Martine Carballo 7/18/2024 8:57 AM    Current Facility-Administered Medications for the 7/17/24 encounter (Hospital Encounter)   Medication    lidocaine (PF) 0.5 % injection SOLN 8 mL    triamcinolone (KENALOG-40) injection 40 mg     PTA Med List   Medication Sig Last Dose    doxycycline hyclate (VIBRA-TABS) 100 MG tablet Take 1 tablet (100 mg) by mouth 2 times daily for 7 days 7/17/2024 at PM    methylPREDNISolone (MEDROL DOSEPAK) 4 MG tablet therapy pack Follow Package Directions 7/17/2024 at PM    tretinoin (RETIN-A) 0.025 % external cream Apply a pea-sized amount evenly over face at nighttime before bed 7/16/2024 at PM

## 2024-07-18 NOTE — PROGRESS NOTES
[facility]    Cardiology Consult Note-Cards 1    Date of Admission:  7/17/2024  Consult Requested by: Marquita Gill MD   Reason for Consult: Pericardial Cyst    Assessment & Plan: HVSL   Shayla Mcclure is a 39 year old female admitted on 7/17/2024 for pericardial cyst. She is otherwise healthy. Patient initially presented to PCP given several weeks of shortness of breath and chest pain with CXR revealing pericardial cyst. Troponin is negative, BNP normal, EKG normal here. CRP was checked to assess for potential pericarditis as a cause of this pericardial cyst, but was negative and it is plausible that this is congenital. Echocardiogram reveals normal cardiac function with an EF of 60-65%. There is no evidence of obstruction or RV dysfunction on echocardiogram or on physical exam. Given the clinical description and exertional nature of her chest pain, will obtain coronary CTA to more definitely rule out coronary disease as a potential cause. Further decision regarding pericardial cyst intervention would be up to CT Surgery.    # Pericardial Cyst, 9.5cm x 5.2cm x 5.2cm  # Chest pain    Recommendations:   - Coronary CTA tomorrow   - Would continue to address with surgery about potential options     The patient's care was discussed with the Attending Physician, Dr. Le .    Davina Dominguez, MS4  [facility]    I saw this patient together with medical student/resident, and performed an independent exam at the same time which confirmed findings. I have edited this note as appropriate to reflect our joint assessment/plan. Patient was also seen and discussed with attending physician, Dr. Le, who is in agreement with above.     Sreedhar Alonzo  Cardiology Fellow      Clinically Significant Risk Factors Present on Admission                                     _____________________________________________________________________    Chief Complaint   Shortness of breath and chest pain    History is obtained from the patient and chart review.    History of Present Illness   Shayla Mcclure is a 39 year old female who presented for three weeks of chest pain, shortness of breath, and cough. Symptoms first began three and a half weeks ago and have been constant since onset. The chest pain is sharp and radiates to her sternum, through her ribs, and to her back. This is exacerbated upon exertion or laying down, and leaning forward also causes a sensation of chest heaviness. Her cough is nonproductive. Shortness of breath is present on exertion and she also endorses orthopnea. She presented to her PCP given these symptoms who ordered CXR which revealed the pericardial cyst, then getting CT scan of her chest to further visualize this. Cardiology was consulted given this pericardial cyst.     She did have a two day episode of bilateral lower extremity swelling in April 2024 that spontaneously resolved and has not since recurred. She denies any palpitations or personal cardiac history including MI, pericarditis, or procedures. She has no family history of heart disease or pericardial cyst.    XR Chest 2 Views from 07/13/2024:  IMPRESSION: There is an ovoid mass along the right heart border measuring approximately 5 x 6 x 5 cm which is indeterminate. Further evaluation with chest CT with IV contrast is recommended. Lungs are otherwise clear. No effusions or pneumothorax. Heart   size is normal. No acute osseous findings.    CT Chest with Contrast from 07/17/2024:  IMPRESSION:   1.  Prominent right pericardial cyst accounting for the chest x-ray  abnormality. This measures up to 9.5 cm in maximal size.  2.  No other significant abnormality.      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    History of severe  pre-eclampsia     delivered at 27w3d    Leiomyoma     prior laparotomy for two large fibroids        Past Surgical History   Past Surgical History:   Procedure Laterality Date    C/SECTION, LOW TRANSVERSE      delivered at 27w3d for pre-eclampsia with severe features     COSMETIC MAMMOPLASTY AUGMENTATION BILATERAL      when 16 years old    DILATION AND CURETTAGE SUCTION N/A 04/16/2020    Procedure: DILATION AND CURETTAGE, UTERUS, USING SUCTION;  Surgeon: Haydee Mcpherson DO;  [facility]: [facility]    DILATION AND CURETTAGE SUCTION  09/23/2021    repeat procedure for retained POC after prior suction D&C 09/20/2021    DILATION AND CURETTAGE SUCTION N/A 9/20/2021    Procedure: DILATION AND CURETTAGE, UTERUS, USING SUCTION;  Surgeon: Haydee Mcpherson DO;  Location: [facility] OR    MYOMECTOMY UTERUS  2012    open myomectomy in [place], reported very large fibroids       Medications   I have reviewed this patient's current medications      Review of Systems    The 10 point Review of Systems is negative other than noted in the HPI or here.     Additional family and social history in HPI as pertinent.      Physical Exam   Vital Signs: Temp: 98.2  F (36.8  C) Temp src: Oral BP: 111/80 Pulse: 75   Resp: 18 SpO2: 97 % O2 Device: None (Room air)    Weight: 0 lbs 0 oz    General: Patient is in no acute distress. Sitting up in bed.  HEENT: No scleral icterus. EOMs intact.   Resp: No increased work of breathing. Lungs with coarse breath sounds at the bases. No cyanosis.    Cardio: Regular rate and Rhythm rhythm. Regular S1 and S2 without S3 or S4. No murmurs.. No JVD, no hepatojugular reflux. There is no lower extremity edema.   Abd/: Abdomen is Nondistended.   MSK: No gross deformities.   Neuro/Psych: Patient is alert and comfortably conversant.     Medical Decision Making         Data     I have personally reviewed the following data over the past 24 hrs:    11.5 (H)  \   13.8   / 335     138 103 19.5 /  96   4.0 24 0.56 \     ALT: 36  AST: 17 AP: 63 TBILI: <0.2   ALB: 4.1 TOT PROTEIN: 6.8 LIPASE: N/A     Trop: <6 BNP: 44         EKG from 07/17:      Echocardiogram from 07/18/2024:  Interpretation Summary  Global and regional left ventricular function is normal with an EF of 60-65%.  Global right ventricular function is normal.  No significant valvular abnormalities present.  Pulmonary artery systolic pressure is normal.  Estimated mean right atrial pressure is normal.  No pericardial effusion is present.

## 2024-07-18 NOTE — ED TRIAGE NOTES
Pt reports having mild chest pain and SOB with exertion for a few weeks now. States she started having a cough 1 week ago. Had a CT scan of her chest today, with findings consistent for a pericardial cyst.     Triage Assessment (Adult)       Row Name 07/17/24 2006          Triage Assessment    Airway WDL WDL        Respiratory WDL    Respiratory WDL X  reported SOB w exertion        Cardiac WDL    Cardiac WDL X;chest pain

## 2024-07-18 NOTE — PROGRESS NOTES
BRIEF CVTS PROGRESS NOTE    S:  Shayla Mcclure is a 39 year old female with history of intramural leiomyoma of the uterus who was referred to the ED by her PCP secondary to cough, shortness of breath, and chest pain with pericardial cyst identified on CT chest. CV Surgery was consulted for consideration of possible surgical excision.    O:  /83   Pulse 72   Temp 97.7  F (36.5  C) (Oral)   Resp 18   SpO2 100%     CBC RESULTS:   Recent Labs   Lab Test 07/17/24  2046 04/26/22  1609 09/23/21  0045   WBC 11.5* 7.8 14.1*   HGB 13.8 13.8 13.3   HCT 42.0 41.8 38.0    346 339     CMP RESULTS:  Recent Labs   Lab Test 07/17/24 2046 04/26/22  1609 09/23/21  0045 03/29/20  0240 03/09/20  1055 12/31/19  1151    140 138   < >  --  136   POTASSIUM 4.0 4.2 3.8   < >  --  4.3   CHLORIDE 103 108 109   < >  --  105   CO2 24 27 25   < >  --  29   ANIONGAP 11 5 4   < >  --  2*   GLC 96 94 81   < >  --  77   BUN 19.5 18 16   < >  --  13   CR 0.56 0.79 0.62   < > 0.46* 0.56   GFRESTIMATED >90 >90 >90   < > >90 >90   KASSANDRA 8.7* 9.0 8.8   < >  --  8.5   BILITOTAL <0.2 0.3  --   --   --  0.5   ALKPHOS 63 54  --   --   --  61   ALT 36 50  --   --  46 68*   AST 17 19  --   --  13 30    < > = values in this interval not displayed.     Recent Labs   Lab Test 09/23/21 0045 03/29/20  0240   INR 0.90 0.93       Recent Results (from the past 48 hour(s))   CT Chest w Contrast    Narrative    CT CHEST WITH CONTRAST July 17, 2024 2:50 PM    CLINICAL HISTORY: Abnormal chest x-ray.    TECHNIQUE: CT chest with IV contrast. Multiplanar reformats were  obtained. Dose reduction techniques were used.  CONTRAST: 70mL Isovue-370.    COMPARISON: Chest x-ray 7/13/2024.    FINDINGS:   LUNGS AND PLEURA: No effusion or acute airspace consolidation. No  focal worrisome airspace disease.    MEDIASTINUM/AXILLAE: There is a large right-sided pericardial cyst  anteriorly that is 9.5 x 5.2 x 5.2 cm series 3 image 91. This is  partially obscured by  streak artifact but no visible internal  nodularity can be seen. Remainder of the mediastinum shows no acute  abnormality. No adenopathy.    CORONARY ARTERY CALCIFICATION: None.    UPPER ABDOMEN: Upper abdomen shows no significant abnormality.    MUSCULOSKELETAL: No aggressive bone lesion. Bilateral breast implants.      Impression    IMPRESSION:   1.  Prominent right pericardial cyst accounting for the chest x-ray  abnormality. This measures up to 9.5 cm in maximal size.  2.  No other significant abnormality.    SHAMIKA SCHUMACHER MD         SYSTEM ID:  [id number]   Echo Complete   Result Value    LVEF  60-65%    [facility]    [id number]  [id number]  [id number]  [id number]^MELISSA^ALVINA     [facility],[place]  Echocardiography Laboratory  [address] [place], [place] [address]     Name: LINDA CAMPOS  MRN: [id number]  : 1985  Study Date: 2024 09:13 AM  Age: 39 yrs  Gender: Female  Patient Location: [facility]  Reason For Study: Other Diseases of Pericardium  Ordering Physician: ALVINA TORRES  Performed By: Marycruz Delong     BSA: 1.6 m2  Height: 60 in  Weight: 144 lb  HR: 73  BP: 114/73 mmHg  ______________________________________________________________________________  Procedure  Echocardiogram with two-dimensional, color and spectral Doppler performed.  ______________________________________________________________________________  Interpretation Summary  Global and regional left ventricular function is normal with an EF of 60-65%.  Global right ventricular function is normal.  No significant valvular abnormalities present.  Pulmonary artery systolic pressure is normal.  Estimated mean right atrial pressure is normal.  No pericardial effusion is present.  ______________________________________________________________________________  Left Ventricle  Left ventricular size is normal. Left ventricular wall thickness is normal.  Global and regional left ventricular function is normal with an  EF of 60-65%.  Left ventricular diastolic function is normal. No regional wall motion  abnormalities are seen.     Right Ventricle  The right ventricle is normal size. Global right ventricular function is  normal.     Atria  Both atria appear normal.     Mitral Valve  The mitral valve is normal.     Aortic Valve  Aortic valve is normal in structure and function.     Tricuspid Valve  The tricuspid valve is normal. Trace to mild tricuspid insufficiency is  present. The right ventricular systolic pressure is approximated at 21.2 mmHg  plus the right atrial pressure. Pulmonary artery systolic pressure is normal.     Pulmonic Valve  The pulmonic valve is normal.     Vessels  The aorta root is normal. The inferior vena cava is normal. Estimated mean  right atrial pressure is normal.     Pericardium  No pericardial effusion is present.     Miscellaneous  No significant valvular abnormalities present.     Compared to Previous Study  Previous study not available for comparison.  ______________________________________________________________________________  MMode/2D Measurements & Calculations  IVSd: 0.89 cm  LVIDd: 3.3 cm  LVIDs: 2.0 cm  LVPWd: 0.99 cm  FS: 40.0 %  LV mass(C)d: 86.7 grams  LV mass(C)dI: 53.4 grams/m2  Ao root diam: 2.5 cm  asc Aorta Diam: 2.4 cm  LVOT diam: 1.8 cm  LVOT area: 2.6 cm2  Ao root diam index Ht(cm/m): 1.6  Ao root diam index BSA (cm/m2): 1.5  Asc Ao diam index BSA (cm/m2): 1.5  Asc Ao diam index Ht(cm/m): 1.6  EF Biplane: 66.5 %  LA Volume (BP): 32.2 ml     LA Volume Index (BP): 19.9 ml/m2  RV Base: 3.2 cm  RWT: 0.60  TAPSE: 2.0 cm     Doppler Measurements & Calculations  MV E max usama: 88.6 cm/sec  MV A max usama: 56.6 cm/sec  MV E/A: 1.6  MV dec slope: 493.2 cm/sec2  MV dec time: 0.18 sec  PA acc time: 0.14 sec  TR max usama: 230.0 cm/sec  TR max P.2 mmHg     E/E' av.4  Lateral E/e': 5.7  Medial E/e': 9.0  RV S Usama: 13.4 cm/sec      ______________________________________________________________________________  Report approved by: Jeremiah Duarte 07/18/2024 11:17 AM               A/P:  Shayla Mcclure is a 39 year old female for whom CV Surgery was consulted for symptomatic pericardial cyst.  Will have surgeon review imaging but no indication for emergent surgery given absence of tamponade on echo.  Surgical planning TBD pending additional work-up; may be able to discharge and return for elective surgery if medically stable.  CTA pending per Cardiology.  CV Surgery will continue to follow, please feel free to call/page with questions.    Discussed with Dr. Scherer.    Robby Crawford, CNP, [facility]-  Cardiothoracic Surgery  Pager [phone]

## 2024-07-19 ENCOUNTER — APPOINTMENT (OUTPATIENT)
Dept: CT IMAGING | Facility: CLINIC | Age: 39
DRG: 307 | End: 2024-07-19
Attending: STUDENT IN AN ORGANIZED HEALTH CARE EDUCATION/TRAINING PROGRAM
Payer: COMMERCIAL

## 2024-07-19 VITALS
SYSTOLIC BLOOD PRESSURE: 108 MMHG | HEART RATE: 64 BPM | RESPIRATION RATE: 18 BRPM | OXYGEN SATURATION: 97 % | DIASTOLIC BLOOD PRESSURE: 73 MMHG | TEMPERATURE: 98.6 F

## 2024-07-19 LAB
HOLD SPECIMEN: NORMAL
MAGNESIUM SERPL-MCNC: 3.6 MG/DL (ref 1.7–2.3)
POTASSIUM SERPL-SCNC: 3.8 MMOL/L (ref 3.4–5.3)
POTASSIUM SERPL-SCNC: 4 MMOL/L (ref 3.4–5.3)

## 2024-07-19 PROCEDURE — 36415 COLL VENOUS BLD VENIPUNCTURE: CPT | Performed by: HOSPITALIST

## 2024-07-19 PROCEDURE — 99231 SBSQ HOSP IP/OBS SF/LOW 25: CPT | Performed by: SURGERY

## 2024-07-19 PROCEDURE — 84132 ASSAY OF SERUM POTASSIUM: CPT | Performed by: HOSPITALIST

## 2024-07-19 PROCEDURE — 75574 CT ANGIO HRT W/3D IMAGE: CPT | Mod: 26 | Performed by: INTERNAL MEDICINE

## 2024-07-19 PROCEDURE — 999N000128 HC STATISTIC PERIPHERAL IV START W/O US GUIDANCE

## 2024-07-19 PROCEDURE — 250N000011 HC RX IP 250 OP 636: Performed by: INTERNAL MEDICINE

## 2024-07-19 PROCEDURE — 99232 SBSQ HOSP IP/OBS MODERATE 35: CPT | Mod: 25 | Performed by: INTERNAL MEDICINE

## 2024-07-19 PROCEDURE — 99239 HOSP IP/OBS DSCHRG MGMT >30: CPT | Performed by: INTERNAL MEDICINE

## 2024-07-19 PROCEDURE — 75574 CT ANGIO HRT W/3D IMAGE: CPT

## 2024-07-19 PROCEDURE — 250N000013 HC RX MED GY IP 250 OP 250 PS 637: Performed by: STUDENT IN AN ORGANIZED HEALTH CARE EDUCATION/TRAINING PROGRAM

## 2024-07-19 PROCEDURE — 250N000013 HC RX MED GY IP 250 OP 250 PS 637

## 2024-07-19 PROCEDURE — 250N000013 HC RX MED GY IP 250 OP 250 PS 637: Performed by: INTERNAL MEDICINE

## 2024-07-19 RX ORDER — IOPAMIDOL 755 MG/ML
110 INJECTION, SOLUTION INTRAVASCULAR ONCE
Status: COMPLETED | OUTPATIENT
Start: 2024-07-19 | End: 2024-07-19

## 2024-07-19 RX ORDER — METOPROLOL TARTRATE 50 MG
50 TABLET ORAL ONCE
Status: COMPLETED | OUTPATIENT
Start: 2024-07-19 | End: 2024-07-19

## 2024-07-19 RX ORDER — NITROGLYCERIN 0.4 MG/1
.4-.8 TABLET SUBLINGUAL
Status: DISCONTINUED | OUTPATIENT
Start: 2024-07-19 | End: 2024-07-19

## 2024-07-19 RX ORDER — METOPROLOL TARTRATE 25 MG/1
25 TABLET, FILM COATED ORAL ONCE
Status: COMPLETED | OUTPATIENT
Start: 2024-07-19 | End: 2024-07-19

## 2024-07-19 RX ORDER — METHYLPREDNISOLONE SODIUM SUCCINATE 125 MG/2ML
125 INJECTION, POWDER, LYOPHILIZED, FOR SOLUTION INTRAMUSCULAR; INTRAVENOUS
Status: DISCONTINUED | OUTPATIENT
Start: 2024-07-19 | End: 2024-07-19

## 2024-07-19 RX ORDER — METOPROLOL TARTRATE 1 MG/ML
5-15 INJECTION, SOLUTION INTRAVENOUS
Status: DISCONTINUED | OUTPATIENT
Start: 2024-07-19 | End: 2024-07-19

## 2024-07-19 RX ORDER — IVABRADINE 5 MG/1
10 TABLET, FILM COATED ORAL ONCE
Status: COMPLETED | OUTPATIENT
Start: 2024-07-19 | End: 2024-07-19

## 2024-07-19 RX ADMIN — ACETAMINOPHEN 650 MG: 325 TABLET, FILM COATED ORAL at 11:55

## 2024-07-19 RX ADMIN — METOPROLOL TARTRATE 25 MG: 25 TABLET, FILM COATED ORAL at 09:36

## 2024-07-19 RX ADMIN — NITROGLYCERIN 0.8 MG: 0.4 TABLET SUBLINGUAL at 11:23

## 2024-07-19 RX ADMIN — METOPROLOL TARTRATE 50 MG: 50 TABLET, FILM COATED ORAL at 08:50

## 2024-07-19 RX ADMIN — IVABRADINE 10 MG: 5 TABLET, FILM COATED ORAL at 09:36

## 2024-07-19 RX ADMIN — IOPAMIDOL 110 ML: 755 INJECTION, SOLUTION INTRAVENOUS at 11:14

## 2024-07-19 ASSESSMENT — ACTIVITIES OF DAILY LIVING (ADL)
ADLS_ACUITY_SCORE: 18
ADLS_ACUITY_SCORE: 18
ADLS_ACUITY_SCORE: 19
ADLS_ACUITY_SCORE: 18
ADLS_ACUITY_SCORE: 19
ADLS_ACUITY_SCORE: 18
ADLS_ACUITY_SCORE: 19

## 2024-07-19 NOTE — PROGRESS NOTES
BRIEF CVTS PROGRESS NOTE    S:  Linda Campos is a 39 year old female with history of intramural leiomyoma of the uterus who was referred to the ED by her PCP secondary to cough, shortness of breath, and chest pain with pericardial cyst identified on CT chest. CV Surgery was consulted for consideration of possible surgical excision.    O:  /73   Pulse 64   Temp 98.6  F (37  C) (Oral)   Resp 18   SpO2 97%     CBC RESULTS:   Recent Labs   Lab Test 24   WBC 11.5* 7.8 14.1*   HGB 13.8 13.8 13.3   HCT 42.0 41.8 38.0    346 339     CMP RESULTS:  Recent Labs   Lab Test 2457 24  0045 20  0240 20  1055 19  1151   NA  --   --  138 140 138   < >  --  136   POTASSIUM 4.0 3.8 4.0 4.2 3.8   < >  --  4.3   CHLORIDE  --   --  103 108 109   < >  --  105   CO2  --   --  24 27 25   < >  --  29   ANIONGAP  --   --  11 5 4   < >  --  2*   GLC  --   --  96 94 81   < >  --  77   BUN  --   --  19.5 18 16   < >  --  13   CR  --   --  0.56 0.79 0.62   < > 0.46* 0.56   GFRESTIMATED  --   --  >90 >90 >90   < > >90 >90   KASASNDRA  --   --  8.7* 9.0 8.8   < >  --  8.5   BILITOTAL  --   --  <0.2 0.3  --   --   --  0.5   ALKPHOS  --   --  63 54  --   --   --  61   ALT  --   --  36 50  --   --  46 68*   AST  --   --  17 19  --   --  13 30    < > = values in this interval not displayed.     Recent Labs   Lab Test 21  0240   INR 0.90 0.93       Recent Results (from the past 48 hour(s))   Echo Complete   Result Value    LVEF  60-65%    [facility]    [id number]  [id number]  [id number]  [id number]^MELISSA^ALVINA     [facility],[place]  Echocardiography Laboratory  [address] [place] [id number]     Name: LINDA CAMPOS  MRN: [id number]  : 1985  Study Date: 2024 09:13 AM  Age: 39 yrs  Gender: Female  Patient Location: [facility]  Reason For Study: Other Diseases  of Pericardium  Ordering Physician: ALVINA TORRES  Performed By: Marycruz Delong     BSA: 1.6 m2  Height: 60 in  Weight: 144 lb  HR: 73  BP: 114/73 mmHg  ______________________________________________________________________________  Procedure  Echocardiogram with two-dimensional, color and spectral Doppler performed.  ______________________________________________________________________________  Interpretation Summary  Global and regional left ventricular function is normal with an EF of 60-65%.  Global right ventricular function is normal.  No significant valvular abnormalities present.  Pulmonary artery systolic pressure is normal.  Estimated mean right atrial pressure is normal.  No pericardial effusion is present.  ______________________________________________________________________________  Left Ventricle  Left ventricular size is normal. Left ventricular wall thickness is normal.  Global and regional left ventricular function is normal with an EF of 60-65%.  Left ventricular diastolic function is normal. No regional wall motion  abnormalities are seen.     Right Ventricle  The right ventricle is normal size. Global right ventricular function is  normal.     Atria  Both atria appear normal.     Mitral Valve  The mitral valve is normal.     Aortic Valve  Aortic valve is normal in structure and function.     Tricuspid Valve  The tricuspid valve is normal. Trace to mild tricuspid insufficiency is  present. The right ventricular systolic pressure is approximated at 21.2 mmHg  plus the right atrial pressure. Pulmonary artery systolic pressure is normal.     Pulmonic Valve  The pulmonic valve is normal.     Vessels  The aorta root is normal. The inferior vena cava is normal. Estimated mean  right atrial pressure is normal.     Pericardium  No pericardial effusion is present.     Miscellaneous  No significant valvular abnormalities present.     Compared to Previous Study  Previous study not available for  comparison.  ______________________________________________________________________________  MMode/2D Measurements & Calculations  IVSd: 0.89 cm  LVIDd: 3.3 cm  LVIDs: 2.0 cm  LVPWd: 0.99 cm  FS: 40.0 %  LV mass(C)d: 86.7 grams  LV mass(C)dI: 53.4 grams/m2  Ao root diam: 2.5 cm  asc Aorta Diam: 2.4 cm  LVOT diam: 1.8 cm  LVOT area: 2.6 cm2  Ao root diam index Ht(cm/m): 1.6  Ao root diam index BSA (cm/m2): 1.5  Asc Ao diam index BSA (cm/m2): 1.5  Asc Ao diam index Ht(cm/m): 1.6  EF Biplane: 66.5 %  LA Volume (BP): 32.2 ml     LA Volume Index (BP): 19.9 ml/m2  RV Base: 3.2 cm  RWT: 0.60  TAPSE: 2.0 cm     Doppler Measurements & Calculations  MV E max usama: 88.6 cm/sec  MV A max usama: 56.6 cm/sec  MV E/A: 1.6  MV dec slope: 493.2 cm/sec2  MV dec time: 0.18 sec  PA acc time: 0.14 sec  TR max usama: 230.0 cm/sec  TR max P.2 mmHg     E/E' av.4  Lateral E/e': 5.7  Medial E/e': 9.0  RV S Usama: 13.4 cm/sec     ______________________________________________________________________________  Report approved by: Jeremiah Duarte 2024 11:17 AM         CT Angiogram coronary artery    Narrative    Procedure: CTA ANGIOGRAM CORONARY ARTERY   Examination Date: 2024 11:34 AM   Indication: Coronary evaluation   Ordering Provider: Clinton  Overall quality of the study: Good.   Comparison: Prior CT dated 2024.    PROCEDURE: ECG gated multi-slice computed tomography of the heart with  and without intravenous contrast  (Isovue 370, 120 mL at rate of 5.5  mL/sec) was performed on a Siemens Dual Source Flash scanner without  incident. Medical therapy was used to optimize heart rate (metoprolol  50 mg oral, ivabradine 10 oral). Sublingual Nitrostat 0.8 mg was given  prior to scanning. Coronary artery calcium scoring was performed using  the Flash scanner protocol. The CTA portion was performed in the  spiral mode at a heart rate of 55 bpm with 100 kVp. Images were  reconstructed and analyzed on a [employer] workstation. The  scan  protocol was optimized to minimize radiation exposure. The total  radiation exposure, including calcium artery calcium scoring, was  calculated to be 293 DLP and 4.1 mSv.        Impression    IMPRESSION:  1.  Normal coronary arteries without detectable atherosclerosis or  stenosis.  2.  Total Agatston score 0 placing the patient in the lowest  percentile when compared to an age- and gender-matched control group.  3.  Known right-sided pericardial cyst, unchanged from the prior  non-cardiac CT dated 2024.  4.  Please review the separate Radiology report for incidental  noncardiac findings.    FINDINGS:    CORONARY CALCIUM SCORE    Total Agatston calcium score: 0   Left main: 0  Left anterior descendin  Left circumflex: 0  Right coronary artery: 0   This places the patient in the lowest  percentile when compared to an  age- and gender-matched control group.    CORONARY ANGIOGRAPHY    DOMINANCE: Right dominant system.   Normal coronary origins and course.    LEFT MAIN:   The LM is a medium caliber vessel.   The left main arises normally from the left coronary cusp and is  widely patent without detectable atherosclerosis or stenosis.     LEFT ANTERIOR DESCENDING:   The LAD is a medium caliber vessel. It has a type III morphology. It  gives rise to two diagonal branches.   The left anterior descending and its major diagonal branches are  widely patent without any detectable stenosis.    LEFT CIRCUMFLEX:   The LCx is a medium caliber vessel. It gives rise to two obtuse  marginal branches .   The left circumflex and its major branches are widely patent without  detectable atherosclerosis or stenosis.    RIGHT CORONARY ARTERY:   The RCA is a small caliber vessel.    The right coronary artery and its major branches are widely patent  without detectable atherosclerosis or stenosis.    ADDITIONAL FINDINGS:     The proximal ascending aorta is normal in size (22.5 mm x 21.9 mm).   Known large pericardial cyst in  the right cardiophrenic recess. The  cyst measures 9.4 cm x 5.5 cm in maximal axial dimensions and 5.7 cm  craniocaudally.  Normal pulmonary venous anatomy. All four pulmonary veins drain into  the left atrium.    There is no left ventricular mass or thrombus.   There is no left atrial appendage thrombus.   Normal pericardial thickness. There is no pericardial effusion.  Please review the separate Radiology report for incidental noncardiac  findings.    I have personally reviewed the examination and initial interpretation  and I agree with the findings.    SAUNDRA ASCENCIO MD         SYSTEM ID:  [id number]   Radiologist Consult For Cardiology    Narrative    EXAMINATION: RADIOLOGIST CONSULT FOR CARDIOLOGY, 7/19/2024 11:34 AM     COMPARISON: CT chest 7/17/2024    HISTORY: Pericardial cyst    TECHNIQUE: Cardiac CT was performed by cardiology. Interpretation of  the noncardiac portions of the study was requested.  Field of view  extending from approximately the karen to the upper abdomen    FINDINGS:   Heart size is within normal limits. Large right-sided anterior  pericardial homogenous collection with simple fluid attenuation  (4/32). Collection measures 9.6 x 5.0 cm in axial plane. No definite  solid component is identified within the area. Normal caliber of the  visualized thoracic aorta and main pulmonary artery. No significant  coronary artery atherosclerotic calcifications. No pericardial  effusion. The visualized mediastinal and hilar lymph nodes are not  enlarged. No central PE.    No pleural effusion or pneumothorax at this level. The visualized  lungs are clear. Bibasilar subsegmental dependent atelectasis. Mild  compressive atelectasis in the right middle lobe just adjacent to the  above described pericardial cyst.    Visualized upper abdomen is unremarkable. No acute osseous lesions of  the visualized thorax. Bilateral breast implants appear intact.      Impression    IMPRESSION:  1. Stable appearance of  the right sided anterior pericardial cyst  measuring 9.6 x 5.0 in greatest transaxial dimension.  2. Please see separate report for the cardiac portions of the study.    I have personally reviewed the examination and initial interpretation  and I agree with the findings.    LEIF RUSS MD         SYSTEM ID:  [id number]         A/P:  Shayla Mcclure is a 39 year old female for whom CV Surgery was consulted for symptomatic pericardial cyst.  Surgeon has reviewed imaging and no indication for emergent surgery; CT cor angio negative for CAD.  Recommend Thoracic Surgery consultation (entered for you) for consideration of possible minimally invasive surgical options.  From a CV Surgery standpoint, Ms. Mcclure could discharge while surgical options are being contemplated.    Discussed with Dr. Scherer.    Robby Crawford, CNP, ACNPC-AG  Cardiothoracic Surgery  Pager [phone]

## 2024-07-19 NOTE — PROGRESS NOTES
"Admitted/transferred from: ED  2 RN full  skin assessment completed by Keyana Gregory RN and Zina WHEATLEY RN.  Skin assessment finding: skin intact, no problems   Interventions/actions: other NA      Bedside Emergency Equipment Present:  Suction Regulator: Yes  Suction Canister: Yes  Tubing between Regulator and Canister: Yes  O2 Regulator with Tree: Yes  Ambu Bag: Yes    Documentation of Language Proficiency Assessment:  Does the patient speak a language other than English at home? Yes   Have they had difficulty understanding or being understood in previous admissions or doctor visits? No   Do they have difficulty clearly explaining what brought them into the hospital? No   Do they show difficulty providing clear teach back about call light use? No     If the answer was \"yes,\" to more than one question, was an  utilized for the remainder of the admission assessment? No    If no, please explain:  NA  "

## 2024-07-19 NOTE — PROGRESS NOTES
/73   Pulse 64   Temp 98.6  F (37  C) (Oral)   Resp 18   SpO2 97%     Day shift July 19, 2024     Activity: Independent, ambulated around room.   Neuros:  A&Ox4, pleasant, makes needs known.   Cardiac: On Tele - Sinus rhythm - Intermittent tachycardia and chest pain.  Respiratory: Stable on room air.   GI/: Voids spontaneously in toilet, not collecting.   Diet: Regular diet.   Skin/Incisions/Drains: R/L PIV flushed 18 g placed for CT scan today. Skin intact.   Lines: R / L PIV s/l  Pain: Reporting intermittent chest pain with increase in heart rate.   Labs:  K and Mg replacement protocols. No replacement needed.     Plan: Went down for CTA. Cardiology consulted, ordered consult for thoracic surgery.

## 2024-07-19 NOTE — CARE PLAN
Pt discharged from unit 7B on 07/192024 at 1620 to home. All discharge instructions were given to the patient and all belongings remained with the patient.

## 2024-07-19 NOTE — CONSULTS
[facility]    Consult Note - Thoracic Surgery Service  Date of Admission:  7/17/2024  Consult Requested by: Dr. Gill  Reason for Consult: pericardial cyst    Assessment & Plan: Surgery   Shayla Mcclure is a 39 year old female admitted on 7/17/2024 with 1 year of intermittent dyspnea on exertion, increased fatigue, and chest pressure. She was found to have a pericardial cyst on CT scan. Coronary angiogram normal (excluding cyst). Patient is hemodynamically normal.     - No emergent surgical intervention needed at this time. Okay for patient to discharge home from our perspective.   - No further imaging or workup needed at this time in regards to cyst  - Will have patient follow up with us in clinic to discuss surgical options       Drains: None     Code Status: Full Code      The patient's care was discussed with the Attending Physician, Dr. Huerta .    Amanda Vang MD  [facility]  Non-urgent messages: Securely message with [employer] (more info)  Text page via [facility] Paging/Directory     STAFF ADDENDUM  I did not see Ms. Mcclure, but reviewed her chart and imaging and discussed her with the referring team and the resident. I'll see her in clinic in 3 weeks._    _____________________________________________________________________    Chief Complaint   Fatigue, cough, dyspnea on exertion     History is obtained from the patient and her     History of Present Illness   Shayla Mcclure is a 39 year old female with a history of a uterine leiomyoma who presented with 1 year of intermittent dyspnea on exertion and 6 months of increasing fatigue. She additionally developed an intermittent dry cough about six months ago. She did not think much of these symptoms, feeling like she was about to get sick (with a cold) but never did. She also started noticing chest pressure, worst when laying flat. She noticed these symptoms getting  significantly worse 3 weeks ago and even worse a few days ago. She can no longer lay flat. She presented to her PCP who ordered a CXR and found a pericardial cyst. She then had a CT scan of her chest and presented to [facility]. This showed a right pericardial cyst measuring 9.5 x 5.2 x 5.2 cm. She denies fevers, chills, night sweats, unexpected weight loss, hemoptysis, or taking any blood thinners. Over the past few days.     Over the past few days, patient continues to feel these symptoms though they are no longer getting worse. She underwent coronary angiogram which was normal with the exception of the known cyst. CVTS recommended thoracic surgery consultation for consideration of possible minimally invasive surgical options.       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    History of severe pre-eclampsia     delivered at 27w3d    Leiomyoma     prior laparotomy for two large fibroids    Leiomyoma, severe pre-eclampsia    Past Surgical History   Past Surgical History:   Procedure Laterality Date    C/SECTION, LOW TRANSVERSE      delivered at 27w3d for pre-eclampsia with severe features     COSMETIC MAMMOPLASTY AUGMENTATION BILATERAL      when 16 years old    DILATION AND CURETTAGE SUCTION N/A 2020    Procedure: DILATION AND CURETTAGE, UTERUS, USING SUCTION;  Surgeon: Haydee Mcpherson DO;  Location:  OR    DILATION AND CURETTAGE SUCTION  2021    repeat procedure for retained POC after prior suction D&C 2021    DILATION AND CURETTAGE SUCTION N/A 2021    Procedure: DILATION AND CURETTAGE, UTERUS, USING SUCTION;  Surgeon: Haydee Mcpherson DO;  Location: [facility] OR    MYOMECTOMY UTERUS  2012    open myomectomy in [place], reported very large fibroids   Myomectomy,  x2  Breast augmentation    Prior to Admission Medications   I have reviewed this patient's current medications . No PTA meds    Social History   Occasional  alcohol (1-2x/month), smoked cigarettes for a few months as a teenager, none since    Family History   I have reviewed this patient's family history and updated it with pertinent information if needed.  Family History   Problem Relation Age of Onset    Other - See Comments Mother         heavy menstrual cycles    Hashimoto's thyroiditis Mother     Lupus Father     Lupus Maternal Aunt     Leukemia Maternal Grandmother     Cancer No family hx of         Scooter's Syndrome   No bleeding or clotting disorders.     Allergies   Allergies   Allergen Reactions    Clindamycin     Clindamycin, reaction: hives    Physical Exam   Vital Signs: Temp: 98.6  F (37  C) Temp src: Oral BP: 108/73 Pulse: 64   Resp: 18 SpO2: 97 % O2 Device: None (Room air)      Intake/Output Summary (Last 24 hours) at 7/19/2024 1519  Last data filed at 7/19/2024 0000  Gross per 24 hour   Intake 120 ml   Output --   Net 120 ml     Gen: Non-toxic appearing, no acute distress  HEENT: Atraumatic, normocephalic  Neuro: Alert and oriented. No gross neurologic deficits   Pulm: nonlabored breathing, comfortable on room air, no cough  CV: RRR by peripheral pulse, noncyanotic   MSK:  Normal active range of motion, no edema  Skin: Warm, dry, no rashes or abrasions on exposed skin  Psych: Cooperative, appropriate mood and affect      Data     I have personally reviewed the following data over the past 24 hrs:    N/A  \   N/A   / N/A     N/A N/A N/A /  N/A   4.0 N/A N/A \       Imaging results reviewed over the past 24 hrs:   Recent Results (from the past 24 hour(s))   CT Angiogram coronary artery    Narrative    Procedure: CTA ANGIOGRAM CORONARY ARTERY   Examination Date: 7/19/2024 11:34 AM   Indication: Coronary evaluation   Ordering Provider: Clinton  Overall quality of the study: Good.   Comparison: Prior CT dated 7/17/2024.    PROCEDURE: ECG gated multi-slice computed tomography of the heart with  and without intravenous contrast  (Isovue 370, 120 mL at rate of  5.5  mL/sec) was performed on a Siemens Dual Source Flash scanner without  incident. Medical therapy was used to optimize heart rate (metoprolol  50 mg oral, ivabradine 10 oral). Sublingual Nitrostat 0.8 mg was given  prior to scanning. Coronary artery calcium scoring was performed using  the Flash scanner protocol. The CTA portion was performed in the  spiral mode at a heart rate of 55 bpm with 100 kVp. Images were  reconstructed and analyzed on a [employer] workstation. The scan  protocol was optimized to minimize radiation exposure. The total  radiation exposure, including calcium artery calcium scoring, was  calculated to be 293 DLP and 4.1 mSv.        Impression    IMPRESSION:  1.  Normal coronary arteries without detectable atherosclerosis or  stenosis.  2.  Total Agatston score 0 placing the patient in the lowest  percentile when compared to an age- and gender-matched control group.  3.  Known right-sided pericardial cyst, unchanged from the prior  non-cardiac CT dated 2024.  4.  Please review the separate Radiology report for incidental  noncardiac findings.    FINDINGS:    CORONARY CALCIUM SCORE    Total Agatston calcium score: 0   Left main: 0  Left anterior descendin  Left circumflex: 0  Right coronary artery: 0   This places the patient in the lowest  percentile when compared to an  age- and gender-matched control group.    CORONARY ANGIOGRAPHY    DOMINANCE: Right dominant system.   Normal coronary origins and course.    LEFT MAIN:   The LM is a medium caliber vessel.   The left main arises normally from the left coronary cusp and is  widely patent without detectable atherosclerosis or stenosis.     LEFT ANTERIOR DESCENDING:   The LAD is a medium caliber vessel. It has a type III morphology. It  gives rise to two diagonal branches.   The left anterior descending and its major diagonal branches are  widely patent without any detectable stenosis.    LEFT CIRCUMFLEX:   The LCx is a medium caliber  vessel. It gives rise to two obtuse  marginal branches .   The left circumflex and its major branches are widely patent without  detectable atherosclerosis or stenosis.    RIGHT CORONARY ARTERY:   The RCA is a small caliber vessel.    The right coronary artery and its major branches are widely patent  without detectable atherosclerosis or stenosis.    ADDITIONAL FINDINGS:     The proximal ascending aorta is normal in size (22.5 mm x 21.9 mm).   Known large pericardial cyst in the right cardiophrenic recess. The  cyst measures 9.4 cm x 5.5 cm in maximal axial dimensions and 5.7 cm  craniocaudally.  Normal pulmonary venous anatomy. All four pulmonary veins drain into  the left atrium.    There is no left ventricular mass or thrombus.   There is no left atrial appendage thrombus.   Normal pericardial thickness. There is no pericardial effusion.  Please review the separate Radiology report for incidental noncardiac  findings.    I have personally reviewed the examination and initial interpretation  and I agree with the findings.    SAUNDRA ASCENCIO MD         SYSTEM ID:  [id number]   Radiologist Consult For Cardiology    Narrative    EXAMINATION: RADIOLOGIST CONSULT FOR CARDIOLOGY, 7/19/2024 11:34 AM     COMPARISON: CT chest 7/17/2024    HISTORY: Pericardial cyst    TECHNIQUE: Cardiac CT was performed by cardiology. Interpretation of  the noncardiac portions of the study was requested.  Field of view  extending from approximately the karen to the upper abdomen    FINDINGS:   Heart size is within normal limits. Large right-sided anterior  pericardial homogenous collection with simple fluid attenuation  (4/32). Collection measures 9.6 x 5.0 cm in axial plane. No definite  solid component is identified within the area. Normal caliber of the  visualized thoracic aorta and main pulmonary artery. No significant  coronary artery atherosclerotic calcifications. No pericardial  effusion. The visualized mediastinal and hilar  lymph nodes are not  enlarged. No central PE.    No pleural effusion or pneumothorax at this level. The visualized  lungs are clear. Bibasilar subsegmental dependent atelectasis. Mild  compressive atelectasis in the right middle lobe just adjacent to the  above described pericardial cyst.    Visualized upper abdomen is unremarkable. No acute osseous lesions of  the visualized thorax. Bilateral breast implants appear intact.      Impression    IMPRESSION:  1. Stable appearance of the right sided anterior pericardial cyst  measuring 9.6 x 5.0 in greatest transaxial dimension.  2. Please see separate report for the cardiac portions of the study.    I have personally reviewed the examination and initial interpretation  and I agree with the findings.    LEIF RUSS MD         SYSTEM ID:  [id number]     WBC 11.5

## 2024-07-19 NOTE — DISCHARGE SUMMARY
[facility]  Hospitalist Discharge Summary      Date of Admission:  7/17/2024  Date of Discharge:  7/19/2024  Discharging Provider: Sage Smith MD  Discharge Service: Hospitalist Service, GOLD TEAM 7    Discharge Diagnoses   Pericardial Cyst (9.6 X 5 cm)    Clinically Significant Risk Factors          Follow-ups Needed After Discharge   Follow-up Appointments     Adult [facility] Follow-up and recommended labs and tests      Follow up with Dr. Gideon Huerta at [facility] next week.    They will contact you with appointment time.     Appointments on [facility] and/or [facility] (with [facility] or [facility]   provider or service). Call [phone] if you haven't heard regarding   these appointments within 7 days of discharge.            Unresulted Labs Ordered in the Past 30 Days of this Admission       No orders found from 6/17/2024 to 7/18/2024.            Discharge Disposition   Discharged to home  Condition at discharge: Stable    Hospital Course   38 yo female presented with CT demonstrating a 9.6 cm X 5 cm pericardial cyst.  Has sx of cough and CARMICHAEL and orthopnea.  Workup here included a normal echocardiogram with EF 60-65% and CT coronary angiogram with no evidence coronary calcium.  CRP normal so no evidence of pericarditis.  BNP and EKG also normal.  Patient had been started on doxycycline and steroids as outpatient for possible bronchitis, but give absence of suggestive sx and normal chest CT she was instructed to stop these.  She was seen by cardiothoracic surgery and thoracic surgery regarding this symptomatic pericardial cyst.  Dr. Huerta will plan to see patient next week in clinic to further discuss surgical plan.     Consultations This Hospital Stay   CARDIOLOGY GENERAL ADULT IP CONSULT  NURSING TO CONSULT FOR VASCULAR ACCESS CARE IP CONSULT  THORACIC SURGERY ADULT IP CONSULT    Code Status   Full Code    Time Spent on this Encounter   Sage FRANKEL  Humberto Smith MD, personally saw the patient today and spent greater than 30 minutes discharging this patient.       Sage Smith MD  [facility] [address]  [address] [place] [id number]  Phone: [phone]  ______________________________________________________________________    Physical Exam   Vital Signs: Temp: 98.6  F (37  C) Temp src: Oral BP: 108/73 Pulse: 64   Resp: 18 SpO2: 97 % O2 Device: None (Room air)    Weight: 0 lbs 0 oz  General Appearance: Well appearing  Respiratory: CTAB  Cardiovascular: RRR without M  GI: +BS NT  Skin: No Rash  Other: A&O X 4        Primary Care Physician   Margaret Aragon    Discharge Orders      Reason for your hospital stay    You were admitted with shortness of breath and found to have 9 cm pericardial cyst that likely explains symptoms.  You had a CT scan that showed normal lungs and no evidence of coronary artery disease. You were seen by cardiac and thoracic surgery and plan is for follow up in thoracic surgery next week to plan for surgery.     Activity    Your activity upon discharge: activity as tolerated     Adult [facility] Follow-up and recommended labs and tests    Follow up with Dr. Gideon Huerta at [facility] next week.  They will contact you with appointment time.     Appointments on [place] and/or [facility] (with [facility] or [facility] provider or service). Call [phone] if you haven't heard regarding these appointments within 7 days of discharge.     Diet    Follow this diet upon discharge: Orders Placed This Encounter      Combination Diet Regular Diet Adult       Significant Results and Procedures   Most Recent 3 CBC's:  Recent Labs   Lab Test 07/17/24 2046 04/26/22  1609 09/23/21  0045   WBC 11.5* 7.8 14.1*   HGB 13.8 13.8 13.3   MCV 89 88 88    346 339     Most Recent 3 BMP's:  Recent Labs   Lab Test 07/19/24  0657 07/18/24  2322 07/17/24 2046 04/26/22  1609 09/23/21  0045   NA  --   --  138 140 138    POTASSIUM 4.0 3.8 4.0 4.2 3.8   CHLORIDE  --   --  103 108 109   CO2  --   --  24 27 25   BUN  --   --  19.5 18 16   CR  --   --  0.56 0.79 0.62   ANIONGAP  --   --  11 5 4   KASSANDRA  --   --  8.7* 9.0 8.8   GLC  --   --  96 94 81   ,   Results for orders placed or performed during the hospital encounter of 07/17/24   Radiologist Consult For Cardiology    Narrative    EXAMINATION: RADIOLOGIST CONSULT FOR CARDIOLOGY, 7/19/2024 11:34 AM     COMPARISON: CT chest 7/17/2024    HISTORY: Pericardial cyst    TECHNIQUE: Cardiac CT was performed by cardiology. Interpretation of  the noncardiac portions of the study was requested.  Field of view  extending from approximately the karen to the upper abdomen    FINDINGS:   Heart size is within normal limits. Large right-sided anterior  pericardial homogenous collection with simple fluid attenuation  (4/32). Collection measures 9.6 x 5.0 cm in axial plane. No definite  solid component is identified within the area. Normal caliber of the  visualized thoracic aorta and main pulmonary artery. No significant  coronary artery atherosclerotic calcifications. No pericardial  effusion. The visualized mediastinal and hilar lymph nodes are not  enlarged. No central PE.    No pleural effusion or pneumothorax at this level. The visualized  lungs are clear. Bibasilar subsegmental dependent atelectasis. Mild  compressive atelectasis in the right middle lobe just adjacent to the  above described pericardial cyst.    Visualized upper abdomen is unremarkable. No acute osseous lesions of  the visualized thorax. Bilateral breast implants appear intact.      Impression    IMPRESSION:  1. Stable appearance of the right sided anterior pericardial cyst  measuring 9.6 x 5.0 in greatest transaxial dimension.  2. Please see separate report for the cardiac portions of the study.    I have personally reviewed the examination and initial interpretation  and I agree with the findings.    LEIF RUSS MD          SYSTEM ID:  [id number]   Echo Complete     Value    LVEF  60-65%    [facility]    [id number]  [id number]  [id number]  [id number]^MELISSA^ALVINA     [facility]  Echocardiography Laboratory  [address] [place] [address]     Name: LINDA CAMPOS  MRN: [id number]  : 1985  Study Date: 2024 09:13 AM  Age: 39 yrs  Gender: Female  Patient Location: [facility]  Reason For Study: Other Diseases of Pericardium  Ordering Physician: ALVINA TORRES  Performed By: Marycruz Delong     BSA: 1.6 m2  Height: 60 in  Weight: 144 lb  HR: 73  BP: 114/73 mmHg  ______________________________________________________________________________  Procedure  Echocardiogram with two-dimensional, color and spectral Doppler performed.  ______________________________________________________________________________  Interpretation Summary  Global and regional left ventricular function is normal with an EF of 60-65%.  Global right ventricular function is normal.  No significant valvular abnormalities present.  Pulmonary artery systolic pressure is normal.  Estimated mean right atrial pressure is normal.  No pericardial effusion is present.  ______________________________________________________________________________  Left Ventricle  Left ventricular size is normal. Left ventricular wall thickness is normal.  Global and regional left ventricular function is normal with an EF of 60-65%.  Left ventricular diastolic function is normal. No regional wall motion  abnormalities are seen.     Right Ventricle  The right ventricle is normal size. Global right ventricular function is  normal.     Atria  Both atria appear normal.     Mitral Valve  The mitral valve is normal.     Aortic Valve  Aortic valve is normal in structure and function.     Tricuspid Valve  The tricuspid valve is normal. Trace to mild tricuspid insufficiency is  present. The right ventricular systolic pressure is approximated at 21.2 mmHg  plus  the right atrial pressure. Pulmonary artery systolic pressure is normal.     Pulmonic Valve  The pulmonic valve is normal.     Vessels  The aorta root is normal. The inferior vena cava is normal. Estimated mean  right atrial pressure is normal.     Pericardium  No pericardial effusion is present.     Miscellaneous  No significant valvular abnormalities present.     Compared to Previous Study  Previous study not available for comparison.  ______________________________________________________________________________  MMode/2D Measurements & Calculations  IVSd: 0.89 cm  LVIDd: 3.3 cm  LVIDs: 2.0 cm  LVPWd: 0.99 cm  FS: 40.0 %  LV mass(C)d: 86.7 grams  LV mass(C)dI: 53.4 grams/m2  Ao root diam: 2.5 cm  asc Aorta Diam: 2.4 cm  LVOT diam: 1.8 cm  LVOT area: 2.6 cm2  Ao root diam index Ht(cm/m): 1.6  Ao root diam index BSA (cm/m2): 1.5  Asc Ao diam index BSA (cm/m2): 1.5  Asc Ao diam index Ht(cm/m): 1.6  EF Biplane: 66.5 %  LA Volume (BP): 32.2 ml     LA Volume Index (BP): 19.9 ml/m2  RV Base: 3.2 cm  RWT: 0.60  TAPSE: 2.0 cm     Doppler Measurements & Calculations  MV E max usama: 88.6 cm/sec  MV A max usama: 56.6 cm/sec  MV E/A: 1.6  MV dec slope: 493.2 cm/sec2  MV dec time: 0.18 sec  PA acc time: 0.14 sec  TR max usama: 230.0 cm/sec  TR max P.2 mmHg     E/E' av.4  Lateral E/e': 5.7  Medial E/e': 9.0  RV S Usama: 13.4 cm/sec     ______________________________________________________________________________  Report approved by: Jeremiah Duarte 2024 11:17 AM         CT Angiogram coronary artery    Narrative    Procedure: CTA ANGIOGRAM CORONARY ARTERY   Examination Date: 2024 11:34 AM   Indication: Coronary evaluation   Ordering Provider: Clinton  Overall quality of the study: Good.   Comparison: Prior CT dated 2024.    PROCEDURE: ECG gated multi-slice computed tomography of the heart with  and without intravenous contrast  (Isovue 370, 120 mL at rate of 5.5  mL/sec) was performed on a Siemens Dual  Source Flash scanner without  incident. Medical therapy was used to optimize heart rate (metoprolol  50 mg oral, ivabradine 10 oral). Sublingual Nitrostat 0.8 mg was given  prior to scanning. Coronary artery calcium scoring was performed using  the Flash scanner protocol. The CTA portion was performed in the  spiral mode at a heart rate of 55 bpm with 100 kVp. Images were  reconstructed and analyzed on a Timescape workstation. The scan  protocol was optimized to minimize radiation exposure. The total  radiation exposure, including calcium artery calcium scoring, was  calculated to be 293 DLP and 4.1 mSv.        Impression    IMPRESSION:  1.  Normal coronary arteries without detectable atherosclerosis or  stenosis.  2.  Total Agatston score 0 placing the patient in the lowest  percentile when compared to an age- and gender-matched control group.  3.  Known right-sided pericardial cyst, unchanged from the prior  non-cardiac CT dated 2024.  4.  Please review the separate Radiology report for incidental  noncardiac findings.    FINDINGS:    CORONARY CALCIUM SCORE    Total Agatston calcium score: 0   Left main: 0  Left anterior descendin  Left circumflex: 0  Right coronary artery: 0   This places the patient in the lowest  percentile when compared to an  age- and gender-matched control group.    CORONARY ANGIOGRAPHY    DOMINANCE: Right dominant system.   Normal coronary origins and course.    LEFT MAIN:   The LM is a medium caliber vessel.   The left main arises normally from the left coronary cusp and is  widely patent without detectable atherosclerosis or stenosis.     LEFT ANTERIOR DESCENDING:   The LAD is a medium caliber vessel. It has a type III morphology. It  gives rise to two diagonal branches.   The left anterior descending and its major diagonal branches are  widely patent without any detectable stenosis.    LEFT CIRCUMFLEX:   The LCx is a medium caliber vessel. It gives rise to two obtuse  marginal  branches .   The left circumflex and its major branches are widely patent without  detectable atherosclerosis or stenosis.    RIGHT CORONARY ARTERY:   The RCA is a small caliber vessel.    The right coronary artery and its major branches are widely patent  without detectable atherosclerosis or stenosis.    ADDITIONAL FINDINGS:     The proximal ascending aorta is normal in size (22.5 mm x 21.9 mm).   Known large pericardial cyst in the right cardiophrenic recess. The  cyst measures 9.4 cm x 5.5 cm in maximal axial dimensions and 5.7 cm  craniocaudally.  Normal pulmonary venous anatomy. All four pulmonary veins drain into  the left atrium.    There is no left ventricular mass or thrombus.   There is no left atrial appendage thrombus.   Normal pericardial thickness. There is no pericardial effusion.  Please review the separate Radiology report for incidental noncardiac  findings.    I have personally reviewed the examination and initial interpretation  and I agree with the findings.    SAUNDRA ASCENCIO MD         SYSTEM ID:  [id number]       Discharge Medications   Current Discharge Medication List        CONTINUE these medications which have NOT CHANGED    Details   tretinoin (RETIN-A) 0.025 % external cream Apply a pea-sized amount evenly over face at nighttime before bed  Qty: 45 g, Refills: 11    Associated Diagnoses: Acne vulgaris           STOP taking these medications       doxycycline hyclate (VIBRA-TABS) 100 MG tablet Comments:   Reason for Stopping:         methylPREDNISolone (MEDROL DOSEPAK) 4 MG tablet therapy pack Comments:   Reason for Stopping:             Allergies   Allergies   Allergen Reactions    Clindamycin

## 2024-07-19 NOTE — PROGRESS NOTES
[facility]    Cardiology Progress Note- Cards 1        Date of Admission:  7/17/2024     Assessment & Plan: SL   Shayla Mcclure is a 39 year old female admitted on 7/17/2024 for pericardial cyst. She is otherwise healthy. Patient initially presented to PCP given several weeks of shortness of breath and chest pain with CXR revealing pericardial cyst. Troponin is negative, BNP normal, EKG normal here. CRP was checked to assess for potential pericarditis as a cause of this pericardial cyst, but was negative and it is plausible that this is congenital. Echocardiogram reveals normal cardiac function with an EF of 60-65%. There is no evidence of obstruction or RV dysfunction on echocardiogram or on physical exam. Given the clinical description and exertional nature of her chest pain, coronary CTA was obtained to more definitely rule out coronary disease as a potential cause and this showed no evidence of coronary calcification. Further decision regarding pericardial cyst intervention would be up to CT Surgery.     # Pericardial Cyst, 9.5cm x 5.2cm x 5.2cm  # Chest pain    Updates 07/19:  -CTA without any evidence of coronary disease     Recommendations:   - Would continue to address with surgery about potential options    Thank you for the consult, cardiology will sign off at this time.        Clinically Significant Risk Factors                                         Entered: Davina Dominguez 07/19/2024, 2:42 PM   The patient's care was discussed with the Attending Physician, Dr. Le .      Davina Dominguez, MS4  [facility]    I saw this patient together with medical student/resident, and performed an independent exam at the same time which confirmed findings. I have edited this note as appropriate to reflect our joint assessment/plan. Patient was also seen and discussed with attending physician, Dr. Le, who is in  agreement with above.     Sreedhar Alonzo  Cardiology Fellow    ______________________________________________________________________    Interval History   No acute events, doing well and vitally stable.     Physical Exam   Vital Signs: Temp: 98.6  F (37  C) Temp src: Oral BP: 108/73 Pulse: 64   Resp: 18 SpO2: 97 % O2 Device: None (Room air)    Weight: 0 lbs 0 oz    General: Patient is in no acute distress. Sitting upright in bed.  HEENT: No scleral icterus. EOMs intact.   Resp: No increased work of breathing. No cyanosis.    Cardio: Regular rate and rhythm. There is no peripheral edema.    Abd/: Abdomen is nondistended.   MSK: No gross deformities.   Neuro/Psych: Patient is alert and comfortably conversant.      Medical Decision Making         Data     I have personally reviewed the following data over the past 24 hrs:    N/A  \   N/A   / N/A     N/A N/A N/A /  N/A   4.0 N/A N/A \

## 2024-07-19 NOTE — PLAN OF CARE
Vital signs:  Temp: 98.6  F (37  C) Temp src: Oral BP: 113/65 Pulse: 67   Resp: 17 SpO2: 99 % O2 Device: None (Room air)          Patient arrived onto 7B at 2225 and was oriented to room and call light. Admit completed.    Activity: Up to bathroom with SBA.   Neuros: A & O x4. Neuro intact.   Cardiac: HR 60s-80s. On cardiac monitoring, sinus rhythm. BP 125b-34z-94j. Patient states has occasional chest pain (currently denies) and HA 5/10. PRN Tylenol x1 ordered and administered, patient slept.  Respiratory: LS clear. O2 sats high 90s on RA. Denies SOB. Unlabored.   GI/: BS+, passing flatus, had BM two days ago. Voiding, not saving.  Diet: Regular diet.  Skin: No new deficits.  Lines: PIV saline locked. Small drainage at site.  Incisions: None.  Labs: Reviewed. K+ 3.8. Mg 3.6, next redraw tomorrow AM.  Pain/nausea: C/o 5/10 anterior left and right HA, notified provider who ordered PRN Tylenol every four hours, administered, patient slept in between cares. Denies nausea.  New changes this shift: Admit onto 7B.   Plan: Continue POC.

## 2024-07-22 ENCOUNTER — TRANSCRIBE ORDERS (OUTPATIENT)
Dept: OTHER | Age: 39
End: 2024-07-22

## 2024-07-22 ENCOUNTER — PATIENT OUTREACH (OUTPATIENT)
Dept: ONCOLOGY | Facility: CLINIC | Age: 39
End: 2024-07-22
Payer: COMMERCIAL

## 2024-07-22 DIAGNOSIS — Q24.8 PERICARDIAL CYST: Primary | ICD-10-CM

## 2024-07-22 NOTE — PROGRESS NOTES
New Patient Thoracic Surgery Nurse Navigator Note     Referring provider: Hospital follow up for pericardial cyst.     Referred to (specialty): Thoracic Surgery    Requested provider (if applicable): Dr Huerta     Date Referral Received: 7/22/2024     Evaluation for : pericardial cyst     Clinical History (per Nurse review of records provided):    **BOOK MARKED**        Narrative & Impression   CT CHEST WITH CONTRAST July 17, 2024 2:50 PM     CLINICAL HISTORY: Abnormal chest x-ray.     TECHNIQUE: CT chest with IV contrast. Multiplanar reformats were  obtained. Dose reduction techniques were used.  CONTRAST: 70mL Isovue-370.     COMPARISON: Chest x-ray 7/13/2024.     FINDINGS:   LUNGS AND PLEURA: No effusion or acute airspace consolidation. No  focal worrisome airspace disease.     MEDIASTINUM/AXILLAE: There is a large right-sided pericardial cyst  anteriorly that is 9.5 x 5.2 x 5.2 cm series 3 image 91. This is  partially obscured by streak artifact but no visible internal  nodularity can be seen. Remainder of the mediastinum shows no acute  abnormality. No adenopathy.     CORONARY ARTERY CALCIFICATION: None.     UPPER ABDOMEN: Upper abdomen shows no significant abnormality.     MUSCULOSKELETAL: No aggressive bone lesion. Bilateral breast implants.                                                                      IMPRESSION:   1.  Prominent right pericardial cyst accounting for the chest x-ray  abnormality. This measures up to 9.5 cm in maximal size.  2.  No other significant abnormality.     SHAMIKA SCHUMACHER MD      Records Location (Care Everywhere, Media, etc.): [facility]     Records Needed: none     Additional testing needed prior to consult: NONE

## 2024-07-23 NOTE — PROGRESS NOTES
[facility] Dermatology Note  Encounter Date: Jul 25, 2024  Office Visit     Reviewed patients past medical history and pertinent chart review prior to patients visit today.     Dermatology Problem List:    0. NUB left postauricular ear, shave biopsy 07/25/24 .    Family Hx: No family history of skin cancer   Personal Hx: No personal history of skin cancer.   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 Shave biopsy today.    Procedure Note: Biopsy by shave technique  The risks and benefits of the procedure were described to the patient. These include but are not limited to bleeding, infection, scar, incomplete removal, and non-diagnostic biopsy. Verbal informed consent was obtained. The above site(s) was cleansed with an alcohol pad and injected with 1% lidocaine with epinephrine. Once anesthesia was obtained, a biopsy(ies) was performed with Gilette blade. The tissue(s) was placed in a labeled container(s) with formalin and sent to pathology. Hemostasis was achieved with aluminum chloride. Vaseline and a bandage were applied to the wound(s). The patient tolerated the procedure well and was given post biopsy care instructions.    # Intradermal nevus, right chin  - Discussed benign nature  - Discussed cosmetic removal if desired   - if lesion changes in size, shape, color or develops symptoms such as itching or bleeding, return to clinic for re-evaluation    # Cherry angiomas  # telangectasia, nasal tip  - We discussed the benign nature of the skin lesions. No treatment required. Continued observation recommended. Follow up with any concerns.     Follow up: Return to clinic as needed for new or changing lesions     Dominique Mon PA-C  [facility]  Dermatology    _______________________________________    CC: Derm Problem (Spot check- she has had this  since she was 15 years told. It has started to get bigger and it is itchy/bothersome now. She also has a spot on her right side of chin. Thought it was a pimple but has not gone away. Tip of nose. )    HPI:  Ms. Shayla Mcclure is a(n) 39 year old female who presents today as a new patient for a spot check on her left postauricular ear, the tip of the nose, right anterior thigh and right side of chin. The lesion on the left postauricular area has been present since she was a teenager. This originally started as a white macule but then developed freckling in it over the past years. She is wondering if this is of concern.  Additionally, she has lesions of concern on her nasal tip, right anterior thigh and right side of the chin.  These are all new over the past couple years and she like to make sure they are not of concern.    Patient is otherwise feeling well, without additional skin concerns.      Physical Exam:  SKIN: Focused examination of the face was performed.  - NUB, left postauricular area, hypopigmented patch with faint brown macules in the center.  -Right anterior thigh, red, domed papule  - Nasal tip, telangiectasia  - Right chin, flesh colored papule with reassuring features on dermoscopy.    - No other lesions of concern on areas examined.     Medications:  Current Outpatient Medications   Medication Sig Dispense Refill    tretinoin (RETIN-A) 0.025 % external cream Apply a pea-sized amount evenly over face at nighttime before bed 45 g 11     No current facility-administered medications for this visit.      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

## 2024-07-25 ENCOUNTER — OFFICE VISIT (OUTPATIENT)
Dept: DERMATOLOGY | Facility: CLINIC | Age: 39
End: 2024-07-25
Payer: COMMERCIAL

## 2024-07-25 DIAGNOSIS — D18.01 CHERRY ANGIOMA: Primary | ICD-10-CM

## 2024-07-25 DIAGNOSIS — L70.0 ACNE VULGARIS: ICD-10-CM

## 2024-07-25 DIAGNOSIS — D23.9 INTRADERMAL NEVUS: ICD-10-CM

## 2024-07-25 DIAGNOSIS — D48.5 NEOPLASM OF UNCERTAIN BEHAVIOR OF SKIN: ICD-10-CM

## 2024-07-25 PROCEDURE — 99213 OFFICE O/P EST LOW 20 MIN: CPT | Mod: 25 | Performed by: STUDENT IN AN ORGANIZED HEALTH CARE EDUCATION/TRAINING PROGRAM

## 2024-07-25 PROCEDURE — 88305 TISSUE EXAM BY PATHOLOGIST: CPT | Performed by: PATHOLOGY

## 2024-07-25 PROCEDURE — 88342 IMHCHEM/IMCYTCHM 1ST ANTB: CPT | Performed by: PATHOLOGY

## 2024-07-25 PROCEDURE — 11102 TANGNTL BX SKIN SINGLE LES: CPT | Performed by: STUDENT IN AN ORGANIZED HEALTH CARE EDUCATION/TRAINING PROGRAM

## 2024-07-25 RX ORDER — TRETINOIN 0.25 MG/G
CREAM TOPICAL
Qty: 45 G | Refills: 11 | Status: SHIPPED | OUTPATIENT
Start: 2024-07-25 | End: 2024-09-26

## 2024-07-25 ASSESSMENT — PAIN SCALES - GENERAL: PAINLEVEL: SEVERE PAIN (7)

## 2024-07-25 NOTE — PATIENT INSTRUCTIONS
Wound Care After a Biopsy    What is a skin biopsy?  A skin biopsy allows the doctor to examine a very small piece of tissue under the microscope to determine the diagnosis and the best treatment for the skin condition. A local anesthetic (numbing medicine) is injected with a very small needle into the skin area to be tested. A small piece of skin is taken from the area. Sometimes a suture (stitch) is used.     What are the risks of a skin biopsy?  I will experience scar, bleeding, swelling, pain, crusting and redness. I may experience incomplete removal or recurrence. Risks of this procedure are excessive bleeding, bruising, infection, nerve damage, numbness, thick (hypertrophic or keloidal) scar and non-diagnostic biopsy.    How should I care for my wound for the first 24 hours?  Keep the wound dry and covered for 24 hours  If it bleeds, hold direct pressure on the area for 15 minutes. If bleeding does not stop, call us or go to the emergency room  Avoid strenuous exercise the first 1-2 days or as your doctor instructs you    How should I care for the wound after 24 hours?  After 24 hours, remove the bandage  You may bathe or shower as normal  If you had a scalp biopsy, you can shampoo as usual and can use shower water to clean the biopsy site daily  Clean the wound once a day with gentle soap and water  Do not scrub, be gentle  Apply white petroleum/Vaseline after cleaning the wound with a cotton swab or a clean finger, and keep the site covered with a Bandaid /bandage. Bandages are not necessary with a scalp biopsy  If you are unable to cover the site with a Bandaid /bandage, re-apply ointment 2-3 times a day to keep the site moist. Moisture will help with healing  Avoid strenuous activity for first 1-2 days  Avoid lakes, rivers, pools, and oceans until the stitches are removed or the site is healed    How do I clean my wound?  Wash hands thoroughly with soap or use hand  before all wound care  Clean  the wound with gentle soap and water  Apply white petroleum/Vaseline  to wound after it is clean  Replace the Bandaid /bandage to keep the wound covered for the first few days or as instructed by your doctor  If you had a scalp biopsy, warm shower water to the area on a daily basis should suffice    What should I use to clean my wound?   Cotton-tipped applicators (Qtips )  White petroleum jelly (Vaseline ). Use a clean new container and use Q-tips to apply.  Bandaids  as needed  Gentle soap     How should I care for my wound long term?  Do not get your wound dirty  Keep up with wound care for one week or until the area is healed.  A small scab will form and fall off by itself when the area is completely healed. The area will be red and will become pink in color as it heals. Sun protection is very important for how your scar will turn out. Sunscreen with an SPF 30 or greater is recommended once the area is healed.  You should have some soreness but it should be mild and slowly go away over several days. Talk to your doctor about using tylenol for pain,    When should I call my doctor?  If you have increased:   Pain or swelling  Pus or drainage (clear or slightly yellow drainage is ok)  Temperature over 100F  Spreading redness or warmth around wound    When will I hear about my results?  The biopsy results can take 2 weeks to come back.  Your results will automatically release to [employer] before your provider has even reviewed them.  The clinic will call you with the results, send you a [employer] message, or have you schedule a follow-up clinic or phone time to discuss the results.  Contact our clinics if you do not hear from us in 2 weeks.    Who should I call with questions?  [facility]: [phone]  [facility]: [phone]  For urgent needs outside of business hours call the [facility] at [phone] and ask for the dermatology resident on call

## 2024-07-25 NOTE — NURSING NOTE
Shayla Mcclure's chief complaint for this visit includes:  Chief Complaint   Patient presents with    Derm Problem     Spot check- she has had this since she was 15 years told. It has started to get bigger and it is itchy/bothersome now. She also has a spot on her right side of chin. Thought it was a pimple but has not gone away. Tip of nose.      PCP: Margaret Aragon    Referring Provider:  Referred Self, MD  No address on file    There were no vitals taken for this visit.  Severe Pain (7)        Allergies   Allergen Reactions    Clindamycin          Do you need any medication refills at today's visit?  Yes- pended.       Danielle Londono RN on 7/25/2024 at 2:25 PM

## 2024-07-25 NOTE — NURSING NOTE
The following medication was given:     MEDICATION:  Lidocaine with epinephrine 1% 1:[id number]  ROUTE: SQ  SITE: see procedure note  DOSE: 0.5 mL  LOT #: [id number]  : weendy  EXPIRATION DATE: 01-  NDC#: 29795-229-70  Was there drug waste? Yes, 0.5 mL  Multi-dose vial: Yes    Danielle Londono RN  July 25, 2024

## 2024-07-25 NOTE — LETTER
7/25/2024      Shayla Mcclure  [address] [address]      Dear Colleague,    Thank you for referring your patient, Shayla Mcclure, to the [facility]. Please see a copy of my visit note below.    [facility] Dermatology Note  Encounter Date: Jul 25, 2024  Office Visit     Reviewed patients past medical history and pertinent chart review prior to patients visit today.     Dermatology Problem List:    0. NUB left postauricular ear, shave biopsy 07/25/24 .    Family Hx: No family history of skin cancer   Personal Hx: No personal history of skin cancer.   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 Shave biopsy today.    Procedure Note: Biopsy by shave technique  The risks and benefits of the procedure were described to the patient. These include but are not limited to bleeding, infection, scar, incomplete removal, and non-diagnostic biopsy. Verbal informed consent was obtained. The above site(s) was cleansed with an alcohol pad and injected with 1% lidocaine with epinephrine. Once anesthesia was obtained, a biopsy(ies) was performed with Gilette blade. The tissue(s) was placed in a labeled container(s) with formalin and sent to pathology. Hemostasis was achieved with aluminum chloride. Vaseline and a bandage were applied to the wound(s). The patient tolerated the procedure well and was given post biopsy care instructions.    # Intradermal nevus, right chin  - Discussed benign nature  - Discussed cosmetic removal if desired   - if lesion changes in size, shape, color or develops symptoms such as itching or bleeding, return to clinic for re-evaluation    # Cherry angiomas  # telangectasia, nasal tip  - We discussed the benign nature of the skin lesions. No treatment required. Continued observation recommended. Follow up with any  concerns.     Follow up: Return to clinic as needed for new or changing lesions     Dominique Mon PA-C  [facility]  Dermatology    _______________________________________    CC: Derm Problem (Spot check- she has had this since she was 15 years told. It has started to get bigger and it is itchy/bothersome now. She also has a spot on her right side of chin. Thought it was a pimple but has not gone away. Tip of nose. )    HPI:  Ms. Shayla Mcclure is a(n) 39 year old female who presents today as a new patient for a spot check on her left postauricular ear, the tip of the nose, right anterior thigh and right side of chin. The lesion on the left postauricular area has been present since she was a teenager. This originally started as a white macule but then developed freckling in it over the past years. She is wondering if this is of concern.  Additionally, she has lesions of concern on her nasal tip, right anterior thigh and right side of the chin.  These are all new over the past couple years and she like to make sure they are not of concern.    Patient is otherwise feeling well, without additional skin concerns.      Physical Exam:  SKIN: Focused examination of the face was performed.  - NUB, left postauricular area, hypopigmented patch with faint brown macules in the center.  -Right anterior thigh, red, domed papule  - Nasal tip, telangiectasia  - Right chin, flesh colored papule with reassuring features on dermoscopy.    - No other lesions of concern on areas examined.     Medications:  Current Outpatient Medications   Medication Sig Dispense Refill     tretinoin (RETIN-A) 0.025 % external cream Apply a pea-sized amount evenly over face at nighttime before bed 45 g 11     No current facility-administered medications for this visit.      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       Again, thank you for allowing me to participate in the care of your patient.        Sincerely,        Dominique Mon PA-C

## 2024-07-30 ENCOUNTER — HOSPITAL ENCOUNTER (EMERGENCY)
Facility: CLINIC | Age: 39
Discharge: HOME | End: 2024-07-30
Attending: EMERGENCY MEDICINE | Admitting: EMERGENCY MEDICINE
Payer: COMMERCIAL

## 2024-07-30 ENCOUNTER — APPOINTMENT (OUTPATIENT)
Dept: GENERAL RADIOLOGY | Facility: CLINIC | Age: 39
End: 2024-07-30
Attending: EMERGENCY MEDICINE
Payer: COMMERCIAL

## 2024-07-30 VITALS
BODY MASS INDEX: 28.27 KG/M2 | WEIGHT: 144 LBS | HEART RATE: 84 BPM | SYSTOLIC BLOOD PRESSURE: 109 MMHG | TEMPERATURE: 98.5 F | DIASTOLIC BLOOD PRESSURE: 75 MMHG | RESPIRATION RATE: 16 BRPM | HEIGHT: 60 IN | OXYGEN SATURATION: 98 %

## 2024-07-30 DIAGNOSIS — R07.9 CHEST PAIN, UNSPECIFIED TYPE: ICD-10-CM

## 2024-07-30 LAB
ANION GAP SERPL CALCULATED.3IONS-SCNC: 12 MMOL/L (ref 7–15)
BASOPHILS # BLD AUTO: 0.1 10E3/UL (ref 0–0.2)
BASOPHILS NFR BLD AUTO: 1 %
BUN SERPL-MCNC: 12.5 MG/DL (ref 6–20)
CALCIUM SERPL-MCNC: 9.2 MG/DL (ref 8.8–10.4)
CHLORIDE SERPL-SCNC: 103 MMOL/L (ref 98–107)
CREAT SERPL-MCNC: 0.53 MG/DL (ref 0.51–0.95)
D DIMER PPP FEU-MCNC: 0.45 UG/ML FEU (ref 0–0.5)
EGFRCR SERPLBLD CKD-EPI 2021: >90 ML/MIN/1.73M2
EOSINOPHIL # BLD AUTO: 0.3 10E3/UL (ref 0–0.7)
EOSINOPHIL NFR BLD AUTO: 3 %
ERYTHROCYTE [DISTWIDTH] IN BLOOD BY AUTOMATED COUNT: 12.8 % (ref 10–15)
GLUCOSE SERPL-MCNC: 92 MG/DL (ref 70–99)
HCO3 SERPL-SCNC: 24 MMOL/L (ref 22–29)
HCT VFR BLD AUTO: 39.4 % (ref 35–47)
HGB BLD-MCNC: 13.5 G/DL (ref 11.7–15.7)
IMM GRANULOCYTES # BLD: 0 10E3/UL
IMM GRANULOCYTES NFR BLD: 0 %
LYMPHOCYTES # BLD AUTO: 2.8 10E3/UL (ref 0.8–5.3)
LYMPHOCYTES NFR BLD AUTO: 31 %
MCH RBC QN AUTO: 29.4 PG (ref 26.5–33)
MCHC RBC AUTO-ENTMCNC: 34.3 G/DL (ref 31.5–36.5)
MCV RBC AUTO: 86 FL (ref 78–100)
MONOCYTES # BLD AUTO: 0.5 10E3/UL (ref 0–1.3)
MONOCYTES NFR BLD AUTO: 5 %
NEUTROPHILS # BLD AUTO: 5.4 10E3/UL (ref 1.6–8.3)
NEUTROPHILS NFR BLD AUTO: 60 %
NRBC # BLD AUTO: 0 10E3/UL
NRBC BLD AUTO-RTO: 0 /100
PATH REPORT.COMMENTS IMP SPEC: NORMAL
PATH REPORT.COMMENTS IMP SPEC: NORMAL
PATH REPORT.FINAL DX SPEC: NORMAL
PATH REPORT.GROSS SPEC: NORMAL
PATH REPORT.MICROSCOPIC SPEC OTHER STN: NORMAL
PATH REPORT.RELEVANT HX SPEC: NORMAL
PLATELET # BLD AUTO: 374 10E3/UL (ref 150–450)
POTASSIUM SERPL-SCNC: 3.9 MMOL/L (ref 3.4–5.3)
RBC # BLD AUTO: 4.59 10E6/UL (ref 3.8–5.2)
SODIUM SERPL-SCNC: 139 MMOL/L (ref 135–145)
TROPONIN T SERPL HS-MCNC: <6 NG/L
WBC # BLD AUTO: 9 10E3/UL (ref 4–11)

## 2024-07-30 PROCEDURE — 84484 ASSAY OF TROPONIN QUANT: CPT | Performed by: EMERGENCY MEDICINE

## 2024-07-30 PROCEDURE — 93010 ELECTROCARDIOGRAM REPORT: CPT | Performed by: EMERGENCY MEDICINE

## 2024-07-30 PROCEDURE — 93005 ELECTROCARDIOGRAM TRACING: CPT | Performed by: EMERGENCY MEDICINE

## 2024-07-30 PROCEDURE — 80048 BASIC METABOLIC PNL TOTAL CA: CPT | Performed by: EMERGENCY MEDICINE

## 2024-07-30 PROCEDURE — 71046 X-RAY EXAM CHEST 2 VIEWS: CPT

## 2024-07-30 PROCEDURE — 36415 COLL VENOUS BLD VENIPUNCTURE: CPT | Performed by: EMERGENCY MEDICINE

## 2024-07-30 PROCEDURE — 99285 EMERGENCY DEPT VISIT HI MDM: CPT | Mod: 25 | Performed by: EMERGENCY MEDICINE

## 2024-07-30 PROCEDURE — 85379 FIBRIN DEGRADATION QUANT: CPT | Performed by: EMERGENCY MEDICINE

## 2024-07-30 PROCEDURE — 99284 EMERGENCY DEPT VISIT MOD MDM: CPT | Performed by: EMERGENCY MEDICINE

## 2024-07-30 PROCEDURE — 85025 COMPLETE CBC W/AUTO DIFF WBC: CPT | Performed by: EMERGENCY MEDICINE

## 2024-07-30 ASSESSMENT — COLUMBIA-SUICIDE SEVERITY RATING SCALE - C-SSRS
1. IN THE PAST MONTH, HAVE YOU WISHED YOU WERE DEAD OR WISHED YOU COULD GO TO SLEEP AND NOT WAKE UP?: NO
6. HAVE YOU EVER DONE ANYTHING, STARTED TO DO ANYTHING, OR PREPARED TO DO ANYTHING TO END YOUR LIFE?: NO
2. HAVE YOU ACTUALLY HAD ANY THOUGHTS OF KILLING YOURSELF IN THE PAST MONTH?: NO

## 2024-07-30 ASSESSMENT — ACTIVITIES OF DAILY LIVING (ADL)
ADLS_ACUITY_SCORE: 36
ADLS_ACUITY_SCORE: 36
ADLS_ACUITY_SCORE: 34

## 2024-07-30 NOTE — LETTER
July 30, 2024      To Whom It May Concern:      Shayla Mcclure was seen in our Emergency Department today, 07/30/24.  She was brought in by her  George Mcclure.    Sincerely,        Jordi Lucas MD

## 2024-07-30 NOTE — ED TRIAGE NOTES
Comes in with chest pain that has been constant all day. Hx of a mass next to her heart and chest pain but the chest pain usually comes and goes      Triage Assessment (Adult)       Row Name 07/30/24 1611          Triage Assessment    Airway WDL WDL        Respiratory WDL    Respiratory WDL WDL        Skin Circulation/Temperature WDL    Skin Circulation/Temperature WDL WDL        Cardiac WDL    Cardiac WDL WDL        Peripheral/Neurovascular WDL    Peripheral Neurovascular WDL WDL        Cognitive/Neuro/Behavioral WDL    Cognitive/Neuro/Behavioral WDL WDL

## 2024-07-30 NOTE — ED PROVIDER NOTES
History     Chief Complaint   Patient presents with    Chest Pain     HPI  Shayla Mcclure is a 39 year old female who presents for evaluation of dizziness.  This has been present the last 2 to 3 days.  Today also she noticed some chest pressure that has been more constant.  In addition her watch showed her heart rate to go up to 170 even at rest which is atypical for her and as low as 49 which is also atypical.  She has a known pericardial cyst that was discovered this month with maximum dimension of 9 cm.  She was hoping to wait this out until her thoracic surgery appointment which is scheduled for tomorrow but became concerned due to the heart rate, the chest pressure and dizziness.    Allergies:  Allergies   Allergen Reactions    Clindamycin        Problem List:    Patient Active Problem List    Diagnosis Date Noted    Pericardial cyst 07/18/2024     Priority: Medium    Intramural leiomyoma of uterus 10/19/2022     Priority: Medium     Formatting of this note might be different from the original. History of myomectomy through pfannenstiel incision [place] - attempting to get op report. Plan Repeat surgery at 36-(with steroids) or 37 (without steroids) weeks.          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)     History of severe pre-eclampsia     Leiomyoma        Past Surgical History:    Past Surgical History:   Procedure Laterality Date    C/SECTION, LOW TRANSVERSE      delivered at 27w3d for pre-eclampsia with severe features     COSMETIC MAMMOPLASTY AUGMENTATION BILATERAL      when 16 years old    DILATION AND CURETTAGE SUCTION N/A 04/16/2020    Procedure: DILATION AND CURETTAGE, UTERUS, USING SUCTION;  Surgeon: Haydee Mcpherson DO;  Location: [facility] OR    DILATION AND CURETTAGE SUCTION  09/23/2021    repeat procedure for retained POC after prior suction D&C 09/20/2021    DILATION AND CURETTAGE SUCTION N/A 9/20/2021    Procedure: DILATION AND CURETTAGE,  UTERUS, USING SUCTION;  Surgeon: Haydee Mcpherson DO;  Location: [facility] OR    MYOMECTOMY UTERUS  2012    open myomectomy in [place], reported very large fibroids       Family History:    Family History   Problem Relation Age of Onset    Other - See Comments Mother         heavy menstrual cycles    Hashimoto's thyroiditis Mother     Lupus Father     Lupus Maternal Aunt     Leukemia Maternal Grandmother     Cancer No family hx of         Scooter's Syndrome       Social History:  Marital Status:   [2]  Social History     Tobacco Use    Smoking status: Never    Smokeless tobacco: Never   Vaping Use    Vaping status: Never Used   Substance Use Topics    Alcohol use: Yes     Comment: occasionally    Drug use: No        Medications:    tretinoin (RETIN-A) 0.025 % external cream          Review of Systems  All other systems are reviewed and are negative    Physical Exam   BP: (!) 134/103  Pulse: 109  Temp: 98.5  F (36.9  C)  Resp: 20  Height: 152.4 cm (5')  Weight: 65.3 kg (144 lb)  SpO2: 99 %      Physical Exam  Vitals and nursing note reviewed.   Constitutional:       General: She is not in acute distress.     Appearance: She is well-developed. She is not diaphoretic.   HENT:      Head: Normocephalic and atraumatic.      Nose: Nose normal.      Mouth/Throat:      Mouth: Mucous membranes are moist.      Pharynx: Oropharynx is clear.   Eyes:      General: No scleral icterus.     Conjunctiva/sclera: Conjunctivae normal.   Cardiovascular:      Rate and Rhythm: Regular rhythm. Tachycardia present.      Heart sounds: Normal heart sounds. No murmur heard.  Pulmonary:      Effort: Pulmonary effort is normal. No respiratory distress.      Breath sounds: No stridor. No wheezing or rales.   Abdominal:      General: Abdomen is flat. There is no distension.      Palpations: Abdomen is soft. There is no mass.      Tenderness: There is no abdominal tenderness. There is no guarding or rebound.   Musculoskeletal:         General: No  tenderness.      Cervical back: Normal range of motion and neck supple.   Skin:     General: Skin is warm and dry.      Coloration: Skin is not pale.      Findings: No erythema or rash.   Neurological:      General: No focal deficit present.      Mental Status: She is alert.   Psychiatric:         Mood and Affect: Mood normal.         ED Course  39-year-old female with known pericardial cyst with plans for appointment with thoracic surgery tomorrow who presents feeling dizzy and noticed her heart rate was elevated at home once.  Here workup appears benign.  Normal troponin, normal D-dimer, chest x-ray unchanged.  Heart rate came down into the 80s.  She is up ambulating without difficulty.  She appears stable for discharge home tonight with follow-up tomorrow with thoracic surgery as planned.        Procedures         EKG revealed sinus tachycardia at 101 bpm.  No acute ST segment or T wave changes noted.  Interpreted by myself.       Results for orders placed or performed during the hospital encounter of 07/30/24 (from the past 24 hour(s))   CBC with platelets differential    Narrative    The following orders were created for panel order CBC with platelets differential.  Procedure                               Abnormality         Status                     ---------                               -----------         ------                     CBC with platelets and d...[[id number]]                      Final result                 Please view results for these tests on the individual orders.   D dimer quantitative   Result Value Ref Range    D-Dimer Quantitative 0.45 0.00 - 0.50 ug/mL FEU    Narrative    This D-dimer assay is intended for use in conjunction with a clinical pretest probability assessment model to exclude pulmonary embolism (PE) and deep venous thrombosis (DVT) in outpatients suspected of PE or DVT. The cut-off value is 0.50 ug/mL FEU.   Basic metabolic panel   Result Value Ref Range    Sodium 139 135 -  145 mmol/L    Potassium 3.9 3.4 - 5.3 mmol/L    Chloride 103 98 - 107 mmol/L    Carbon Dioxide (CO2) 24 22 - 29 mmol/L    Anion Gap 12 7 - 15 mmol/L    Urea Nitrogen 12.5 6.0 - 20.0 mg/dL    Creatinine 0.53 0.51 - 0.95 mg/dL    GFR Estimate >90 >60 mL/min/1.73m2    Calcium 9.2 8.8 - 10.4 mg/dL    Glucose 92 70 - 99 mg/dL   Troponin T, High Sensitivity   Result Value Ref Range    Troponin T, High Sensitivity <6 <=14 ng/L   CBC with platelets and differential   Result Value Ref Range    WBC Count 9.0 4.0 - 11.0 10e3/uL    RBC Count 4.59 3.80 - 5.20 10e6/uL    Hemoglobin 13.5 11.7 - 15.7 g/dL    Hematocrit 39.4 35.0 - 47.0 %    MCV 86 78 - 100 fL    MCH 29.4 26.5 - 33.0 pg    MCHC 34.3 31.5 - 36.5 g/dL    RDW 12.8 10.0 - 15.0 %    Platelet Count 374 150 - 450 10e3/uL    % Neutrophils 60 %    % Lymphocytes 31 %    % Monocytes 5 %    % Eosinophils 3 %    % Basophils 1 %    % Immature Granulocytes 0 %    NRBCs per 100 WBC 0 <1 /100    Absolute Neutrophils 5.4 1.6 - 8.3 10e3/uL    Absolute Lymphocytes 2.8 0.8 - 5.3 10e3/uL    Absolute Monocytes 0.5 0.0 - 1.3 10e3/uL    Absolute Eosinophils 0.3 0.0 - 0.7 10e3/uL    Absolute Basophils 0.1 0.0 - 0.2 10e3/uL    Absolute Immature Granulocytes 0.0 <=0.4 10e3/uL    Absolute NRBCs 0.0 10e3/uL   XR Chest 2 Views    Narrative    EXAM: XR CHEST 2 VIEWS  LOCATION: [facility]  DATE: 7/30/2024    INDICATION: Chest pain.  COMPARISON: Coronary CTA 7/19/2024, CT chest 7/17/2024      Impression    IMPRESSION: Heart size and vascularity are normal. Pericardial cyst at the right cardiophrenic sulcus redemonstrated. No focal consolidation, pneumothorax nor pleural effusion. Bilateral breast implants redemonstrated.       Medications - No data to display    Assessments & Plan (with Medical Decision Making)     I have reviewed the nursing notes.    I have reviewed the findings, diagnosis, plan and need for follow up with the patient.          New Prescriptions     No medications on file       Final diagnoses:   Chest pain, unspecified type       7/30/2024   [facility] EMERGENCY DEPT       Jordi Lucas MD  07/30/24 4506

## 2024-07-31 ENCOUNTER — ONCOLOGY VISIT (OUTPATIENT)
Dept: SURGERY | Facility: CLINIC | Age: 39
End: 2024-07-31
Attending: THORACIC SURGERY (CARDIOTHORACIC VASCULAR SURGERY)
Payer: COMMERCIAL

## 2024-07-31 ENCOUNTER — TELEPHONE (OUTPATIENT)
Dept: DERMATOLOGY | Facility: CLINIC | Age: 39
End: 2024-07-31
Payer: COMMERCIAL

## 2024-07-31 VITALS
BODY MASS INDEX: 28.3 KG/M2 | SYSTOLIC BLOOD PRESSURE: 116 MMHG | TEMPERATURE: 98.9 F | DIASTOLIC BLOOD PRESSURE: 73 MMHG | RESPIRATION RATE: 16 BRPM | OXYGEN SATURATION: 98 % | HEART RATE: 58 BPM | WEIGHT: 144.9 LBS

## 2024-07-31 DIAGNOSIS — Q24.8 PERICARDIAL CYST ALONG RIGHT CARDIOPHRENIC ANGLE: Primary | ICD-10-CM

## 2024-07-31 PROCEDURE — 99212 OFFICE O/P EST SF 10 MIN: CPT | Performed by: THORACIC SURGERY (CARDIOTHORACIC VASCULAR SURGERY)

## 2024-07-31 PROCEDURE — 99204 OFFICE O/P NEW MOD 45 MIN: CPT | Performed by: THORACIC SURGERY (CARDIOTHORACIC VASCULAR SURGERY)

## 2024-07-31 ASSESSMENT — PAIN SCALES - GENERAL: PAINLEVEL: SEVERE PAIN (6)

## 2024-07-31 NOTE — PROGRESS NOTES
THORACIC SURGERY - NEW PATIENT OFFICE VISIT      Dear Margaret Aragon PA-C,    I saw Shayla Mcclure at Dr. Scherer s request in consultation for the evaluation and treatment of a symptomatic RIGHT pericardial cyst.     HPI  Shayla Mcclure is a 39 year old female who has been feeling fatigued and had a dry cough for 6-8 weeks. She feels some chest pressure, and dyspnea on exertion as well as some orthopnea. Additionally, her HR can oscillate between 40 and 170 without any triggering factors, even if she is resting. When she is bradycardic she feels lightheaded. She was admitted for 2 days in mid-July and cardiac work-up was negative for ischemia, CHF, and pericarditis. The symptoms have been attributed to the cyst for lack of an alternative explanation.    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    History of severe pre-eclampsia     delivered at 27w3d    Leiomyoma     prior laparotomy for two large fibroids         PSH  Reviewed, as below    Past Surgical History:   Procedure Laterality Date    C/SECTION, LOW TRANSVERSE      delivered at 27w3d for pre-eclampsia with severe features     COSMETIC MAMMOPLASTY AUGMENTATION BILATERAL      when 16 years old    DILATION AND CURETTAGE SUCTION N/A 04/16/2020    Procedure: DILATION AND CURETTAGE, UTERUS, USING SUCTION;  Surgeon: Haydee Mcpherson DO;  Location:  OR    DILATION AND CURETTAGE SUCTION  09/23/2021    repeat procedure for retained POC after prior suction D&C 09/20/2021    DILATION AND CURETTAGE SUCTION N/A 9/20/2021    Procedure: DILATION AND CURETTAGE, UTERUS, USING SUCTION;  Surgeon: Haydee Mcpherson DO;  Location: [facility] OR    MYOMECTOMY UTERUS  2012    open myomectomy in [place], reported very large fibroids        ETOH neg  TOB neg    Physical examination  BMI /73 (BP Location: Left arm, Patient  Position: Left side, Cuff Size: Adult Regular)   Pulse 58   Temp 98.9  F (37.2  C) (Oral)   Resp 16   Wt 65.7 kg (144 lb 14.4 oz)   SpO2 98%   BMI 28.30 kg/m      Healthy appearing female      From a personal perspective, she is from [facility]. She has 2 kids, a 9 y old daughter with mucopolysacharidosis and a 18 month old healthy boy. He daughter requires intense care.      Code Status: Full Code    Health Care Proxy:    IMPRESSION (Q24.8) Pericardial cyst along right cardiophrenic angle  (primary encounter diagnosis)      This person is a 39 year old female with a symptomatic RIGHT pericardial cyst.          PLAN  I spent 45 min on the date of the encounter in chart review, patient visit, review of tests, documentation and/or discussion with other providers about the issues documented above. I reviewed the plan as follows:    We discussed her symptoms, and that for lack of a different explanation, we are attributing the symptoms to her pericardial cyst. Her symptoms are compatible with a pericardial cyst, particularly since it is large. We discussed the option of percutaneous drainage first, but she would rather go straight to a resection.    Procedure planned: RIGHT VATS resection of pericardial cyst. I reviewed the indications, risks, and benefits of the procedure with Ms. Shayla Mcclure. We discussed the intraoperative risks of bleeding and injury to vital organs, potential postoperative complications including, but not limited to, major respiratory events, arrhythmia, bleeding, infection, reoperation, and death. I explained the anticipated hospital course (+/- 1 days) and postoperative recovery including pain control, chest drain management, and variable degrees of dyspnea (or need for supplemental oxygen) and fatigue that tend to get better with time.     Necessary Preop Tests & Appointments: Preoperative assessment clinic    Regional Anesthesia Plan: Intraoperative intercostal nerve  block    Anticoagulation Plan: Pneumatic compression stockings      I appreciate the opportunity to participate in the care of your patient and will keep you updated.      Sincerely,    Gideon Huerta MD

## 2024-07-31 NOTE — TELEPHONE ENCOUNTER
Pt read [employer] message and will call the clinic with any questions or concerns.     Angela Garcia RN on 7/31/2024 at 9:06 AM      Final Diagnosis  A(1). Skin, left postauricular area, shave:  - Macular seborrheic keratosis - (see description)        Electronically signed by Trever De MD on 7/30/2024 at  3:58 PM          Genie Mcguire,     Your biopsy results from the left postauricular area showed a benign, macular seborrheic keratosis. No further treatment is needed at site. Continue the wound care until the biopsy site is fully healed. If you have any further questions or concerns please send me a message.     Dominique Mon PA-C  [facility]  Dermatology  Written by Dominique Mon PA-C on 7/31/2024  8:31 AM CDT  Seen by patient Shayla GIFFORD Ren on 7/31/2024  8:33 AM

## 2024-07-31 NOTE — LETTER
7/31/2024      Shayla Mcclure  [address] [facility] [address]      Dear Colleague,    Thank you for referring your patient, Shayla Mcclure, to the [facility]ER [facility]. Please see a copy of my visit note below.    THORACIC SURGERY - NEW PATIENT OFFICE VISIT      Dear Margaret Aragon PA-C,    I saw Shayla Mcclure at Dr. Gilmer gotti request in consultation for the evaluation and treatment of a symptomatic RIGHT pericardial cyst.     HPI  Shayla Mcclure is a 39 year old female who has been feeling fatigued and had a dry cough for 6-8 weeks. She feels some chest pressure, and dyspnea on exertion as well as some orthopnea. Additionally, her HR can oscillate between 40 and 170 without any triggering factors, even if she is resting. When she is bradycardic she feels lightheaded. She was admitted for 2 days in mid-July and cardiac work-up was negative for ischemia, CHF, and pericarditis. The symptoms have been attributed to the cyst for lack of an alternative explanation.    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     History of severe pre-eclampsia     delivered at 27w3d     Leiomyoma     prior laparotomy for two large fibroids         PSH  Reviewed, as below    Past Surgical History:   Procedure Laterality Date     C/SECTION, LOW TRANSVERSE      delivered at 27w3d for pre-eclampsia with severe features      COSMETIC MAMMOPLASTY AUGMENTATION BILATERAL      when 16 years old     DILATION AND CURETTAGE SUCTION N/A 04/16/2020    Procedure: DILATION AND CURETTAGE, UTERUS, USING SUCTION;  Surgeon: Haydee Mcpherson DO;  Location:  OR     DILATION AND CURETTAGE SUCTION  09/23/2021    repeat procedure for retained POC after prior suction D&C 09/20/2021     DILATION AND CURETTAGE SUCTION N/A 9/20/2021    Procedure: DILATION AND CURETTAGE, UTERUS, USING  SUCTION;  Surgeon: Haydee Mcpherson DO;  Location: [facility] OR     MYOMECTOMY UTERUS  2012    open myomectomy in [place], reported very large fibroids        ETOH neg  TOB neg    Physical examination  BMI /73 (BP Location: Left arm, Patient Position: Left side, Cuff Size: Adult Regular)   Pulse 58   Temp 98.9  F (37.2  C) (Oral)   Resp 16   Wt 65.7 kg (144 lb 14.4 oz)   SpO2 98%   BMI 28.30 kg/m      Healthy appearing female      From a personal perspective, she is from [facility]. She has 2 kids, a 9 y old daughter with mucopolysacharidosis and a 18 month old healthy boy. He daughter requires intense care.      Code Status: Full Code    Health Care Proxy:    IMPRESSION (Q24.8) Pericardial cyst along right cardiophrenic angle  (primary encounter diagnosis)      This person is a 39 year old female with a symptomatic RIGHT pericardial cyst.          PLAN  I spent 45 min on the date of the encounter in chart review, patient visit, review of tests, documentation and/or discussion with other providers about the issues documented above. I reviewed the plan as follows:    We discussed her symptoms, and that for lack of a different explanation, we are attributing the symptoms to her pericardial cyst. Her symptoms are compatible with a pericardial cyst, particularly since it is large. We discussed the option of percutaneous drainage first, but she would rather go straight to a resection.    Procedure planned: RIGHT VATS resection of pericardial cyst. I reviewed the indications, risks, and benefits of the procedure with Ms. Shayla Mcclure. We discussed the intraoperative risks of bleeding and injury to vital organs, potential postoperative complications including, but not limited to, major respiratory events, arrhythmia, bleeding, infection, reoperation, and death. I explained the anticipated hospital course (+/- 1 days) and postoperative recovery including pain control, chest drain management, and variable degrees of  dyspnea (or need for supplemental oxygen) and fatigue that tend to get better with time.     Necessary Preop Tests & Appointments: Preoperative assessment clinic    Regional Anesthesia Plan: Intraoperative intercostal nerve block    Anticoagulation Plan: Pneumatic compression stockings      I appreciate the opportunity to participate in the care of your patient and will keep you updated.      Sincerely,    Gideon Huerta MD         Again, thank you for allowing me to participate in the care of your patient.        Sincerely,        Gideon Huerta MD

## 2024-07-31 NOTE — NURSING NOTE
Oncology Rooming Note    July 31, 2024 12:00 PM   Shayla Mcclure is a 39 year old female who presents for:    Chief Complaint   Patient presents with    Oncology Clinic Visit     RTN post hospital follow up      Initial Vitals: /73 (BP Location: Left arm, Patient Position: Left side, Cuff Size: Adult Regular)   Pulse 58   Temp 98.9  F (37.2  C) (Oral)   Resp 16   Wt 65.7 kg (144 lb 14.4 oz)   SpO2 98%   BMI 28.30 kg/m   Estimated body mass index is 28.3 kg/m  as calculated from the following:    Height as of 7/30/24: 1.524 m (5').    Weight as of this encounter: 65.7 kg (144 lb 14.4 oz). Body surface area is 1.67 meters squared.  Severe Pain (6) Comment: Data Unavailable   No LMP recorded.  Allergies reviewed: Yes  Medications reviewed: Yes    Medications: Medication refills not needed today.  Pharmacy name entered into EPIC:    [facility] [place], [place] - [address]  [facility] [place], [place] - [address] [facility] [place] - [address] [place] [address] [place] [address] [id number] [address] [facility] [address] [place] [address] [address] [facility] [phone]  [facility] #[id number] [place], [place] - [address] [facility] AT SEC OF [facility] [place], [place] - [address], [id number]    Frailty Screening:   Is the patient here for a new oncology consult visit in cancer care? 2. No      Clinical concerns: none      Alonzo Shrestha

## 2024-08-01 ENCOUNTER — TELEPHONE (OUTPATIENT)
Dept: SURGERY | Facility: CLINIC | Age: 39
End: 2024-08-01
Payer: COMMERCIAL

## 2024-08-01 NOTE — TELEPHONE ENCOUNTER
Spoke with patient to schedule procedure with Dr. Huerta   Procedure was scheduled on 8/19 at [facility] OR  Patient will have H&P with PAC    Informed pt of surgery details:  Date:    Monday, August 19, 2024  Arrival Time:  8:25 am    Pt is aware surgery start time may change, and someone will reach out with the new arrival time, if so.    Patient is aware a COVID-19 test is needed before their procedure ONLY IF symptomatic.   (Patient is aware Thoracic is no longer requiring COVID-19 test)       Patient is aware a / is needed day of surgery.   Surgery Letter was sent via [employer],     Patient has my direct contact information for any further questions.

## 2024-08-02 ENCOUNTER — PREP FOR PROCEDURE (OUTPATIENT)
Dept: SURGERY | Facility: CLINIC | Age: 39
End: 2024-08-02
Payer: COMMERCIAL

## 2024-08-02 DIAGNOSIS — Q24.8 PERICARDIAL CYST ALONG RIGHT CARDIOPHRENIC ANGLE: Primary | ICD-10-CM

## 2024-08-02 RX ORDER — ENOXAPARIN SODIUM 100 MG/ML
40 INJECTION SUBCUTANEOUS
Status: CANCELLED | OUTPATIENT
Start: 2024-08-02

## 2024-08-02 RX ORDER — ACETAMINOPHEN 325 MG/1
975 TABLET ORAL ONCE
Status: CANCELLED | OUTPATIENT
Start: 2024-08-02 | End: 2024-08-02

## 2024-08-02 NOTE — TELEPHONE ENCOUNTER
FUTURE VISIT INFORMATION      SURGERY INFORMATION:  Dr. Huerta    Consult: ov 24    RECORDS REQUESTED FROM:       Primary Care Provider: Margaret Aragon PA-C - [email or web address]    Most recent EKG+ Tracin24    Most recent ECHO: 24

## 2024-08-08 LAB
ABO/RH(D): NORMAL
ANTIBODY SCREEN: NEGATIVE
SPECIMEN EXPIRATION DATE: NORMAL

## 2024-08-09 ENCOUNTER — OFFICE VISIT (OUTPATIENT)
Dept: SURGERY | Facility: CLINIC | Age: 39
End: 2024-08-09
Payer: COMMERCIAL

## 2024-08-09 ENCOUNTER — ANESTHESIA EVENT (OUTPATIENT)
Dept: SURGERY | Facility: CLINIC | Age: 39
DRG: 272 | End: 2024-08-09
Payer: COMMERCIAL

## 2024-08-09 ENCOUNTER — LAB (OUTPATIENT)
Dept: LAB | Facility: CLINIC | Age: 39
End: 2024-08-09
Payer: COMMERCIAL

## 2024-08-09 ENCOUNTER — PRE VISIT (OUTPATIENT)
Dept: SURGERY | Facility: CLINIC | Age: 39
End: 2024-08-09

## 2024-08-09 ENCOUNTER — APPOINTMENT (OUTPATIENT)
Dept: LAB | Facility: CLINIC | Age: 39
End: 2024-08-09
Payer: COMMERCIAL

## 2024-08-09 VITALS
TEMPERATURE: 97.9 F | HEIGHT: 60 IN | DIASTOLIC BLOOD PRESSURE: 73 MMHG | BODY MASS INDEX: 28.27 KG/M2 | WEIGHT: 144 LBS | SYSTOLIC BLOOD PRESSURE: 108 MMHG | HEART RATE: 69 BPM | OXYGEN SATURATION: 99 %

## 2024-08-09 DIAGNOSIS — Q24.8 PERICARDIAL CYST: ICD-10-CM

## 2024-08-09 DIAGNOSIS — Z01.818 PREOP EXAMINATION: Primary | ICD-10-CM

## 2024-08-09 DIAGNOSIS — Z01.818 PREOP EXAMINATION: ICD-10-CM

## 2024-08-09 PROCEDURE — 99203 OFFICE O/P NEW LOW 30 MIN: CPT | Performed by: PHYSICIAN ASSISTANT

## 2024-08-09 PROCEDURE — 86900 BLOOD TYPING SEROLOGIC ABO: CPT

## 2024-08-09 PROCEDURE — 36415 COLL VENOUS BLD VENIPUNCTURE: CPT | Performed by: PATHOLOGY

## 2024-08-09 ASSESSMENT — LIFESTYLE VARIABLES: TOBACCO_USE: 0

## 2024-08-09 ASSESSMENT — ENCOUNTER SYMPTOMS: SEIZURES: 0

## 2024-08-09 ASSESSMENT — PAIN SCALES - GENERAL: PAINLEVEL: NO PAIN (0)

## 2024-08-09 NOTE — H&P
Pre-Operative H & P     CC:  Preoperative exam to assess for increased cardiopulmonary risk while undergoing surgery and anesthesia.    Date of Encounter: 8/9/2024  Primary Care Physician:  Margaret Aragon     Reason for visit:   Encounter Diagnoses   Name Primary?    Preop examination Yes    Pericardial cyst        HPI  Shayla Mcclure is a 39 year old female who presents for pre-operative H & P in preparation for  Procedure Information       Case: [id number] Date/Time: 08/19/24 1025    Procedure: RIGHT THORACOSCOPIC RESECTION of a pericardial cyst (Right: Chest)    Anesthesia type: General    Diagnosis: Pericardial cyst along right cardiophrenic angle [Q24.8]    Pre-op diagnosis: Pericardial cyst along right cardiophrenic angle [Q24.8]    Location: [facility] OR  /  OR    Providers: Gideon Huerta MD            Ms. 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 She feels some chest pressure, and dyspnea on exertion as well as some orthopnea. Additionally, her HR can oscillate between 40 and 170 without any triggering factors, even if she is resting. When she is bradycardic she feels lightheaded. She was admitted for 2 days in mid-July and cardiac work-up was negative for ischemia, CHF, and pericarditis. The symptoms have been attributed to the cyst for lack of an alternative explanation. She met with Dr. Huerta and the above is planned.    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    History of severe pre-eclampsia     delivered at 27w3d    Leiomyoma     prior laparotomy for two large fibroids        Past Surgical History  Past Surgical History:   Procedure Laterality Date    C/SECTION, LOW TRANSVERSE       delivered at 27w3d for pre-eclampsia with severe features     COSMETIC MAMMOPLASTY AUGMENTATION BILATERAL      when 16 years old    DILATION AND CURETTAGE SUCTION N/A 04/16/2020    Procedure: DILATION AND CURETTAGE, UTERUS, USING SUCTION;  Surgeon: Haydee Mcpherson DO;  Location:  OR    DILATION AND CURETTAGE SUCTION  09/23/2021    repeat procedure for retained POC after prior suction D&C 09/20/2021    DILATION AND CURETTAGE SUCTION N/A 9/20/2021    Procedure: DILATION AND CURETTAGE, UTERUS, USING SUCTION;  Surgeon: Haydee Mcpherson DO;  Location:  OR    MYOMECTOMY UTERUS  2012    open myomectomy in [place], reported very large fibroids       Prior to Admission Medications  Current Outpatient Medications   Medication Sig Dispense Refill    tretinoin (RETIN-A) 0.025 % external cream Apply a pea-sized amount evenly over face at nighttime before bed. Start using every other night. 45 g 11       Allergies  Allergies   Allergen Reactions    Clindamycin        Social History  Social History     Socioeconomic History    Marital status:      Spouse name: Not on file    Number of children: Not on file    Years of education: Not on file    Highest education level: Not on file   Occupational History    Occupation: homemaker   Tobacco Use    Smoking status: Never     Passive exposure: Never    Smokeless tobacco: Never   Vaping Use    Vaping status: Never Used   Substance and Sexual Activity    Alcohol use: Yes     Comment: only birthdays and holidays    Drug use: No    Sexual activity: Yes     Partners: Male     Birth control/protection: None   Other Topics Concern    Parent/sibling w/ CABG, MI or angioplasty before 65F 55M? Not Asked   Social History Narrative    Patient and her  moved to [place] (from [place] via [place]) for their daughter with Scooter's Syndrome.     Very happy in [place].     Silvia Leyva     Social Determinants of Health     Financial Resource Strain: Patient Declined (4/12/2023)     Received from [facility]    Overall Financial Resource Strain (CARDIA)     Difficulty of Paying Living Expenses: Patient declined   Food Insecurity: No Food Insecurity (4/12/2023)    Received from [facility]    Hunger Vital Sign     Worried About Running Out of Food in the Last Year: Never true     Ran Out of Food in the Last Year: Never true   Transportation Needs: No Transportation Needs (4/12/2023)    Received from [facility]    PRAPARE - Transportation     Lack of Transportation (Medical): No     Lack of Transportation (Non-Medical): No   Physical Activity: Insufficiently Active (4/12/2023)    Received from [facility]    Exercise Vital Sign     Days of Exercise per Week: 2 days     Minutes of Exercise per Session: 10 min   Stress: No Stress Concern Present (4/12/2023)    Received from [facility]    [employer] [place] [employer] - Occupational Stress Questionnaire     Feeling of Stress : Only a little   Social Connections: Moderately Isolated (4/12/2023)    Received from [facility]    Social Connection and Isolation Panel [NHANES]     Frequency of Communication with Friends and Family: Once a week     Frequency of Social Gatherings with Friends and Family: Never     Attends Gn[employer]ist Services: 1 to 4 times per year     Active Member of Clubs or Organizations: No     Attends Club or Organization Meetings: Never     Marital Status:    Interpersonal Safety: Not At Risk (4/12/2023)    Received from [facility]    Humiliation, Afraid, Rape, and Kick questionnaire     Fear of Current or Ex-Partner: No     Emotionally Abused: No     Physically Abused: No     Sexually Abused: No   Housing Stability: Unknown (4/12/2023)    Received from [facility]    Housing Stability Vital Sign     Unable to Pay for Housing in the Last Year: Patient refused     Number of Places Lived in the Last Year: 2      Unstable Housing in the Last Year: No       Family History  Family History   Problem Relation Age of Onset    Other - See Comments Mother         heavy menstrual cycles    Hashimoto's thyroiditis Mother     Lupus Father     Leukemia Maternal Grandmother     Lupus Maternal Aunt     Cancer No family hx of         Scooter's Syndrome    Anesthesia Reaction No family hx of     Venous thrombosis No family hx of        Review of Systems  The complete review of systems is negative other than noted in the HPI or here.     Anesthesia Evaluation   Pt has had prior anesthetic.     No history of anesthetic complications       ROS/MED HX  ENT/Pulmonary:  - neg pulmonary ROS  (-) tobacco use and asthma   Neurologic:  - neg neurologic ROS  (-) no seizures and no CVA   Cardiovascular: Comment: Pericardial cyst    (+)  - -   -  - -           CARMICHAEL.                      Previous cardiac testing   Echo: Date: 7/18/24 Results:  Interpretation Summary  Global and regional left ventricular function is normal with an EF of 60-65%.  Global right ventricular function is normal.  No significant valvular abnormalities present.  Pulmonary artery systolic pressure is normal.  Estimated mean right atrial pressure is normal.  No pericardial effusion is present.    Stress Test:  Date: Results:    ECG Reviewed:  Date: Results:    Cath:  Date: 7/19/24 Results:  CTA angiogram  1.  Normal coronary arteries without detectable atherosclerosis or  stenosis.  2.  Total Agatston score 0 placing the patient in the lowest  percentile when compared to an age- and gender-matched control group.  3.  Known right-sided pericardial cyst, unchanged from the prior  non-cardiac CT dated 7/17/2024.  4.  Please review the separate Radiology report for incidental  noncardiac findings.      METS/Exercise Tolerance: >4 METS    Hematologic:  - neg hematologic  ROS  (-) history of blood clots and history of blood transfusion   Musculoskeletal:  - neg musculoskeletal ROS      GI/Hepatic:    (-) GERD and liver disease   Renal/Genitourinary:  - neg Renal ROS     Endo:  - neg endo ROS     Psychiatric/Substance Use:  - neg psychiatric ROS     Infectious Disease:  - neg infectious disease ROS     Malignancy:  - neg malignancy ROS     Other:  - neg other ROS          /73 (BP Location: Right arm, Patient Position: Sitting, Cuff Size: Adult Regular)   Pulse 69   Temp 97.9  F (36.6  C) (Oral)   Ht 1.524 m (5')   Wt 65.3 kg (144 lb)   LMP 07/16/2024   SpO2 99%   Breastfeeding No   BMI 28.12 kg/m      Physical Exam   Constitutional: Awake, alert, cooperative, no apparent distress, and appears stated age.  Eyes: Pupils equal, round and reactive to light, extra ocular muscles intact, sclera clear, conjunctiva normal.  HENT: Normocephalic, oral pharynx with moist mucus membranes, good dentition. No goiter appreciated.   Respiratory: Clear to auscultation bilaterally, no crackles or wheezing.  Cardiovascular: Regular rate and rhythm, normal S1 and S2, and no murmur noted. No edema. Palpable pulses to radial and PT arteries.   GI: Not assessed  Lymph/Hematologic: No cervical lymphadenopathy and no supraclavicular lymphadenopathy.  Genitourinary:  deferred  Skin: Warm and dry.    Musculoskeletal: Full ROM of neck. There is no redness, warmth, or swelling of the joints. Gross motor strength is normal.    Neurologic: Awake, alert, oriented to name, place and time. Cranial nerves II-XII are grossly intact. Gait is normal.   Neuropsychiatric: Calm, cooperative. Normal affect.     Prior Labs/Diagnostic Studies   All labs and imaging personally reviewed     Component      Latest Ref Rng 7/30/2024  5:59 PM   WBC      4.0 - 11.0 10e3/uL 9.0    RBC Count      3.80 - 5.20 10e6/uL 4.59    Hemoglobin      11.7 - 15.7 g/dL 13.5    Hematocrit      35.0 - 47.0 % 39.4    MCV      78 - 100 fL 86    MCH      26.5 - 33.0 pg 29.4    MCHC      31.5 - 36.5 g/dL 34.3    RDW      10.0 - 15.0 % 12.8    Platelet  Count      150 - 450 10e3/uL 374    % Neutrophils      % 60    % Lymphocytes      % 31    % Monocytes      % 5    % Eosinophils      % 3    % Basophils      % 1    % Immature Granulocytes      % 0    NRBCs per 100 WBC      <1 /100 0    Absolute Neutrophils      1.6 - 8.3 10e3/uL 5.4    Absolute Lymphocytes      0.8 - 5.3 10e3/uL 2.8    Absolute Monocytes      0.0 - 1.3 10e3/uL 0.5    Absolute Eosinophils      0.0 - 0.7 10e3/uL 0.3    Absolute Basophils      0.0 - 0.2 10e3/uL 0.1    Absolute Immature Granulocytes      <=0.4 10e3/uL 0.0    Absolute NRBCs      10e3/uL 0.0        Component      Latest Ref Rng 7/30/2024  5:59 PM   Sodium      135 - 145 mmol/L 139    Potassium      3.4 - 5.3 mmol/L 3.9    Chloride      98 - 107 mmol/L 103    Carbon Dioxide (CO2)      22 - 29 mmol/L 24    Anion Gap      7 - 15 mmol/L 12    Urea Nitrogen      6.0 - 20.0 mg/dL 12.5    Creatinine      0.51 - 0.95 mg/dL 0.53    GFR Estimate      >60 mL/min/1.73m2 >90    Calcium      8.8 - 10.4 mg/dL 9.2    Glucose      70 - 99 mg/dL 92          EKG/ stress test - if available please see in ROS above   Echo result w/o MOPS: Interpretation SummaryGlobal and regional left ventricular function is normal with an EF of 60-65%.Global right ventricular function is normal.No significant valvular abnormalities present.Pulmonary artery systolic pressure is normal.Estimated mean right atrial pressure is normal.No pericardial effusion is present.      The patient's records and results personally reviewed by this provider.     Outside records reviewed from: Care Everywhere        Assessment    Shayla Mcclure is a 39 year old female seen as a PAC referral for risk assessment and optimization for anesthesia.    Plan/Recommendations  Pt will be optimized for the proposed procedure.  See below for details on the assessment, risk, and preoperative recommendations    NEUROLOGY  - No history of TIA, CVA or seizure    -Post Op delirium risk factors:  No risk  identified    ENT  - No current airway concerns.  Will need to be reassessed day of surgery.  Mallampati: I  TM: > 3    CARDIAC  - pericardial cyst  - work up negative for CHF, CAD, pericarditis    - METS (Metabolic Equivalents)  Patient performs 4 or more METS exercise without symptoms             Total Score: 0      RCRI-Low risk: Class 2 0.9% complication rate             Total Score: 1    RCRI: High Risk Surgery        PULMONARY    LOY Low Risk             Total Score: 0      - Denies asthma or inhaler use    - Tobacco History    History   Smoking Status    Never   Smokeless Tobacco    Never       GI  - denies GERD  PONV High Risk  Total Score: 3           1 AN PONV: Pt is Female    1 AN PONV: Patient is not a current smoker    1 AN PONV: Intended Post Op Opioids        /RENAL  - Baseline Creatinine  0.53    ENDOCRINE    - BMI: Estimated body mass index is 28.12 kg/m  as calculated from the following:    Height as of this encounter: 1.524 m (5').    Weight as of this encounter: 65.3 kg (144 lb).  Overweight (BMI 25.0-29.9)  - No history of Diabetes Mellitus    HEME  VTE Low Risk 0.26%             Total Score: 0      - No history of abnormal bleeding or antiplatelet use.        Different anesthesia methods/types have been discussed with the patient, but they are aware that the final plan will be decided by the assigned anesthesia provider on the date of service.      The patient is optimized for their procedure. AVS with information on surgery time/arrival time, meds and NPO status given by nursing staff. No further diagnostic testing indicated.      On the day of service:     Prep time: 6 minutes  Visit time: 5 minutes  Documentation time: 10 minutes  ------------------------------------------  Total time: 21 minutes      Delmis Decker PA-C  Preoperative Assessment Center  [facility]  Clinic and Surgery Center  Phone: [phone]  Fax: [phone]

## 2024-08-09 NOTE — PATIENT INSTRUCTIONS
Preparing for Your Surgery      Name:  Shayla Mcclure   MRN:  [id number]   :  1985   Today's Date:  2024       Arriving for surgery:  Surgery date:  24  Arrival time:  8.25AM    Please come to:     Please come to:       [facility] [place]    [address] [place]  [address]     The [facility] [place] Patient/Visitor Ramp is at [address]. Patients and visitors who self-park will receive the reduced hospital parking rate. If the Patient /Visitor Ramp is full, please follow the signs to the [employer] car park located at the main hospital entrance.       parking is available (24 hours/ 7 days a week)      Discounted parking pass options are available for patients and visitors. They can be purchased at the [employer] desk at the main hospital entrance.     -    Stop at the security desk and they will direct surgery patients to the [facility] [phone]        - If you need directions, a wheelchair or an escort please stop at the Information/security desk in the lobby.     What can I eat or drink?  -  You may eat and drink normally up to 8 hours prior to arrival time. (Until 12.25AM)  -  You may have clear liquids until 2 hours prior to arrival time. (Until 6.25AM)    Examples of clear liquids:  Water  Clear broth  Juices (apple, white grape, white cranberry  and cider) without pulp  Noncarbonated, powder based beverages  (lemonade and Jacques-Aid)  Sodas (Sprite, 7-Up, ginger ale and seltzer)  Coffee or tea (without milk or cream)  Gatorade    -  No Alcohol or cannabis products for at least 24 hours before surgery.     Which medicines can I take?    Hold Aspirin for 7 days before surgery.   Hold Multivitamins for 7 days before surgery.  Hold Supplements for 7 days before surgery.  Hold Ibuprofen (Advil, Motrin) for 1 day(s) before surgery--unless otherwise  directed by surgeon.  Hold Naproxen (Aleve) for 4 days before surgery.    -  DO NOT take these medications the day of surgery:  External cream    -  PLEASE TAKE these medications the day of surgery:  None    How do I prepare myself?  - Please take 2 showers (one the night prior to surgery and one the morning of surgery) using Scrubcare or Hibiclens soap.    Use this soap only from the neck to your toes.     Leave the soap on your skin for one minute--then rinse thoroughly.      You may use your own shampoo and conditioner. No other hair products.   - Please remove all jewelry and body piercings.  - No lotions, deodorants or fragrance.  - No makeup or fingernail polish.   - Bring your ID and insurance card.    -If you use a CPAP machine, please bring the CPAP machine, tubing, and mask to hospital.    -If you have a Deep Brain Stimulator, Spinal Cord Stimulator, or any Neuro Stimulator device---you must bring the remote control to the hospital.      ALL PATIENTS GOING HOME THE SAME DAY OF SURGERY ARE REQUIRED TO HAVE A RESPONSIBLE ADULT TO DRIVE AND BE IN ATTENDANCE WITH THEM FOR 24 HOURS FOLLOWING SURGERY.    Covid testing policy as of 12/06/2022  Your surgeon will notify and schedule you for a COVID test if one is needed before surgery--please direct any questions or COVID symptoms to your surgeon      Questions or Concerns:    - For any questions regarding the day of surgery or your hospital stay, please contact the Pre Admission Nursing Office at [phone].       - If you have health changes between today and your surgery, please call your surgeon.       - For questions after surgery, please call your surgeons office.           Current Visitor Guidelines    You may have 2 visitors in the pre op area.    Visiting hours: 8 a.m. to 8:30 p.m.    Patients confirmed or suspected to have symptoms of COVID 19 or flu:     No visitors allowed for adult patients.   Children (under age 18) can have 1 named visitor.      People who are sick or showing symptoms of COVID 19 or flu:    Are not allowed to visit patients--we can only make exceptions in special situations.       Please follow these guidelines for your visit:          Please maintain social distance          Masking is optional--however at times you may be asked to wear a mask for the safety of yourself and others     Clean your hands with alcohol hand . Do this when you arrive at and leave the building and patient room,    And again after you touch your mask or anything in the room.     Go directly to and from the room you are visiting.     Stay in the patient s room during your visit. Limit going to other places in the hospital as much as possible     Leave bags and jackets at home or in the car.     For everyone s health, please don t come and go during your visit. That includes for smoking   during your visit.         Enhanced Recovery After Surgery     This is a team effort, including you, to get you back on your feet, eating and drinking      normally and out of the hospital as quickly as possible.  The goals are: 1) NO INFECTIONS and   2) RETURN TO NORMAL DIET    How can we achieve these goals?  1) STAY ACTIVE: Walk every day before your surgery; try to increase the amount every day.  Walk after surgery as much as you can-the nurses will help you.  Walking speeds healing and gets you home quicker, you heal better at home and have less risk of infection.     2) INCENTIVE SPIROMETER: Practice your incentive spirometer 4 times per day with 5 repetitions each time.  Using the incentive spirometer can strengthen your muscles between your ribs and help you have a strong cough after surgery.  A more effective cough can help prevent problems with your lungs.    3) STAY HYDRATED: Drink clear liquids up until 2 hours prior to arrival. We would like you to purchase a drink such as Gatorade or Ensure Clear (not the milkshake type).  Drink this before bedtime and the  morning of surgery, drink between 8-10 ounces or until you feel hydrated.  Keeping well hydrated leads to your veins being plump, you wake up faster, and you are less likely to be nauseated. Start drinking water as soon as you can after surgery and advance to clear liquids and food as tolerated.  IV fluids contain salt, drinking fluids will minimize the amount of IV fluids you need and decrease the amount of salt you get.    The most common reason for the patient to be readmitted is dehydration. Staying hydrated after you go home from the hospital is very important.  Ensure or Ensure Clear are good options to keep you hydrated.     4) PAIN MANAGEMENT: If we minimize the amount of opioids and narcotics, and use regional blocks (which numb the area where your surgery is) along with oral pain medications; you will have less side effects of nausea and constipation. Narcotics can slow down your bowels and cause you to stay in the hospital longer.     Our goal is to keep you comfortable; eating and drinking normally and back home safely.

## 2024-08-13 ENCOUNTER — PATIENT OUTREACH (OUTPATIENT)
Dept: SURGERY | Facility: CLINIC | Age: 39
End: 2024-08-13
Payer: COMMERCIAL

## 2024-08-13 NOTE — TELEPHONE ENCOUNTER
Patient scheduled for Right Thoracoscopic VATS resection of pericardial cyst with Dr Huerta on 8/19/2024.     Called to review pre-op Education. 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 Reviewed post-operative expectations such as but not limited to estimated hospital length of stay, chest tubes, pain management (including neuropathic pain), activity progression and constipation prevention. Reviewed that patient was  instructed to stop ibuprofen, naproxen, NSAIDS, fish oil/flax seed oil, Vit E, vitamin/mineral/herbal supplements one week before surgery.  NPO guidelines and showering instructions reviewed. Patient is aware she will have an overnoc hospital stay and will need a  after hospital discharge and a responsible caregiver to stay with them for 48 hours afterwards. The patient was instructed to notify the provider if there are any signs of a cold/flu prior to surgery. The patient was instructed to avoid smoking marijuana, vaping, chewing tobacco, or drinking alcohol for 1 week prior to surgery.     The patient was notified of post-op restrictions including: no heavy lifting >/= 5-20 lbs for 2-4 weeks; no drastic changes in air pressure (no flying or scuba diving) for 4-8 weeks. Patient informed she should continue to use their incentive spirometer and ambulation post-op until they feel they have returned to their regular level of activity. Patient is aware she needs to remain well hydrated and eat a protein-rich diet post op. 15 minutes spent reviewing surgical expectations. Questions encouraged and answered questions as able. Patient verbalized understanding and in agreement with plan.     Patient verbalized that her daughter has mucopolysacharidosis requires extensive cares.  States that her cousin and  will be taking time off to care for her daughter while she recovers from surgery. Is inquiring a letter from Dr Huerta can be provided for their employers.      Patient given contact information and will reach out to care coordinator with additional questions or concerns.  The hospital arrival time and estimated time for procedure was reviewed with patient. Patient appreciated writer's call.     Susi Franco RN, BSN  Thoracic Surgery RN Care Coordinator

## 2024-08-19 ENCOUNTER — APPOINTMENT (OUTPATIENT)
Dept: GENERAL RADIOLOGY | Facility: CLINIC | Age: 39
DRG: 272 | End: 2024-08-19
Attending: SURGERY
Payer: COMMERCIAL

## 2024-08-19 ENCOUNTER — HOSPITAL ENCOUNTER (INPATIENT)
Facility: CLINIC | Age: 39
LOS: 1 days | Discharge: HOME | DRG: 272 | End: 2024-08-20
Attending: THORACIC SURGERY (CARDIOTHORACIC VASCULAR SURGERY) | Admitting: THORACIC SURGERY (CARDIOTHORACIC VASCULAR SURGERY)
Payer: COMMERCIAL

## 2024-08-19 ENCOUNTER — ANESTHESIA (OUTPATIENT)
Dept: SURGERY | Facility: CLINIC | Age: 39
DRG: 272 | End: 2024-08-19
Payer: COMMERCIAL

## 2024-08-19 DIAGNOSIS — Q24.8 PERICARDIAL CYST: Primary | ICD-10-CM

## 2024-08-19 DIAGNOSIS — D25.1 INTRAMURAL LEIOMYOMA OF UTERUS: ICD-10-CM

## 2024-08-19 LAB
ABO/RH(D): NORMAL
ANTIBODY SCREEN: NEGATIVE
CREAT SERPL-MCNC: 0.44 MG/DL (ref 0.51–0.95)
EGFRCR SERPLBLD CKD-EPI 2021: >90 ML/MIN/1.73M2
GLUCOSE BLDC GLUCOMTR-MCNC: 92 MG/DL (ref 70–99)
SPECIMEN EXPIRATION DATE: NORMAL

## 2024-08-19 PROCEDURE — 88305 TISSUE EXAM BY PATHOLOGIST: CPT | Mod: TC | Performed by: THORACIC SURGERY (CARDIOTHORACIC VASCULAR SURGERY)

## 2024-08-19 PROCEDURE — 710N000010 HC RECOVERY PHASE 1, LEVEL 2, PER MIN: Performed by: THORACIC SURGERY (CARDIOTHORACIC VASCULAR SURGERY)

## 2024-08-19 PROCEDURE — 250N000011 HC RX IP 250 OP 636: Performed by: NURSE ANESTHETIST, CERTIFIED REGISTERED

## 2024-08-19 PROCEDURE — 250N000013 HC RX MED GY IP 250 OP 250 PS 637: Performed by: SURGERY

## 2024-08-19 PROCEDURE — 120N000002 HC R&B MED SURG/OB UMMC

## 2024-08-19 PROCEDURE — 250N000013 HC RX MED GY IP 250 OP 250 PS 637: Performed by: THORACIC SURGERY (CARDIOTHORACIC VASCULAR SURGERY)

## 2024-08-19 PROCEDURE — 272N000001 HC OR GENERAL SUPPLY STERILE: Performed by: THORACIC SURGERY (CARDIOTHORACIC VASCULAR SURGERY)

## 2024-08-19 PROCEDURE — 250N000011 HC RX IP 250 OP 636: Performed by: ANESTHESIOLOGY

## 2024-08-19 PROCEDURE — 999N000065 XR CHEST PORT 1 VIEW

## 2024-08-19 PROCEDURE — 36415 COLL VENOUS BLD VENIPUNCTURE: CPT | Performed by: SURGERY

## 2024-08-19 PROCEDURE — 88305 TISSUE EXAM BY PATHOLOGIST: CPT | Mod: 26 | Performed by: PATHOLOGY

## 2024-08-19 PROCEDURE — 32661 THORACOSCOPY W/PERICARD EXC: CPT | Performed by: ANESTHESIOLOGY

## 2024-08-19 PROCEDURE — 250N000011 HC RX IP 250 OP 636: Performed by: THORACIC SURGERY (CARDIOTHORACIC VASCULAR SURGERY)

## 2024-08-19 PROCEDURE — 82565 ASSAY OF CREATININE: CPT | Performed by: SURGERY

## 2024-08-19 PROCEDURE — 258N000003 HC RX IP 258 OP 636: Performed by: NURSE ANESTHETIST, CERTIFIED REGISTERED

## 2024-08-19 PROCEDURE — 250N000009 HC RX 250: Performed by: THORACIC SURGERY (CARDIOTHORACIC VASCULAR SURGERY)

## 2024-08-19 PROCEDURE — 250N000011 HC RX IP 250 OP 636: Performed by: SURGERY

## 2024-08-19 PROCEDURE — 32661 THORACOSCOPY W/PERICARD EXC: CPT | Performed by: NURSE ANESTHETIST, CERTIFIED REGISTERED

## 2024-08-19 PROCEDURE — 250N000025 HC SEVOFLURANE, PER MIN: Performed by: THORACIC SURGERY (CARDIOTHORACIC VASCULAR SURGERY)

## 2024-08-19 PROCEDURE — 250N000009 HC RX 250: Performed by: NURSE ANESTHETIST, CERTIFIED REGISTERED

## 2024-08-19 PROCEDURE — 86900 BLOOD TYPING SEROLOGIC ABO: CPT | Performed by: THORACIC SURGERY (CARDIOTHORACIC VASCULAR SURGERY)

## 2024-08-19 PROCEDURE — 360N000077 HC SURGERY LEVEL 4, PER MIN: Performed by: THORACIC SURGERY (CARDIOTHORACIC VASCULAR SURGERY)

## 2024-08-19 PROCEDURE — 999N000141 HC STATISTIC PRE-PROCEDURE NURSING ASSESSMENT: Performed by: THORACIC SURGERY (CARDIOTHORACIC VASCULAR SURGERY)

## 2024-08-19 PROCEDURE — 71045 X-RAY EXAM CHEST 1 VIEW: CPT | Mod: 26 | Performed by: RADIOLOGY

## 2024-08-19 PROCEDURE — 02BN4ZZ EXCISION OF PERICARDIUM, PERCUTANEOUS ENDOSCOPIC APPROACH: ICD-10-PCS | Performed by: THORACIC SURGERY (CARDIOTHORACIC VASCULAR SURGERY)

## 2024-08-19 PROCEDURE — 32661 THORACOSCOPY W/PERICARD EXC: CPT | Mod: GC | Performed by: THORACIC SURGERY (CARDIOTHORACIC VASCULAR SURGERY)

## 2024-08-19 PROCEDURE — 370N000017 HC ANESTHESIA TECHNICAL FEE, PER MIN: Performed by: THORACIC SURGERY (CARDIOTHORACIC VASCULAR SURGERY)

## 2024-08-19 PROCEDURE — 258N000003 HC RX IP 258 OP 636: Performed by: ANESTHESIOLOGY

## 2024-08-19 RX ORDER — ENOXAPARIN SODIUM 100 MG/ML
40 INJECTION SUBCUTANEOUS EVERY 24 HOURS
Status: DISCONTINUED | OUTPATIENT
Start: 2024-08-20 | End: 2024-08-20 | Stop reason: HOSPADM

## 2024-08-19 RX ORDER — ONDANSETRON 4 MG/1
4 TABLET, ORALLY DISINTEGRATING ORAL EVERY 30 MIN PRN
Status: CANCELLED | OUTPATIENT
Start: 2024-08-19

## 2024-08-19 RX ORDER — ALBUTEROL SULFATE 0.83 MG/ML
2.5 SOLUTION RESPIRATORY (INHALATION)
Status: DISCONTINUED | OUTPATIENT
Start: 2024-08-19 | End: 2024-08-20 | Stop reason: HOSPADM

## 2024-08-19 RX ORDER — PROCHLORPERAZINE MALEATE 5 MG
10 TABLET ORAL EVERY 6 HOURS PRN
Status: DISCONTINUED | OUTPATIENT
Start: 2024-08-19 | End: 2024-08-20 | Stop reason: HOSPADM

## 2024-08-19 RX ORDER — ONDANSETRON 4 MG/1
4 TABLET, ORALLY DISINTEGRATING ORAL EVERY 6 HOURS PRN
Status: DISCONTINUED | OUTPATIENT
Start: 2024-08-19 | End: 2024-08-20 | Stop reason: HOSPADM

## 2024-08-19 RX ORDER — KETAMINE HYDROCHLORIDE 10 MG/ML
INJECTION INTRAMUSCULAR; INTRAVENOUS PRN
Status: DISCONTINUED | OUTPATIENT
Start: 2024-08-19 | End: 2024-08-19

## 2024-08-19 RX ORDER — NALOXONE HYDROCHLORIDE 0.4 MG/ML
0.2 INJECTION, SOLUTION INTRAMUSCULAR; INTRAVENOUS; SUBCUTANEOUS
Status: DISCONTINUED | OUTPATIENT
Start: 2024-08-19 | End: 2024-08-20 | Stop reason: HOSPADM

## 2024-08-19 RX ORDER — POLYETHYLENE GLYCOL 3350 17 G/17G
17 POWDER, FOR SOLUTION ORAL DAILY
Status: DISCONTINUED | OUTPATIENT
Start: 2024-08-20 | End: 2024-08-20 | Stop reason: HOSPADM

## 2024-08-19 RX ORDER — NALOXONE HYDROCHLORIDE 0.4 MG/ML
0.4 INJECTION, SOLUTION INTRAMUSCULAR; INTRAVENOUS; SUBCUTANEOUS
Status: DISCONTINUED | OUTPATIENT
Start: 2024-08-19 | End: 2024-08-20 | Stop reason: HOSPADM

## 2024-08-19 RX ORDER — HYDROMORPHONE HCL IN WATER/PF 6 MG/30 ML
0.2 PATIENT CONTROLLED ANALGESIA SYRINGE INTRAVENOUS EVERY 5 MIN PRN
Status: DISCONTINUED | OUTPATIENT
Start: 2024-08-19 | End: 2024-08-19

## 2024-08-19 RX ORDER — KETOROLAC TROMETHAMINE 30 MG/ML
INJECTION, SOLUTION INTRAMUSCULAR; INTRAVENOUS PRN
Status: DISCONTINUED | OUTPATIENT
Start: 2024-08-19 | End: 2024-08-19

## 2024-08-19 RX ORDER — ONDANSETRON 2 MG/ML
4 INJECTION INTRAMUSCULAR; INTRAVENOUS EVERY 30 MIN PRN
Status: DISCONTINUED | OUTPATIENT
Start: 2024-08-19 | End: 2024-08-19

## 2024-08-19 RX ORDER — CEFAZOLIN SODIUM/WATER 2 G/20 ML
2 SYRINGE (ML) INTRAVENOUS
Status: COMPLETED | OUTPATIENT
Start: 2024-08-19 | End: 2024-08-19

## 2024-08-19 RX ORDER — PROPOFOL 10 MG/ML
INJECTION, EMULSION INTRAVENOUS PRN
Status: DISCONTINUED | OUTPATIENT
Start: 2024-08-19 | End: 2024-08-19

## 2024-08-19 RX ORDER — FENTANYL CITRATE 50 UG/ML
25 INJECTION, SOLUTION INTRAMUSCULAR; INTRAVENOUS EVERY 5 MIN PRN
Status: DISCONTINUED | OUTPATIENT
Start: 2024-08-19 | End: 2024-08-19

## 2024-08-19 RX ORDER — KETOROLAC TROMETHAMINE 30 MG/ML
15 INJECTION, SOLUTION INTRAMUSCULAR; INTRAVENOUS
Status: DISCONTINUED | OUTPATIENT
Start: 2024-08-19 | End: 2024-08-19

## 2024-08-19 RX ORDER — NALOXONE HYDROCHLORIDE 0.4 MG/ML
0.1 INJECTION, SOLUTION INTRAMUSCULAR; INTRAVENOUS; SUBCUTANEOUS
Status: DISCONTINUED | OUTPATIENT
Start: 2024-08-19 | End: 2024-08-19

## 2024-08-19 RX ORDER — FENTANYL CITRATE 50 UG/ML
INJECTION, SOLUTION INTRAMUSCULAR; INTRAVENOUS PRN
Status: DISCONTINUED | OUTPATIENT
Start: 2024-08-19 | End: 2024-08-19

## 2024-08-19 RX ORDER — SODIUM CHLORIDE, SODIUM GLUCONATE, SODIUM ACETATE, POTASSIUM CHLORIDE AND MAGNESIUM CHLORIDE 526; 502; 368; 37; 30 MG/100ML; MG/100ML; MG/100ML; MG/100ML; MG/100ML
INJECTION, SOLUTION INTRAVENOUS CONTINUOUS PRN
Status: DISCONTINUED | OUTPATIENT
Start: 2024-08-19 | End: 2024-08-19

## 2024-08-19 RX ORDER — HYDROMORPHONE HCL IN WATER/PF 6 MG/30 ML
0.4 PATIENT CONTROLLED ANALGESIA SYRINGE INTRAVENOUS
Status: DISCONTINUED | OUTPATIENT
Start: 2024-08-19 | End: 2024-08-20

## 2024-08-19 RX ORDER — NALOXONE HYDROCHLORIDE 0.4 MG/ML
0.1 INJECTION, SOLUTION INTRAMUSCULAR; INTRAVENOUS; SUBCUTANEOUS
Status: CANCELLED | OUTPATIENT
Start: 2024-08-19

## 2024-08-19 RX ORDER — ONDANSETRON 4 MG/1
4 TABLET, ORALLY DISINTEGRATING ORAL EVERY 30 MIN PRN
Status: DISCONTINUED | OUTPATIENT
Start: 2024-08-19 | End: 2024-08-19

## 2024-08-19 RX ORDER — OXYCODONE HYDROCHLORIDE 5 MG/1
5 TABLET ORAL EVERY 6 HOURS PRN
Qty: 30 TABLET | Refills: 0 | Status: SHIPPED | OUTPATIENT
Start: 2024-08-19 | End: 2024-08-27

## 2024-08-19 RX ORDER — OXYCODONE HYDROCHLORIDE 5 MG/1
5 TABLET ORAL
Status: CANCELLED | OUTPATIENT
Start: 2024-08-19

## 2024-08-19 RX ORDER — DEXAMETHASONE SODIUM PHOSPHATE 4 MG/ML
4 INJECTION, SOLUTION INTRA-ARTICULAR; INTRALESIONAL; INTRAMUSCULAR; INTRAVENOUS; SOFT TISSUE
Status: DISCONTINUED | OUTPATIENT
Start: 2024-08-19 | End: 2024-08-19

## 2024-08-19 RX ORDER — DEXAMETHASONE SODIUM PHOSPHATE 4 MG/ML
INJECTION, SOLUTION INTRA-ARTICULAR; INTRALESIONAL; INTRAMUSCULAR; INTRAVENOUS; SOFT TISSUE PRN
Status: DISCONTINUED | OUTPATIENT
Start: 2024-08-19 | End: 2024-08-19

## 2024-08-19 RX ORDER — DEXAMETHASONE SODIUM PHOSPHATE 4 MG/ML
4 INJECTION, SOLUTION INTRA-ARTICULAR; INTRALESIONAL; INTRAMUSCULAR; INTRAVENOUS; SOFT TISSUE
Status: CANCELLED | OUTPATIENT
Start: 2024-08-19

## 2024-08-19 RX ORDER — ONDANSETRON 2 MG/ML
INJECTION INTRAMUSCULAR; INTRAVENOUS PRN
Status: DISCONTINUED | OUTPATIENT
Start: 2024-08-19 | End: 2024-08-19

## 2024-08-19 RX ORDER — FENTANYL CITRATE 50 UG/ML
50 INJECTION, SOLUTION INTRAMUSCULAR; INTRAVENOUS EVERY 5 MIN PRN
Status: DISCONTINUED | OUTPATIENT
Start: 2024-08-19 | End: 2024-08-19

## 2024-08-19 RX ORDER — BISACODYL 10 MG
10 SUPPOSITORY, RECTAL RECTAL DAILY PRN
Status: DISCONTINUED | OUTPATIENT
Start: 2024-08-22 | End: 2024-08-20 | Stop reason: HOSPADM

## 2024-08-19 RX ORDER — HYDROMORPHONE HCL IN WATER/PF 6 MG/30 ML
0.4 PATIENT CONTROLLED ANALGESIA SYRINGE INTRAVENOUS EVERY 5 MIN PRN
Status: DISCONTINUED | OUTPATIENT
Start: 2024-08-19 | End: 2024-08-19

## 2024-08-19 RX ORDER — SODIUM CHLORIDE, SODIUM LACTATE, POTASSIUM CHLORIDE, CALCIUM CHLORIDE 600; 310; 30; 20 MG/100ML; MG/100ML; MG/100ML; MG/100ML
INJECTION, SOLUTION INTRAVENOUS CONTINUOUS
Status: DISCONTINUED | OUTPATIENT
Start: 2024-08-19 | End: 2024-08-19

## 2024-08-19 RX ORDER — ACETAMINOPHEN 325 MG/1
650 TABLET ORAL EVERY 4 HOURS PRN
Status: DISCONTINUED | OUTPATIENT
Start: 2024-08-22 | End: 2024-08-20 | Stop reason: HOSPADM

## 2024-08-19 RX ORDER — ACETAMINOPHEN 325 MG/1
975 TABLET ORAL EVERY 8 HOURS
Status: DISCONTINUED | OUTPATIENT
Start: 2024-08-19 | End: 2024-08-20 | Stop reason: HOSPADM

## 2024-08-19 RX ORDER — HYDROMORPHONE HCL IN WATER/PF 6 MG/30 ML
0.2 PATIENT CONTROLLED ANALGESIA SYRINGE INTRAVENOUS
Status: DISCONTINUED | OUTPATIENT
Start: 2024-08-19 | End: 2024-08-20

## 2024-08-19 RX ORDER — OXYCODONE HYDROCHLORIDE 5 MG/1
5 TABLET ORAL EVERY 4 HOURS PRN
Status: DISCONTINUED | OUTPATIENT
Start: 2024-08-19 | End: 2024-08-20 | Stop reason: HOSPADM

## 2024-08-19 RX ORDER — ONDANSETRON 2 MG/ML
4 INJECTION INTRAMUSCULAR; INTRAVENOUS EVERY 30 MIN PRN
Status: CANCELLED | OUTPATIENT
Start: 2024-08-19

## 2024-08-19 RX ORDER — ACETAMINOPHEN 325 MG/1
975 TABLET ORAL ONCE
Status: COMPLETED | OUTPATIENT
Start: 2024-08-19 | End: 2024-08-19

## 2024-08-19 RX ORDER — OXYCODONE HYDROCHLORIDE 10 MG/1
10 TABLET ORAL
Status: CANCELLED | OUTPATIENT
Start: 2024-08-19

## 2024-08-19 RX ORDER — LIDOCAINE HYDROCHLORIDE 20 MG/ML
INJECTION, SOLUTION INFILTRATION; PERINEURAL PRN
Status: DISCONTINUED | OUTPATIENT
Start: 2024-08-19 | End: 2024-08-19

## 2024-08-19 RX ORDER — ESMOLOL HYDROCHLORIDE 10 MG/ML
INJECTION INTRAVENOUS PRN
Status: DISCONTINUED | OUTPATIENT
Start: 2024-08-19 | End: 2024-08-19

## 2024-08-19 RX ORDER — BUPIVACAINE HYDROCHLORIDE AND EPINEPHRINE 2.5; 5 MG/ML; UG/ML
INJECTION, SOLUTION INFILTRATION; PERINEURAL PRN
Status: DISCONTINUED | OUTPATIENT
Start: 2024-08-19 | End: 2024-08-19 | Stop reason: HOSPADM

## 2024-08-19 RX ORDER — AMOXICILLIN 250 MG
1 CAPSULE ORAL 2 TIMES DAILY
Status: DISCONTINUED | OUTPATIENT
Start: 2024-08-19 | End: 2024-08-20 | Stop reason: HOSPADM

## 2024-08-19 RX ORDER — ONDANSETRON 2 MG/ML
4 INJECTION INTRAMUSCULAR; INTRAVENOUS EVERY 6 HOURS PRN
Status: DISCONTINUED | OUTPATIENT
Start: 2024-08-19 | End: 2024-08-20 | Stop reason: HOSPADM

## 2024-08-19 RX ORDER — CEFAZOLIN SODIUM/WATER 2 G/20 ML
2 SYRINGE (ML) INTRAVENOUS SEE ADMIN INSTRUCTIONS
Status: DISCONTINUED | OUTPATIENT
Start: 2024-08-19 | End: 2024-08-19 | Stop reason: HOSPADM

## 2024-08-19 RX ORDER — OXYCODONE HYDROCHLORIDE 10 MG/1
10 TABLET ORAL EVERY 4 HOURS PRN
Status: DISCONTINUED | OUTPATIENT
Start: 2024-08-19 | End: 2024-08-20 | Stop reason: HOSPADM

## 2024-08-19 RX ADMIN — MIDAZOLAM 2 MG: 1 INJECTION INTRAMUSCULAR; INTRAVENOUS at 10:54

## 2024-08-19 RX ADMIN — FENTANYL CITRATE 50 MCG: 50 INJECTION, SOLUTION INTRAMUSCULAR; INTRAVENOUS at 13:22

## 2024-08-19 RX ADMIN — FENTANYL CITRATE 100 MCG: 50 INJECTION INTRAMUSCULAR; INTRAVENOUS at 11:45

## 2024-08-19 RX ADMIN — PROPOFOL 120 MG: 10 INJECTION, EMULSION INTRAVENOUS at 11:04

## 2024-08-19 RX ADMIN — SODIUM CHLORIDE, SODIUM GLUCONATE, SODIUM ACETATE, POTASSIUM CHLORIDE AND MAGNESIUM CHLORIDE: 526; 502; 368; 37; 30 INJECTION, SOLUTION INTRAVENOUS at 10:57

## 2024-08-19 RX ADMIN — LIDOCAINE HYDROCHLORIDE 60 MG: 20 INJECTION, SOLUTION INFILTRATION; PERINEURAL at 11:04

## 2024-08-19 RX ADMIN — SUGAMMADEX 200 MG: 100 INJECTION, SOLUTION INTRAVENOUS at 12:52

## 2024-08-19 RX ADMIN — PROCHLORPERAZINE EDISYLATE 10 MG: 5 INJECTION INTRAMUSCULAR; INTRAVENOUS at 23:20

## 2024-08-19 RX ADMIN — ESMOLOL HYDROCHLORIDE 40 MG: 10 INJECTION, SOLUTION INTRAVENOUS at 11:22

## 2024-08-19 RX ADMIN — HYDROMORPHONE HYDROCHLORIDE 0.4 MG: 0.2 INJECTION, SOLUTION INTRAMUSCULAR; INTRAVENOUS; SUBCUTANEOUS at 13:53

## 2024-08-19 RX ADMIN — FENTANYL CITRATE 50 MCG: 50 INJECTION INTRAMUSCULAR; INTRAVENOUS at 12:22

## 2024-08-19 RX ADMIN — HYDROMORPHONE HYDROCHLORIDE 0.2 MG: 0.2 INJECTION, SOLUTION INTRAMUSCULAR; INTRAVENOUS; SUBCUTANEOUS at 16:23

## 2024-08-19 RX ADMIN — Medication 2 G: at 11:04

## 2024-08-19 RX ADMIN — SODIUM CHLORIDE, POTASSIUM CHLORIDE, SODIUM LACTATE AND CALCIUM CHLORIDE: 600; 310; 30; 20 INJECTION, SOLUTION INTRAVENOUS at 17:05

## 2024-08-19 RX ADMIN — SENNOSIDES AND DOCUSATE SODIUM 1 TABLET: 8.6; 5 TABLET ORAL at 20:13

## 2024-08-19 RX ADMIN — Medication 20 MG: at 11:04

## 2024-08-19 RX ADMIN — ONDANSETRON 4 MG: 2 INJECTION INTRAMUSCULAR; INTRAVENOUS at 20:28

## 2024-08-19 RX ADMIN — Medication 100 MG: at 11:05

## 2024-08-19 RX ADMIN — SUGAMMADEX 100 MG: 100 INJECTION, SOLUTION INTRAVENOUS at 12:59

## 2024-08-19 RX ADMIN — FENTANYL CITRATE 50 MCG: 50 INJECTION, SOLUTION INTRAMUSCULAR; INTRAVENOUS at 13:29

## 2024-08-19 RX ADMIN — PHENYLEPHRINE HYDROCHLORIDE 0.2 MCG/KG/MIN: 10 INJECTION INTRAVENOUS at 11:55

## 2024-08-19 RX ADMIN — FENTANYL CITRATE 50 MCG: 50 INJECTION, SOLUTION INTRAMUSCULAR; INTRAVENOUS at 13:45

## 2024-08-19 RX ADMIN — HYDROMORPHONE HYDROCHLORIDE 0.4 MG: 0.2 INJECTION, SOLUTION INTRAMUSCULAR; INTRAVENOUS; SUBCUTANEOUS at 21:43

## 2024-08-19 RX ADMIN — ACETAMINOPHEN 975 MG: 325 TABLET ORAL at 20:13

## 2024-08-19 RX ADMIN — HYDROMORPHONE HYDROCHLORIDE 0.2 MG: 0.2 INJECTION, SOLUTION INTRAMUSCULAR; INTRAVENOUS; SUBCUTANEOUS at 17:19

## 2024-08-19 RX ADMIN — KETOROLAC TROMETHAMINE 30 MG: 30 INJECTION, SOLUTION INTRAMUSCULAR at 12:33

## 2024-08-19 RX ADMIN — DEXAMETHASONE SODIUM PHOSPHATE 10 MG: 4 INJECTION, SOLUTION INTRA-ARTICULAR; INTRALESIONAL; INTRAMUSCULAR; INTRAVENOUS; SOFT TISSUE at 11:30

## 2024-08-19 RX ADMIN — ONDANSETRON 4 MG: 2 INJECTION INTRAMUSCULAR; INTRAVENOUS at 12:33

## 2024-08-19 RX ADMIN — ACETAMINOPHEN 975 MG: 325 TABLET ORAL at 09:46

## 2024-08-19 RX ADMIN — FENTANYL CITRATE 50 MCG: 50 INJECTION, SOLUTION INTRAMUSCULAR; INTRAVENOUS at 13:38

## 2024-08-19 RX ADMIN — HYDROMORPHONE HYDROCHLORIDE 0.2 MG: 0.2 INJECTION, SOLUTION INTRAMUSCULAR; INTRAVENOUS; SUBCUTANEOUS at 19:13

## 2024-08-19 ASSESSMENT — ACTIVITIES OF DAILY LIVING (ADL)
ADLS_ACUITY_SCORE: 20
ADLS_ACUITY_SCORE: 19
ADLS_ACUITY_SCORE: 20

## 2024-08-19 NOTE — ANESTHESIA CARE TRANSFER NOTE
Patient: Shayla Mcclure    Procedure: Procedure(s):  RIGHT THORACOSCOPIC RESECTION of pericardial cyst       Diagnosis: Pericardial cyst along right cardiophrenic angle [Q24.8]  Diagnosis Additional Information: No value filed.    Anesthesia Type:   General     Note:    Oropharynx: oropharynx clear of all foreign objects and spontaneously breathing  Level of Consciousness: drowsy  Oxygen Supplementation: face mask  Level of Supplemental Oxygen (L/min / FiO2): 5  Independent Airway: airway patency satisfactory and stable  Dentition: dentition unchanged  Vital Signs Stable: post-procedure vital signs reviewed and stable  Report to RN Given: handoff report given  Patient transferred to: PACU  Comments: VSS, report given to RN.    Handoff Report: Identifed the Patient, Identified the Reponsible Provider, Reviewed the pertinent medical history, Discussed the surgical course, Reviewed Intra-OP anesthesia mangement and issues during anesthesia, Set expectations for post-procedure period and Allowed opportunity for questions and acknowledgement of understanding      Vitals:  Vitals Value Taken Time   /71    Temp     Pulse 82 08/19/24 1308   Resp 14    SpO2 100 % 08/19/24 1308   Vitals shown include unfiled device data.    Electronically Signed By: CLAUDIA Delong CRNA  August 19, 2024  1:09 PM

## 2024-08-19 NOTE — PROGRESS NOTES
1400 Dr. Huerta's team removed the right chest tube with no issues  1600 Patient's HR went from 100's to 130's, paged Dr. Norma Crenshaw, chest xray ordered. Patient states it hurts when she coughs, when she takes a deep breath, and has some right upper chest pain. No crepitus noted, small amount of bloody drainage on the steri strips. The dressing from the CT has no drainage noted. Patient's  came by to see patient and bring belongings to [place].  Per patient her HR would go up to 140's prior to her scheduled surgery. Patient will be admitted overnight, the hospital MD team came to assess patient status while we wait for a bed.

## 2024-08-19 NOTE — ANESTHESIA POSTPROCEDURE EVALUATION
Patient: Shayla Mcclure    Procedure: Procedure(s):  RIGHT THORACOSCOPIC RESECTION of pericardial cyst       Anesthesia Type:  General    Note:  Disposition: Inpatient   Postop Pain Control: Uneventful            Sign Out: Well controlled pain (Patient endorses right shoulder pain and primary team is aware.)   PONV:    Neuro/Psych: Uneventful            Sign Out: Acceptable/Baseline neuro status   Airway/Respiratory: Uneventful            Sign Out: Acceptable/Baseline resp. status   CV/Hemodynamics: Uneventful            Sign Out: Detailed CV status (Patient has been tachy to 130's and priamry team aware, Dr. Huerta also aware.)               Blood Pressure: Normal               Rate/Rhythm: Tachycardia               Perfusion:  Adequate perfusion indices   Other NRE: NONE   DID A NON-ROUTINE EVENT OCCUR? No           Last vitals:  Vitals Value Taken Time   /68 08/19/24 1700   Temp 36.7  C (98  F) 08/19/24 1600   Pulse 121 08/19/24 1712   Resp 20 08/19/24 1712   SpO2 99 % 08/19/24 1712   Vitals shown include unfiled device data.    Electronically Signed By: Oanh Hill MD  August 19, 2024  5:12 PM

## 2024-08-19 NOTE — BRIEF OP NOTE
[facility]    Brief Operative Note    Pre-operative diagnosis: Pericardial cyst along right cardiophrenic angle [Q24.8]  Post-operative diagnosis Same as pre-operative diagnosis    Procedure: RIGHT THORACOSCOPIC RESECTION of pericardial cyst, Right - Chest    Surgeon: Surgeons and Role:     * Gideon Huerta MD - Primary     * Norma Crenshaw MD - Fellow - Assisting  Anesthesia: General   Estimated Blood Loss: Minimal    Drains: Charlie drain in right pleural space  Specimens:   ID Type Source Tests Collected by Time Destination   1 : pericardial cyst Tissue Pericardium SURGICAL PATHOLOGY EXAM Gideon Huerta MD 8/19/2024 12:23 PM      Findings:   Right pericardial cyst .  Complications: none .  Implants: * No implants in log *

## 2024-08-19 NOTE — ANESTHESIA PREPROCEDURE EVALUATION
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    History of severe pre-eclampsia     delivered at 27w3d    Leiomyoma     prior laparotomy for two large fibroids       Past Surgical History:   Procedure Laterality Date    C/SECTION, LOW TRANSVERSE      delivered at 27w3d for pre-eclampsia with severe features     COSMETIC MAMMOPLASTY AUGMENTATION BILATERAL      when 16 years old    DILATION AND CURETTAGE SUCTION N/A 2020    Procedure: DILATION AND CURETTAGE, UTERUS, USING SUCTION;  Surgeon: Haydee Mcpherson DO;  Location: [facility] OR    DILATION AND CURETTAGE SUCTION  2021    repeat procedure for retained POC after prior suction D&C 2021    DILATION AND CURETTAGE SUCTION N/A 2021    Procedure: DILATION AND CURETTAGE, UTERUS, USING SUCTION;  Surgeon: Haydee Mcpherson DO;  Location: [facility] OR    MYOMECTOMY UTERUS  2012    open myomectomy in [place], reported very large fibroids      Allergies   Allergen Reactions    Clindamycin       Social History     Tobacco Use    Smoking status: Never     Passive exposure: Never    Smokeless tobacco: Never   Substance Use Topics    Alcohol use: Yes     Comment: only birthdays and holidays      Wt Readings from Last 1 Encounters:   24 65.6 kg (144 lb 10 oz)        Anesthesia Evaluation   Pt has had prior anesthetic. Type: General.    No history of anesthetic complications       ROS/MED HX  ENT/Pulmonary:  - neg pulmonary ROS     Neurologic:  - neg neurologic ROS     Cardiovascular: Comment: Pericardial cyst with intermittent arrhythmias      METS/Exercise Tolerance:     Hematologic:  - neg hematologic  ROS     Musculoskeletal:       GI/Hepatic:     (+)             liver disease,       Renal/Genitourinary:  - neg Renal ROS     Endo:   - neg endo ROS     Psychiatric/Substance Use:  - neg psychiatric ROS     Infectious Disease:  - neg infectious disease ROS     Malignancy:       Other:            Physical Exam    Airway  airway exam normal           Respiratory Devices and Support         Dental       (+) Modest Abnormalities - crowns, retainers, 1 or 2 missing teeth      Cardiovascular   cardiovascular exam normal          Pulmonary   pulmonary exam normal                OUTSIDE LABS:  CBC:   Lab Results   Component Value Date    WBC 9.0 07/30/2024    WBC 11.5 (H) 07/17/2024    HGB 13.5 07/30/2024    HGB 13.8 07/17/2024    HCT 39.4 07/30/2024    HCT 42.0 07/17/2024     07/30/2024     07/17/2024     BMP:   Lab Results   Component Value Date     07/30/2024     07/17/2024    POTASSIUM 3.9 07/30/2024    POTASSIUM 4.0 07/19/2024    CHLORIDE 103 07/30/2024    CHLORIDE 103 07/17/2024    CO2 24 07/30/2024    CO2 24 07/17/2024    BUN 12.5 07/30/2024    BUN 19.5 07/17/2024    CR 0.53 07/30/2024    CR 0.56 07/17/2024    GLC 92 08/19/2024    GLC 92 07/30/2024     COAGS:   Lab Results   Component Value Date    PTT 27 03/29/2020    INR 0.90 09/23/2021     POC:   Lab Results   Component Value Date    HCG Positive (A) 03/09/2020    HCGS Negative 12/31/2019     HEPATIC:   Lab Results   Component Value Date    ALBUMIN 4.1 07/17/2024    PROTTOTAL 6.8 07/17/2024    ALT 36 07/17/2024    AST 17 07/17/2024    ALKPHOS 63 07/17/2024    BILITOTAL <0.2 07/17/2024     OTHER:   Lab Results   Component Value Date    KASSANDRA 9.2 07/30/2024    MAG 3.6 (H) 07/18/2024    TSH 1.39 11/15/2023    T3 95 11/22/2021    CRP <2.9 09/23/2021       Anesthesia Plan    ASA Status:  2    NPO Status:  NPO Appropriate    Anesthesia Type: General.     - Airway: ETT   Induction: Intravenous.   Maintenance: TIVA.   Techniques and Equipment:     - Airway: Double lumen ETT     - Lines/Monitors: 2nd IV     Consents    Anesthesia Plan(s) and associated risks, benefits, and  realistic alternatives discussed. Questions answered and patient/representative(s) expressed understanding.     - Discussed: Risks, Benefits and Alternatives for BOTH SEDATION and the PROCEDURE were discussed     - Discussed with:  Patient            Postoperative Care    Pain management: Oral pain medications, Multi-modal analgesia.   PONV prophylaxis: Ondansetron (or other 5HT-3), Dexamethasone or Solumedrol     Comments:               Oleg Alvarez MD, MD    I have reviewed the pertinent notes and labs in the chart from the past 30 days and (re)examined the patient.  Any updates or changes from those notes are reflected in this note.              # Overweight: Estimated body mass index is 28.24 kg/m  as calculated from the following:    Height as of this encounter: 1.524 m (5').    Weight as of this encounter: 65.6 kg (144 lb 10 oz).

## 2024-08-19 NOTE — OP NOTE
Preoperative diagnosis:  RIGHT pericardial cyst  Postoperative diagnosis: The same  Procedure:   RIGHT thoracoscopic resection of pericardial cyst    Anesthesia: General  Surgeon: Gideon Huerta (present and participated in the entire procedure)  Resident surgeon: Kip Lechuga  EBL: 20 ml  Complications: None  Findings:  Thoracoscopy: straightforward, moderate-sized pericardial cyst, removed without major difficulty  Patient tolerance: Procedure tolerated well and without complication      Description of procedure    We secured Shayla GIFFORD Meigs in the LEFT lateral position and the RIGHT chest was prepared and draped.   First, we made a 12 mm incision in the 6th intercostal space anteriorly, and explored the chest cavity with insufflation. We determined that it would probably be quite straightforward to do this procedure with a single working port. Next, we extended the port incision into a 5 cm access incision and placed a wound protector. We identified the cyst and easily peeled it off the pericardium, transecting some of the pericardial fat pad. We removed the cyst and sent it for pathology.    We verified that hemostasis was adequate, we infiltrated the intercostal spaces with local anesthetic, then then placed a 19 [place]onese chest tube, verified that the lung insufflated well, and closed with absorbable sutures.

## 2024-08-19 NOTE — PROGRESS NOTES
Thoracic Surgery Progress Note  Surgery Cross-Cover  Post Op Check    08/19/2024    Shayla Mcclure is a 39 year old female POD#0 s/p Procedure(s):  RIGHT THORACOSCOPIC RESECTION of pericardial cyst for Pre-Op Diagnosis Codes:     * Pericardial cyst along right cardiophrenic angle [Q24.8]    Pt reports their pain is controlled with current regimen. Denies nausea, SOB, chest pain, or dizziness. States she has lightheadedness since getting out of the OR. Patient is not passing flatus or having bowel movements and has not yet voided.     /68   Pulse (!) 121   Temp 98  F (36.7  C) (Oral)   Resp 20   Ht 1.524 m (5')   Wt 65.6 kg (144 lb 10 oz)   LMP 08/13/2024 (Exact Date)   SpO2 99%   BMI 28.24 kg/m      Gen: A&O x4, NAD   Chest: breathing non-labored on RA   Abdomen: soft, non-tender, non-distended  Extremities: warm and well perfused  Devices: CT removed in PACU    A/P: Was tachycardic to 120s-130s in PACU but asymptomatic other than lightheadedness. States she had experienced arrythmias and tachycardia before prior to the procedure that was likely 2/2 to mass effect. Discussed reaching out if chest pain worsens, SOB occurs, etc..  Please call with any questions.    Omid Mason MD

## 2024-08-19 NOTE — ANESTHESIA PROCEDURE NOTES
Airway       Patient location during procedure: OR       Procedure Start/Stop Times: 8/19/2024 11:10 AM  Staff -        Anesthesiologist:  Oleg Alvarez MD       Performed By: anesthesiologist  Consent for Airway        Urgency: elective  Indications and Patient Condition       Indications for airway management: lynn-procedural       Induction type:intravenous       Mask difficulty assessment: 1 - vent by mask    Final Airway Details       Final airway type: endotracheal airway       Successful airway: ETT - double lumen left  Endotracheal Airway Details        Cuffed: yes       Successful intubation technique: video laryngoscopy and flexible bronchoscopy       VL Blade Size: MAC 3       Grade View of Cords: 2       Adjucts: stylet       Position: Right       Measured from: lips       Secured at (cm): 29       Bite block used: None       ETT Double lumen (fr): 37    Post intubation assessment        Placement verified by: capnometry, equal breath sounds and chest rise        Number of attempts at approach: 1       Secured with: silk tape       Ease of procedure: easy       Dentition: Unchanged    Medication(s) Administered   Medication Administration Time: 8/19/2024 11:10 AM    Additional Comments       Intubated by medical student, Ulisses and MD Kim.  Patient was difficult to get double lumen tube through cords even with a large bend.

## 2024-08-20 ENCOUNTER — APPOINTMENT (OUTPATIENT)
Dept: GENERAL RADIOLOGY | Facility: CLINIC | Age: 39
DRG: 272 | End: 2024-08-20
Payer: COMMERCIAL

## 2024-08-20 ENCOUNTER — APPOINTMENT (OUTPATIENT)
Dept: PHYSICAL THERAPY | Facility: CLINIC | Age: 39
DRG: 272 | End: 2024-08-20
Attending: SURGERY
Payer: COMMERCIAL

## 2024-08-20 ENCOUNTER — APPOINTMENT (OUTPATIENT)
Dept: OCCUPATIONAL THERAPY | Facility: CLINIC | Age: 39
DRG: 272 | End: 2024-08-20
Attending: SURGERY
Payer: COMMERCIAL

## 2024-08-20 VITALS
BODY MASS INDEX: 28.03 KG/M2 | DIASTOLIC BLOOD PRESSURE: 57 MMHG | SYSTOLIC BLOOD PRESSURE: 104 MMHG | RESPIRATION RATE: 18 BRPM | HEIGHT: 60 IN | HEART RATE: 101 BPM | TEMPERATURE: 98.7 F | OXYGEN SATURATION: 97 % | WEIGHT: 142.8 LBS

## 2024-08-20 LAB
ANION GAP SERPL CALCULATED.3IONS-SCNC: 10 MMOL/L (ref 7–15)
BUN SERPL-MCNC: 10 MG/DL (ref 6–20)
CALCIUM SERPL-MCNC: 7.8 MG/DL (ref 8.8–10.4)
CHLORIDE SERPL-SCNC: 107 MMOL/L (ref 98–107)
CREAT SERPL-MCNC: 0.56 MG/DL (ref 0.51–0.95)
EGFRCR SERPLBLD CKD-EPI 2021: >90 ML/MIN/1.73M2
ERYTHROCYTE [DISTWIDTH] IN BLOOD BY AUTOMATED COUNT: 13 % (ref 10–15)
GLUCOSE SERPL-MCNC: 151 MG/DL (ref 70–99)
HCO3 SERPL-SCNC: 21 MMOL/L (ref 22–29)
HCT VFR BLD AUTO: 37 % (ref 35–47)
HGB BLD-MCNC: 12.3 G/DL (ref 11.7–15.7)
MCH RBC QN AUTO: 29.1 PG (ref 26.5–33)
MCHC RBC AUTO-ENTMCNC: 33.2 G/DL (ref 31.5–36.5)
MCV RBC AUTO: 88 FL (ref 78–100)
PLATELET # BLD AUTO: 346 10E3/UL (ref 150–450)
POTASSIUM SERPL-SCNC: 3.9 MMOL/L (ref 3.4–5.3)
RBC # BLD AUTO: 4.23 10E6/UL (ref 3.8–5.2)
SODIUM SERPL-SCNC: 138 MMOL/L (ref 135–145)
WBC # BLD AUTO: 9.6 10E3/UL (ref 4–11)

## 2024-08-20 PROCEDURE — 36415 COLL VENOUS BLD VENIPUNCTURE: CPT

## 2024-08-20 PROCEDURE — 97110 THERAPEUTIC EXERCISES: CPT | Mod: GO

## 2024-08-20 PROCEDURE — 97530 THERAPEUTIC ACTIVITIES: CPT | Mod: GP

## 2024-08-20 PROCEDURE — 80048 BASIC METABOLIC PNL TOTAL CA: CPT

## 2024-08-20 PROCEDURE — 97535 SELF CARE MNGMENT TRAINING: CPT | Mod: GO

## 2024-08-20 PROCEDURE — 250N000013 HC RX MED GY IP 250 OP 250 PS 637

## 2024-08-20 PROCEDURE — 97116 GAIT TRAINING THERAPY: CPT | Mod: GP

## 2024-08-20 PROCEDURE — 71045 X-RAY EXAM CHEST 1 VIEW: CPT | Mod: 26 | Performed by: RADIOLOGY

## 2024-08-20 PROCEDURE — 97165 OT EVAL LOW COMPLEX 30 MIN: CPT | Mod: GO

## 2024-08-20 PROCEDURE — 85027 COMPLETE CBC AUTOMATED: CPT

## 2024-08-20 PROCEDURE — 250N000013 HC RX MED GY IP 250 OP 250 PS 637: Performed by: SURGERY

## 2024-08-20 PROCEDURE — 97161 PT EVAL LOW COMPLEX 20 MIN: CPT | Mod: GP

## 2024-08-20 PROCEDURE — 71045 X-RAY EXAM CHEST 1 VIEW: CPT

## 2024-08-20 PROCEDURE — 250N000011 HC RX IP 250 OP 636: Performed by: SURGERY

## 2024-08-20 RX ORDER — ACETAMINOPHEN 325 MG/1
650 TABLET ORAL EVERY 4 HOURS PRN
COMMUNITY
Start: 2024-08-22 | End: 2024-08-20

## 2024-08-20 RX ORDER — HYDROMORPHONE HCL IN WATER/PF 6 MG/30 ML
0.2 PATIENT CONTROLLED ANALGESIA SYRINGE INTRAVENOUS EVERY 4 HOURS PRN
Status: DISCONTINUED | OUTPATIENT
Start: 2024-08-20 | End: 2024-08-20

## 2024-08-20 RX ORDER — IBUPROFEN 800 MG/1
800 TABLET, FILM COATED ORAL 4 TIMES DAILY
COMMUNITY
Start: 2024-08-20 | End: 2024-08-20

## 2024-08-20 RX ORDER — IBUPROFEN 600 MG/1
600 TABLET, FILM COATED ORAL 4 TIMES DAILY
COMMUNITY
Start: 2024-08-20 | End: 2024-08-20

## 2024-08-20 RX ORDER — IBUPROFEN 800 MG/1
800 TABLET, FILM COATED ORAL 4 TIMES DAILY PRN
Qty: 56 TABLET | Refills: 0 | Status: SHIPPED | OUTPATIENT
Start: 2024-08-20 | End: 2024-09-26

## 2024-08-20 RX ORDER — METHOCARBAMOL 500 MG/1
500 TABLET, FILM COATED ORAL 4 TIMES DAILY
Status: DISCONTINUED | OUTPATIENT
Start: 2024-08-20 | End: 2024-08-20

## 2024-08-20 RX ORDER — METHOCARBAMOL 750 MG/1
750 TABLET, FILM COATED ORAL 4 TIMES DAILY
Status: DISCONTINUED | OUTPATIENT
Start: 2024-08-20 | End: 2024-08-20

## 2024-08-20 RX ORDER — AMOXICILLIN 250 MG
1 CAPSULE ORAL 2 TIMES DAILY
Qty: 14 TABLET | Refills: 0 | Status: SHIPPED | OUTPATIENT
Start: 2024-08-20 | End: 2024-09-26

## 2024-08-20 RX ORDER — ACETAMINOPHEN 325 MG/1
975 TABLET ORAL EVERY 8 HOURS
Qty: 120 TABLET | Refills: 0 | Status: SHIPPED | OUTPATIENT
Start: 2024-08-20

## 2024-08-20 RX ORDER — IBUPROFEN 600 MG/1
600 TABLET, FILM COATED ORAL 4 TIMES DAILY
Status: DISCONTINUED | OUTPATIENT
Start: 2024-08-20 | End: 2024-08-20

## 2024-08-20 RX ORDER — POLYETHYLENE GLYCOL 3350 17 G/17G
17 POWDER, FOR SOLUTION ORAL DAILY
Qty: 7 PACKET | Refills: 0 | Status: SHIPPED | OUTPATIENT
Start: 2024-08-20 | End: 2024-09-26

## 2024-08-20 RX ORDER — METHOCARBAMOL 750 MG/1
750 TABLET, FILM COATED ORAL 4 TIMES DAILY
Qty: 56 TABLET | Refills: 0 | Status: SHIPPED | OUTPATIENT
Start: 2024-08-20 | End: 2024-09-09

## 2024-08-20 RX ORDER — HYDROMORPHONE HCL IN WATER/PF 6 MG/30 ML
0.4 PATIENT CONTROLLED ANALGESIA SYRINGE INTRAVENOUS EVERY 4 HOURS PRN
Status: DISCONTINUED | OUTPATIENT
Start: 2024-08-20 | End: 2024-08-20

## 2024-08-20 RX ORDER — METHOCARBAMOL 750 MG/1
750 TABLET, FILM COATED ORAL 4 TIMES DAILY
Status: DISCONTINUED | OUTPATIENT
Start: 2024-08-20 | End: 2024-08-20 | Stop reason: HOSPADM

## 2024-08-20 RX ORDER — METHOCARBAMOL 750 MG/1
750 TABLET, FILM COATED ORAL 4 TIMES DAILY
Qty: 56 TABLET | Refills: 0 | Status: SHIPPED | OUTPATIENT
Start: 2024-08-20 | End: 2024-09-26

## 2024-08-20 RX ADMIN — OXYCODONE HYDROCHLORIDE 5 MG: 5 TABLET ORAL at 02:10

## 2024-08-20 RX ADMIN — METHOCARBAMOL 750 MG: 750 TABLET ORAL at 16:45

## 2024-08-20 RX ADMIN — METHOCARBAMOL 500 MG: 500 TABLET ORAL at 12:44

## 2024-08-20 RX ADMIN — IBUPROFEN 600 MG: 600 TABLET, FILM COATED ORAL at 12:44

## 2024-08-20 RX ADMIN — SENNOSIDES AND DOCUSATE SODIUM 1 TABLET: 8.6; 5 TABLET ORAL at 20:27

## 2024-08-20 RX ADMIN — ENOXAPARIN SODIUM 40 MG: 40 INJECTION SUBCUTANEOUS at 08:56

## 2024-08-20 RX ADMIN — METHOCARBAMOL 750 MG: 750 TABLET ORAL at 20:27

## 2024-08-20 RX ADMIN — POLYETHYLENE GLYCOL 3350 17 G: 17 POWDER, FOR SOLUTION ORAL at 08:55

## 2024-08-20 RX ADMIN — ACETAMINOPHEN 975 MG: 325 TABLET ORAL at 02:10

## 2024-08-20 RX ADMIN — OXYCODONE HYDROCHLORIDE 10 MG: 10 TABLET ORAL at 10:48

## 2024-08-20 RX ADMIN — ACETAMINOPHEN 975 MG: 325 TABLET ORAL at 09:01

## 2024-08-20 RX ADMIN — SENNOSIDES AND DOCUSATE SODIUM 1 TABLET: 8.6; 5 TABLET ORAL at 08:55

## 2024-08-20 RX ADMIN — ACETAMINOPHEN 975 MG: 325 TABLET ORAL at 18:56

## 2024-08-20 RX ADMIN — OXYCODONE HYDROCHLORIDE 10 MG: 10 TABLET ORAL at 15:07

## 2024-08-20 RX ADMIN — IBUPROFEN 800 MG: 200 TABLET, FILM COATED ORAL at 20:27

## 2024-08-20 RX ADMIN — IBUPROFEN 800 MG: 200 TABLET, FILM COATED ORAL at 16:45

## 2024-08-20 RX ADMIN — OXYCODONE HYDROCHLORIDE 10 MG: 10 TABLET ORAL at 20:27

## 2024-08-20 RX ADMIN — OXYCODONE HYDROCHLORIDE 10 MG: 10 TABLET ORAL at 06:42

## 2024-08-20 ASSESSMENT — ACTIVITIES OF DAILY LIVING (ADL)
ADLS_ACUITY_SCORE: 26
ADLS_ACUITY_SCORE: 34
ADLS_ACUITY_SCORE: 33
ADLS_ACUITY_SCORE: 34
ADLS_ACUITY_SCORE: 26
ADLS_ACUITY_SCORE: 34
ADLS_ACUITY_SCORE: 26
ADLS_ACUITY_SCORE: 34
IADL_COMMENTS: PT REPORTS IND WITH IADLS AT BASELINE.
ADLS_ACUITY_SCORE: 26
ADLS_ACUITY_SCORE: 34
ADLS_ACUITY_SCORE: 34
ADLS_ACUITY_SCORE: 26
ADLS_ACUITY_SCORE: 34
ADLS_ACUITY_SCORE: 26
ADLS_ACUITY_SCORE: 26
ADLS_ACUITY_SCORE: 34
PREVIOUS_RESPONSIBILITIES: MEAL PREP;HOUSEKEEPING;LAUNDRY;SHOPPING;YARDWORK;MEDICATION MANAGEMENT;FINANCES;DRIVING;CHILD CARE;WORK
ADLS_ACUITY_SCORE: 26
ADLS_ACUITY_SCORE: 26

## 2024-08-20 NOTE — PLAN OF CARE
Goal Outcome Evaluation:      Plan of Care Reviewed With: patient    Overall Patient Progress: no changeOverall Patient Progress: no change     BP 93/56 (BP Location: Right arm)   Pulse 85   Temp 98.1  F (36.7  C) (Oral)   Resp 20   Ht 1.524 m (5')   Wt 64.8 kg (142 lb 12.8 oz)   LMP 08/13/2024 (Exact Date)   SpO2 99%   BMI 27.89 kg/m      Patient is stable, admitted with Pericardial Cyst, has CXR this morning, pain and discomfort controlled, assistance provided with ADL cares as needed. CT and incision sites monitored. Patient is pending discharge, waiting for Oxycodone script, provider paged; has order walker to assist with ambulation at home.

## 2024-08-20 NOTE — PROGRESS NOTES
"   Occupational Therapy Evaluation: 08/20/24 2475   Appointment Info   Signing Clinician's Name / Credentials (OT) JUSTIN Patten, OTR/L   Rehab Comments (OT) RONY fuentes   Living Environment   People in Home child(ander), dependent;spouse;other relative(s)  (Pt cousin staying with them for next few weeks.)   Current Living Arrangements house   Home Accessibility stairs to enter home;stairs within home   Number of Stairs, Main Entrance 3   Stair Railings, Main Entrance none   Number of Stairs, Within Home, Primary greater than 10 stairs   Stair Railings, Within Home, Primary railing on right side (ascending)   Transportation Anticipated family or friend will provide   Living Environment Comments Pt reports living in a 2 story home with spouse and 2 dependent children (ages 15 months and 9 y.o). Main bedroom and bathroom (walk in shower) on 2nd floor. Laundry also located on 2nd floor.   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   General Information   Onset of Illness/Injury or Date of Surgery 08/19/24   Referring Physician Norma Crenshaw MD   Patient/Family Therapy Goal Statement (OT) Return home ASAP   Additional Occupational Profile Info/Pertinent History of Current Problem \"Shayla GIFFORD " "Ren is a 39 year old female POD#0 s/p Procedure(s):  RIGHT THORACOSCOPIC RESECTION of pericardial cyst for Pre-Op Diagnosis Codes:     * Pericardial cyst along right cardiophrenic angle (Q24.8).\"   Existing Precautions/Restrictions thoracotomy  (R UE)   Left Upper Extremity (Weight-bearing Status) full weight-bearing (FWB)   Right Upper Extremity (Weight-bearing Status) partial weight-bearing (PWB)  (<20lb x2 weeks (pt states per surgeon))   Left Lower Extremity (Weight-bearing Status) full weight-bearing (FWB)   Right Lower Extremity (Weight-bearing Status) full weight-bearing (FWB)   General Observations and Info Activity: ambulate with assist   Cognitive Status Examination   Orientation Status orientation to person, place and time   Visual Perception   Visual Impairment/Limitations WFL   Sensory   Sensory Comments Pt reports some intermittent numbness in legs and R arm, likely due to procedure.   Pain Assessment   Patient Currently in Pain Yes, see Vital Sign flowsheet   Posture   Posture forward head position;protracted shoulders   Range of Motion Comprehensive   Comment, General Range of Motion R ROM limited due to pain, anticipate when engagement in ex and pain control, would be WFL. LUE WFL.   Strength Comprehensive (MMT)   Comment, General Manual Muscle Testing (MMT) Assessment <20lb x2 weeks restriction RUE   Coordination   Upper Extremity Coordination No deficits were identified   Bed Mobility   Bed Mobility supine-sit   Comment (Bed Mobility) SBA, Cues to breath   Transfers   Transfers toilet transfer   Transfer Comments CGA per clinical judgement   Activities of Daily Living   BADL Assessment/Intervention lower body dressing;upper body dressing;grooming;toileting;bathing   Bathing Assessment/Intervention   [place] Level (Bathing) minimum assist (75% patient effort)   Comment, (Bathing) Per clinical judgement   Upper Body Dressing Assessment/Training   Comment, (Upper Body Dressing) Per clinical " judgement   [place] Level (Upper Body Dressing) minimum assist (75% patient effort)   Lower Body Dressing Assessment/Training   Comment, (Lower Body Dressing) Per clinical judgement   [place] Level (Lower Body Dressing) minimum assist (75% patient effort)   Grooming Assessment/Training   [place] Level (Grooming) contact guard assist   Comment, (Grooming) Per clinical judgement   Toileting   Comment, (Toileting) Per clinical judgement   [place] Level (Toileting) contact guard assist   Clinical Impression   Criteria for Skilled Therapeutic Interventions Met (OT) Yes, treatment indicated   OT Diagnosis Decreased ind with ADL/IADLs within R thora prec.   OT Problem List-Impairments impacting ADL problems related to;activity tolerance impaired;pain;post-surgical precautions;mobility;range of motion (ROM)   Assessment of Occupational Performance 3-5 Performance Deficits   Identified Performance Deficits Bathing, dressing, g/h, toileting, decreased RUE ROM   Planned Therapy Interventions (OT) ADL retraining;IADL retraining;bed mobility training;ROM;progressive activity/exercise;home program guidelines;risk factor education;strengthening;stretching   Clinical Decision Making Complexity (OT) problem focused assessment/low complexity   Risk & Benefits of therapy have been explained evaluation/treatment results reviewed;care plan/treatment goals reviewed;risks/benefits reviewed;current/potential barriers reviewed;participants voiced agreement with care plan;participants included;patient   Clinical Impression Comments Pt would benefit from skilled IP OT services to increase ind and safety with ADL/IADLs and RUE ROM prior to discharge home.   OT Total Evaluation Time   OT Eval, Low Complexity Minutes (51035) 5   OT Goals   Therapy Frequency (OT) Daily   OT Predicted Duration/Target Date for Goal Attainment 08/27/24   OT Goals Hygiene/Grooming;Upper Body Dressing;Lower Body Dressing;Upper Body Bathing;Lower  Body Bathing;Toilet Transfer/Toileting;Meal Preparation;Home Management;OT Goal 1   OT: Hygiene/Grooming modified independent   OT: Upper Body Dressing Modified independent   OT: Lower Body Dressing Modified independent   OT: Upper Body Bathing Modified independent   OT: Lower Body Bathing Modified independent   OT: Toilet Transfer/Toileting Modified independent   OT: Meal Preparation Modified independent   OT: Home Management Modified independent   OT: Goal 1 Pt will ind complete/verbalize understanding of R Thora Ex/stretches prior to returning home.   OT Discharge Planning   OT Plan Review R thora prec, talk through showering/need for any assist with showering, UB dressing   OT Discharge Recommendation (DC Rec) home with assist   OT Rationale for DC Rec Anticipate when pt medically stable and pain managed, would be safe to discharge home with assist from spouse/cousin for heavier lifting and  for next 2 weeks. Pt mobilizing with SBA and 2ww this date, limited RUE ROM due to pain frm procedure.   OT Brief overview of current status SBA with 2ww

## 2024-08-20 NOTE — PROGRESS NOTES
THORACIC SURGERY PROGRESS NOTE     S: NAGAON      O:  Vitals:    08/20/24 0901 08/20/24 1000 08/20/24 1048 08/20/24 1525   BP:    104/57   BP Location:    Right arm   Cuff Size:       Pulse:    101   Resp:    18   Temp:    98.7  F (37.1  C)   TempSrc:    Oral   SpO2: 100%  99% 97%   Weight:  64.8 kg (142 lb 12.8 oz)     Height:             Gen: A&O x4, NAD   Chest: breathing non-labored on RA   Abdomen: soft, non-tender, non-distended  Extremities: warm and well perfused  Devices: CT removed in PACU    Most recent labs and Imaging Reviewed 8/20     A/P: 39 year old female POD#1 s/p RIGHT THORACOSCOPIC RESECTION of pericardial cyst.     - Better control of pain by increasing adjunct pain medication (robaxin, ibuprofen, tylenol)   Disposition: discharge tomorrow     Seen with thoracic fellow, will discuss with staff.    Omid Mason MD (PGY-1)  General Surgery Resident  Thoracic Surgery

## 2024-08-20 NOTE — PROGRESS NOTES
08/20/24 1000   Appointment Info   Signing Clinician's Name / Credentials (PT) DEMAR Foley   Student Supervision Direct Patient Contact Provided;Therapy services provided with the co-signing licensed therapist guiding and directing the services, and providing the skilled judgement and assessment throughout the session   Rehab Comments (PT) Pain due to pericardial resection   Living Environment   People in Home child(ander), dependent;spouse;sibling(s)   Current Living Arrangements house   Home Accessibility stairs to enter home;stairs within home   Number of Stairs, Main Entrance 3   Stair Railings, Main Entrance none   Stair Railings, Within Home, Primary railing on right side (ascending)   Transportation Anticipated family or friend will provide   Living Environment Comments Pt lives in a 2 story house with her 15 mo, 8y/o daughter w/ disabilities,  and cousin. House has 3 STEs and no rails at entrance; main area, kitchen, bathroom are located on the 1st floor and bedroom on the 2nd. Rails on the R side inside the house. Pt reported  is able to provide support with transportation after discharge, and is able to help with daughter care.   Self-Care   Usual Activity Tolerance good   Current Activity Tolerance fair   Regular Exercise Yes   Activity/Exercise Type play  (Pt reports taking care of her daughter while doing some exercises with her before admission.)   Equipment Currently Used at Home none   Fall history within last six months no   Activity/Exercise/Self-Care Comment Pt reported frequent exercise with her daughter before admission and demonstrated fair level of activity tolerance due to pain at incision site. Pt reported having WC and walker at home but owned by her daughter and were unsuitable to her height. Pt reported no hx of falling in the past 6 months.   General Information   Onset of Illness/Injury or Date of Surgery 08/19/24   Referring Physician Norma Crenshaw MD    Patient/Family Therapy Goals Statement (PT) Going back home and being able to do things that can take care of her daughter.   Pertinent History of Current Problem (include personal factors and/or comorbidities that impact the POC) Pt is a 39 year old female POD#0 s/p Procedure(s):  RIGHT THORACOSCOPIC RESECTION of pericardial cyst for Pre-Op Diagnosis Codes:     * Pericardial cyst along right cardiophrenic angle (Q24.8)   Existing Precautions/Restrictions thoracotomy   Weight-Bearing Status - RUE partial weight-bearing (% in comments)   General Observations Activity Ambulate with assist, Up in chair   Cognition   Affect/Mental Status (Cognition) WNL   Orientation Status (Cognition) oriented x 4   Follows Commands (Cognition) WNL   Pain Assessment   Patient Currently in Pain Yes, see Vital Sign flowsheet   Integumentary/Edema   Integumentary/Edema no deficits were identifed   Posture    Posture Forward head position;Protracted shoulders   Posture Comments Pt demonstrated forward head position and protracted shoulders during sitting and ambulation with FWW due to pain at R thoracic incision site.   Range of Motion (ROM)   Range of Motion ROM is WFL   Strength (Manual Muscle Testing)   Strength (Manual Muscle Testing) strength is WFL   Bed Mobility   Bed Mobility rolling left   Rolling Left [place] (Bed Mobility) 1 person assist;minimum assist (75% patient effort)  (Indepedence was limited due to pain at incision site.)   Bed Mobility Limitations impaired ability to control trunk for mobility   Impairments Contributing to Impaired Bed Mobility pain   Assistive Device (Bed Mobility) bed rails   Comment, (Bed Mobility) Pt was able to use the L bed rail to grab and pull in order to roll towards L with Shellie x1. Movement was limited due to excessive pain at R thoracic incision site.   Transfers   Transfers sit-stand transfer   Maintains Weight-bearing Status (Transfers) able to maintain   Transfer Safety Concerns  Noted decreased step length  (Limitation was induced by pain at incision site.)   Impairments Contributing to Impaired Transfers pain   Comment, (Transfers) Pt was able to complete STS transfer with FWW and CGA and demonstrated decreased step length due to incision site pain and had to take short breaks in betwen transfers.   Sit-Stand Transfer   Sit-Stand Echo (Transfers) contact guard;1 person assist   Assistive Device (Sit-Stand Transfers) walker, front-wheeled   Comment, (Sit-Stand Transfer) Pt was able to complete STS transfer with FWW and CGA and demonstrated decreased step length due to incision site pain and had to take short breaks in betwen transfers.   Gait/Stairs (Locomotion)   Echo Level (Gait) minimum assist (75% patient effort);1 person assist   Assistive Device (Gait) walker, front-wheeled   Deviations/Abnormal Patterns (Gait) jacob decreased;stride length decreased  (limitation induced by pain)   Maintains Weight-bearing Status (Gait) able to maintain   Comment, (Gait/Stairs) Pt was able to walk with FWW w/ CGA x1 for 10ft in the room, but soom reported pain at her incision site.   Balance   Balance Comments CGA with FWW   Sensory Examination   Sensory Perception WFL   Sensory Perception Comments Pt reported dimished sensation with her R foot yesterday that was alleviated with movement.   Clinical Impression   Criteria for Skilled Therapeutic Intervention Yes, treatment indicated   PT Diagnosis (PT) impaired functional mobility   Influenced by the following impairments pain, activity tolerance, weakness, fatigue, dizziness   Functional limitations due to impairments bed mobility, transfers, gait, stairs   Clinical Presentation (PT Evaluation Complexity) stable   Clinical Presentation Rationale pt presenatation, clinical reasoning, chart review   Clinical Decision Making (Complexity) low complexity   Planned Therapy Interventions (PT) bed mobility training;gait training;home  exercise program;neuromuscular re-education;patient/family education;stair training;transfer training;progressive activity/exercise   Risk & Benefits of therapy have been explained evaluation/treatment results reviewed;care plan/treatment goals reviewed;risks/benefits reviewed;current/potential barriers reviewed;participants voiced agreement with care plan;participants included;patient   Clinical Impression Comments treatment is indicated by the current status of pt. Impaired functional mobilities include bed mobility, transfers, gait and stairs due to pain at incision site. Skilled PT can be beneficial in order to improve functional mobility and return to PLOF.   PT Total Evaluation Time   PT Eval, Low Complexity Minutes (78147) 10   Physical Therapy Goals   PT Frequency 3x/week   PT Predicted Duration/Target Date for Goal Attainment 08/27/24   PT Goals Bed Mobility;Gait;Transfers;Stairs   PT: Bed Mobility Independent;Rolling;Within precautions   PT: Transfers Independent;Sit to/from stand;Within precautions   PT: Gait Independent;Greater than 200 feet;Within precautions   PT: Stairs Independent;Rail on right;Greater than 10 stairs;Within precautions   PT Discharge Planning   PT Plan bed mobility, STS transfers, stairs and gait   PT Discharge Recommendation (DC Rec) home with assist;home with home care physical therapy   PT Rationale for DC Rec Pt demonstrated decreased tolerance on activities due to pain and dizziness. Pt stated that  is able to provide support and care. Pt may be benefited from home with assist regarding ADLs and mobility such as stairs and transfers.   PT Brief overview of current status Pain at incision site, feeling light headed.   PT Equipment Needed at Discharge walker, rolling   Total Session Time   Total Session Time (sum of timed and untimed services) 10

## 2024-08-20 NOTE — PLAN OF CARE
Goal Outcome Evaluation:      Plan of Care Reviewed With: patient    Overall Patient Progress: no changeOverall Patient Progress: no change    Outcome Evaluation: Patient reports pain relief with Dilaudid.    Patient arrived to floor approx 1900,awake,reports intermittent pain to mid chest extends to right UE, relief with Dilaudid. HR tachycardic,as reported from PACU,MD notified.Voiding,no flatus.Stood at bedside,light headed reported with activity and when turn head at times Initiated IS with adequate technique. Zofran given X 1 for nausea with stated relief.Continue to monitor

## 2024-08-20 NOTE — PROVIDER NOTIFICATION
Provider paged; Ren, MARGIE 7B 27 and spouse wound like to see a Team Member regarding pain control; per pt. the Q4hrs. intervals for her Oxycodone is not effective, she start to feel unbearable pain after 3hrs. Thanks, Chelsea #[phone].

## 2024-08-20 NOTE — DISCHARGE SUMMARY
NAME: Shayla Mcclure   MRN: [id number]   : 1985     DATE OF ADMISSION: 2024     PRE/POSTOPERATIVE DIAGNOSES: Pericardial cyst    PROCEDURES PERFORMED: Right thoracoscopic resection of pericardial cyst with Dr. Huerta - 2024    PATHOLOGY RESULTS: Pending at time of discharge     CULTURE RESULTS: None     INTRAOPERATIVE COMPLICATIONS: None     POSTOPERATIVE MEDICAL ISSUES: None     DRAINS/TUBES PRESENT AT DISCHARGE: dressing changes    DATE OF DISCHARGE:  2024    HOSPITAL COURSE: Shayla Mcclure is a 39 year old female who on 2024 underwent the above-named procedures. She tolerated the operation well and postoperatively was transferred to the general post-surgical unit.  The remainder of her course was essentially uncomplicated.  Prior to discharge, her pain was controlled well, she was able to perform ADLs and ambulate independently without difficulty, and had full return of bowel and bladder function.  On 2024, she was discharged home in stable condition.    DISCHARGE EXAM:   A&O, NAD  Resp non-labored  Distal extremities warm    Incisions CDI     DISCHARGE INSTRUCTIONS:  Discharge Procedure Orders   Reason for your hospital stay   Order Comments: Surgery     Activity   Order Comments: Your activity upon discharge: activity as tolerated     Order Specific Question Answer Comments   Is discharge order? Yes      Discharge Instructions   Order Comments: THORACIC SURGERY DISCHARGE INSTRUCTIONS    DIET: Regular diet - as prior to admission     If your plans upon discharge include prolonged periods of sitting (i.e a lengthy car or plane ride), it is highly beneficial to get up and walk at least once per hour to help prevent swelling and blood clots.     You may remove chest tube dressing 48 hours after tube removal and bandage the site at your own discretion thereafter.  Small amounts of leakage are normal for 2-3 days after removal.  Feel free to call with questions.    You may get  "incision wet 2 days after operation. Do not submerge, soak, or scrub incision or swim until seen in follow-up.    Take incentive spirometer home for continued frequent use    Activity as tolerated, no strenous activity until seen in follow-up, no lifting greater than 20 pounds for the next 2 weeks.    Stay hydrated. Take over the counter fiber (metamucil or benefiber) and stool softeners (Miralax, docusate or senna) if becoming constipated.     Call for fever greater than 101.5, chills, increased size of incision, red skin around incision, vision changes, muscle strength changes, sensation changes, shortness of breath, or other concerns.    No driving while taking narcotic pain medication.    Transition to ibuprofen or tylenol/acetaminophen for pain control. Do not take tylenol/acetaminophen and acetaminophen containing narcotic (e.g., percocet or vicodin) at the same time. If you have known ulcer problems, or kidney trouble (elevated creatinine) do not take the ibuprofen.    In emergencies, call 911    For other Questions or Concerns;   A.) During weekday working hours (Monday through Friday 8am to 4:30pm)   call [phone] and ask to speak to a thoracic surgery nurse (RN or LPN).     B.) At nights (after 4:30pm), on weekends, or if urgent call [phone] and   tell the  \"I would like to page job code [phone], the thoracic surgery   fellow on call, please.\"     Adult [facility] Follow-up and recommended labs and tests   Order Comments: 1.) Follow up with primary care physician, Margaret Aragon in 1-2 weeks.  2.) Follow up with Whit Wynne, Clinical Nurse Specialist, in Thoracic Surgery clinic on 9/12/2024, prior to which a CXR should be performed (details below).     9/12/2024  XR GENERAL XRAY  1:25 PM  (20 min.)  MICXR1  [facility]    RETURN  2:15 PM  (30 min.)  Whit Wynne,  CLAUDIA [facility] Special[facility]  Clinic Beam      Appointments on " [employer] and/or [facility] (with [facility] or [facility] provider or service). Call [phone] if you haven't heard regarding these appointments within 7 days of discharge.     Diet   Order Comments: Follow this diet upon discharge: Orders Placed This Encounter      Advance Diet as Tolerated: Regular Diet Adult     Order Specific Question Answer Comments   Is discharge order? Yes        DISCHARGE MEDICATIONS:   Current Discharge Medication List        START taking these medications    Details   acetaminophen (TYLENOL) 325 MG tablet Take 2 tablets (650 mg) by mouth every 4 hours as needed for other (For optimal non-opioid multimodal pain management to improve pain control.)    Associated Diagnoses: Pericardial cyst      oxyCODONE (ROXICODONE) 5 MG tablet Take 1 tablet (5 mg) by mouth every 6 hours as needed for pain  Qty: 30 tablet, Refills: 0    Associated Diagnoses: Pericardial cyst      polyethylene glycol (MIRALAX) 17 g packet Take 17 g by mouth daily  Qty: 7 packet, Refills: 0    Associated Diagnoses: Pericardial cyst      senna-docusate (SENOKOT-S/PERICOLACE) 8.6-50 MG tablet Take 1 tablet by mouth 2 times daily  Qty: 14 tablet, Refills: 0    Associated Diagnoses: Pericardial cyst           CONTINUE these medications which have NOT CHANGED    Details   tretinoin (RETIN-A) 0.025 % external cream Apply a pea-sized amount evenly over face at nighttime before bed. Start using every other night.  Qty: 45 g, Refills: 11    Associated Diagnoses: Acne vulgaris

## 2024-08-20 NOTE — PLAN OF CARE
Goal Outcome Evaluation:      Plan of Care Reviewed With: patient    Overall Patient Progress: no changeOverall Patient Progress: no change  Pt AOx4, cooperative with cares. C/o right upper mild chest pain intermittently, managed with PRN oxycodone. Encouraged the use of IS, tachy at times. (R) CT incision site covered with dressing. Voiding adequately, no BM, -flatus. SBA to the BSC. Pain management, continue POC

## 2024-08-20 NOTE — PHARMACY-ADMISSION MEDICATION HISTORY
Pharmacist Admission Medication History    Admission medication history is complete. The information provided in this note is only as accurate as the sources available at the time of the update.    Information Source(s): Patient, Family member, and CareEverywhere/SureScripts via phone    Pertinent Information: Only using tretinoin cream every-other evening. Denied use of any other prescription or OTC medications.    Changes made to PTA medication list:  Added: None  Deleted: None  Changed: None    Allergies reviewed with patient and updates made in EHR: yes - updated clindamycin allergy to include reaction (rash/hives about 8 years ago)    Medication History Completed By: Emigdio Arenas RPH 8/20/2024 5:59 PM    PTA Med List   Medication Sig Last Dose    tretinoin (RETIN-A) 0.025 % external cream Apply a pea-sized amount evenly over face at nighttime before bed. Start using every other night. 8/18/2024

## 2024-08-21 ENCOUNTER — PATIENT OUTREACH (OUTPATIENT)
Dept: SURGERY | Facility: CLINIC | Age: 39
End: 2024-08-21
Payer: COMMERCIAL

## 2024-08-21 ENCOUNTER — PATIENT OUTREACH (OUTPATIENT)
Dept: CARE COORDINATION | Facility: CLINIC | Age: 39
End: 2024-08-21
Payer: COMMERCIAL

## 2024-08-21 DIAGNOSIS — G89.18 POST-OP PAIN: Primary | ICD-10-CM

## 2024-08-21 RX ORDER — OXYCODONE HYDROCHLORIDE 5 MG/1
5 TABLET ORAL 4 TIMES DAILY PRN
Qty: 42 TABLET | Refills: 0 | Status: SHIPPED | OUTPATIENT
Start: 2024-08-21 | End: 2024-09-26

## 2024-08-21 NOTE — DISCHARGE SUMMARY
Pt discharge, need discharge order for Oxycodone to be send to pharmacy [facility] in [place] [address]

## 2024-08-21 NOTE — PROGRESS NOTES
Oncall provider paged regarding missing oxy script for discharge. Patient upset and provider notified and asked to come see patient or call nursing ASAP.       Oncall provider called back and will address oxy with the team.Patient left and provider informed.

## 2024-08-21 NOTE — PLAN OF CARE
Physical Therapy Discharge Summary    Reason for therapy discharge:    Discharged to home with home therapy.    Progress towards therapy goal(s). See goals on Care Plan in [facility] electronic health record for goal details.  Goals partially met.  Barriers to achieving goals:   limited tolerance for therapy and discharge from facility.    Therapy recommendation(s):    Continued therapy is recommended.  Rationale/Recommendations:  to improve functional mobility.

## 2024-08-21 NOTE — PROVIDER NOTIFICATION
Team paged; Ren. L 7B 27 needs script for her Oxycodone, it is  not added to the list of medications that were sent to the hospital's discharge pharmacy so it was not given to the patient. Thanks, Chelsea, #[phone].

## 2024-08-21 NOTE — TELEPHONE ENCOUNTER
Post Op Discharge Call    Surgery: Right thoracoscopic resection of pericardial cyst with Dr. Huerta     Surgery date: 8/19/2024    Discharge Date:  8/20/24    Immediate Concerns: Reports that she has many questions. States that her discharge was 'crazy and unorganized'. Reports her discharge instructions were never reviewed with her at discharge. Was sent home with no prescription for pain medication.     Reports that she was supposed to be given a Bath Chair and Walker at discharge and did not receive them. States that she was given a phone number to call.   Reports that last evening after discharging home, she would get shaky, clammy and lightheaded when she would stand up. Patient informed writer that just prior to discharge she was given tylenol, ibuprofen, oxycodone and robaxin.    Pain:  Rating pain 5/10. Burning pain around her ribs and breast and sharp pain with tries to take a deep breath. Reviewed with patient that the burning and hypersensitive sensation is likely the nerve pain we spoke about pre-operatively. Spoke with patient at length about pain management. Encouraged patient to alternate between tylenol and ibuprofen on a scheduled basis (06, 09, 12, 3, 6, 9) and take the Robaxin and/or oxycodone as needed for pain >5/10. Encouraged patient to use ice or heat. Instructed patient to wait at least 1 hour in between taking oxycodone and robaxin and encouraged patient to change positions slowly when taking narcotic pain medication.    Updated CLAUDIA Wynne on status. Oxycodone prescription sent to patient's preferred pharmacy.     Incision:   No concerns, healing well, no redness, drainage or edema reported.  Reviewed dressing change instructions and showering instructions with patient.     Drains:   No drain.     Diet:   Regular diet     Bowels:   Patient reports she has NOT had bowel movement post op.  Passing flatus. Reports last Bowel Movement was Monday morning before surgery. Instructed  patient to take Miralax twice a day and Senna once a day and to continue this while taking narcotic pain medication. Informed patient that she can increase the senna to twice a day if needed. Encouraged patient to drink plenty of water/fluids to stay hydrated and to increase activity.     Respiratory:  Reports that she gets out of breath when talking. This was apparent at beginning of call but patient did not sound out of breath while talking towards the end of the 38 minute call.  Reports has productive cough. Coughing up sputum with small specs of blood. Reassured patient this is to be expected and reviewed when to be concerned and call.   Reports doing the Incentive Spirometer but only getting to 500. Encouraged patient to continue to use every hour. To do 3 good long breaths every hour.     Activity:   No difficulty with ADLs reported.   Patient is up independently at home.     Post op/follow up plans:   Will follow up with patient this afternoon.      Future Appointments   Date Time Provider Department Center   9/12/2024  1:40 PM MICXR1 JNXRIC [facility] IM   9/12/2024  2:30 PM Whit Wynne APRN CNS [facility]   9/26/2024 10:40 AM Margaret Aragon PAIVETTE PHFP [facility]         Patient has our direct number for any questions or concerns that may arise.      Susi Franco RN, BSN  Thoracic Surgery RN Care Coordinator

## 2024-08-21 NOTE — TELEPHONE ENCOUNTER
Thoracic Surgery APRN notified by [facility] that patient's  picked up patient's prescription for oxycodone. Patient reviewed medications that patient's picked up from pharmacy last night at time of discharge. Read medication in each bottle to writer. Ibuprofen, Tylenol, Robaxin and Senna. Patient woke up  to ask if he went to the pharmacy on day of surgery,  did not remember.    Thoracic Surgery APRN notified by [facility] that patient's  signed for the Oxycodone at 3:41pm on Monday Aug 19th.  Called and informed patient of record. Patient verbalized that  was not fully awake when she previous asked him.   reports that he did go to the pharmacy on Monday and did  patient's oxycodone prescription on Monday, but as a result of the chaos of trying to figure out if patient was being discharged or being admitting, he can't remember where he put the bottle.  looking in car and house.     Received voicemail message from patient at 12:00 stating that her  found the prescription bottle for the oxycodone.   Prescribing provider contacted [facility]'s pharmacy and canceled new prescription. Patient aware.    Susi Franco RN, BSN  Thoracic Surgery RN Care Coordinator

## 2024-08-21 NOTE — PROGRESS NOTES
[facility]    Background: Transitional Care Management program identified per system criteria and reviewed by [facility] team for possible outreach.    Assessment: Upon chart review, CCR Team member will not proceed with patient outreach related to this episode of Transitional Care Management program due to reason below:    Patient has active communication with a nurse, provider or care team for reason of post-hospital follow up plan.  Outreach call by CCR team not indicated to minimize duplicative efforts.     Plan: Transitional Care Management episode addressed appropriately per reason noted above.          FRANCO Anderson  [phone]  [facility]

## 2024-08-30 ENCOUNTER — APPOINTMENT (OUTPATIENT)
Dept: GENERAL RADIOLOGY | Facility: CLINIC | Age: 39
End: 2024-08-30
Attending: EMERGENCY MEDICINE
Payer: COMMERCIAL

## 2024-08-30 ENCOUNTER — HOSPITAL ENCOUNTER (EMERGENCY)
Facility: CLINIC | Age: 39
Discharge: HOME | End: 2024-08-30
Attending: EMERGENCY MEDICINE | Admitting: EMERGENCY MEDICINE
Payer: COMMERCIAL

## 2024-08-30 ENCOUNTER — PATIENT OUTREACH (OUTPATIENT)
Dept: SURGERY | Facility: CLINIC | Age: 39
End: 2024-08-30
Payer: COMMERCIAL

## 2024-08-30 VITALS
TEMPERATURE: 98.5 F | OXYGEN SATURATION: 99 % | HEIGHT: 64 IN | WEIGHT: 147.8 LBS | HEART RATE: 73 BPM | BODY MASS INDEX: 25.23 KG/M2 | SYSTOLIC BLOOD PRESSURE: 116 MMHG | RESPIRATION RATE: 18 BRPM | DIASTOLIC BLOOD PRESSURE: 75 MMHG

## 2024-08-30 DIAGNOSIS — M79.601 PAIN OF RIGHT UPPER EXTREMITY: ICD-10-CM

## 2024-08-30 DIAGNOSIS — R20.2 PARESTHESIA: ICD-10-CM

## 2024-08-30 LAB
ALBUMIN SERPL BCG-MCNC: 3.9 G/DL (ref 3.5–5.2)
ALP SERPL-CCNC: 97 U/L (ref 40–150)
ALT SERPL W P-5'-P-CCNC: 120 U/L (ref 0–50)
ANION GAP SERPL CALCULATED.3IONS-SCNC: 13 MMOL/L (ref 7–15)
AST SERPL W P-5'-P-CCNC: ABNORMAL U/L
BASOPHILS # BLD AUTO: 0.1 10E3/UL (ref 0–0.2)
BASOPHILS NFR BLD AUTO: 1 %
BILIRUB SERPL-MCNC: <0.2 MG/DL
BUN SERPL-MCNC: 11.6 MG/DL (ref 6–20)
CALCIUM SERPL-MCNC: 9.1 MG/DL (ref 8.8–10.4)
CHLORIDE SERPL-SCNC: 104 MMOL/L (ref 98–107)
CREAT SERPL-MCNC: 0.45 MG/DL (ref 0.51–0.95)
EGFRCR SERPLBLD CKD-EPI 2021: >90 ML/MIN/1.73M2
EOSINOPHIL # BLD AUTO: 0.3 10E3/UL (ref 0–0.7)
EOSINOPHIL NFR BLD AUTO: 4 %
ERYTHROCYTE [DISTWIDTH] IN BLOOD BY AUTOMATED COUNT: 12.8 % (ref 10–15)
GLUCOSE BLDC GLUCOMTR-MCNC: 112 MG/DL (ref 70–99)
GLUCOSE SERPL-MCNC: 104 MG/DL (ref 70–99)
HCO3 SERPL-SCNC: 21 MMOL/L (ref 22–29)
HCT VFR BLD AUTO: 35.6 % (ref 35–47)
HGB BLD-MCNC: 12.1 G/DL (ref 11.7–15.7)
IMM GRANULOCYTES # BLD: 0 10E3/UL
IMM GRANULOCYTES NFR BLD: 0 %
LYMPHOCYTES # BLD AUTO: 2.9 10E3/UL (ref 0.8–5.3)
LYMPHOCYTES NFR BLD AUTO: 38 %
MCH RBC QN AUTO: 29 PG (ref 26.5–33)
MCHC RBC AUTO-ENTMCNC: 34 G/DL (ref 31.5–36.5)
MCV RBC AUTO: 85 FL (ref 78–100)
MONOCYTES # BLD AUTO: 0.4 10E3/UL (ref 0–1.3)
MONOCYTES NFR BLD AUTO: 5 %
NEUTROPHILS # BLD AUTO: 4.1 10E3/UL (ref 1.6–8.3)
NEUTROPHILS NFR BLD AUTO: 53 %
NRBC # BLD AUTO: 0 10E3/UL
NRBC BLD AUTO-RTO: 0 /100
PLATELET # BLD AUTO: 414 10E3/UL (ref 150–450)
POTASSIUM SERPL-SCNC: 4.5 MMOL/L (ref 3.4–5.3)
PROT SERPL-MCNC: 6.9 G/DL (ref 6.4–8.3)
RBC # BLD AUTO: 4.17 10E6/UL (ref 3.8–5.2)
SODIUM SERPL-SCNC: 138 MMOL/L (ref 135–145)
WBC # BLD AUTO: 7.7 10E3/UL (ref 4–11)

## 2024-08-30 PROCEDURE — 71046 X-RAY EXAM CHEST 2 VIEWS: CPT

## 2024-08-30 PROCEDURE — 36415 COLL VENOUS BLD VENIPUNCTURE: CPT | Performed by: EMERGENCY MEDICINE

## 2024-08-30 PROCEDURE — 99283 EMERGENCY DEPT VISIT LOW MDM: CPT | Performed by: EMERGENCY MEDICINE

## 2024-08-30 PROCEDURE — 93005 ELECTROCARDIOGRAM TRACING: CPT | Performed by: EMERGENCY MEDICINE

## 2024-08-30 PROCEDURE — 82962 GLUCOSE BLOOD TEST: CPT

## 2024-08-30 PROCEDURE — 99285 EMERGENCY DEPT VISIT HI MDM: CPT | Mod: 25 | Performed by: EMERGENCY MEDICINE

## 2024-08-30 PROCEDURE — 84155 ASSAY OF PROTEIN SERUM: CPT | Performed by: EMERGENCY MEDICINE

## 2024-08-30 PROCEDURE — 82040 ASSAY OF SERUM ALBUMIN: CPT | Performed by: EMERGENCY MEDICINE

## 2024-08-30 PROCEDURE — 85025 COMPLETE CBC W/AUTO DIFF WBC: CPT | Performed by: EMERGENCY MEDICINE

## 2024-08-30 PROCEDURE — 93010 ELECTROCARDIOGRAM REPORT: CPT | Mod: 59 | Performed by: EMERGENCY MEDICINE

## 2024-08-30 ASSESSMENT — ACTIVITIES OF DAILY LIVING (ADL): ADLS_ACUITY_SCORE: 39

## 2024-08-30 ASSESSMENT — COLUMBIA-SUICIDE SEVERITY RATING SCALE - C-SSRS
2. HAVE YOU ACTUALLY HAD ANY THOUGHTS OF KILLING YOURSELF IN THE PAST MONTH?: NO
1. IN THE PAST MONTH, HAVE YOU WISHED YOU WERE DEAD OR WISHED YOU COULD GO TO SLEEP AND NOT WAKE UP?: NO
6. HAVE YOU EVER DONE ANYTHING, STARTED TO DO ANYTHING, OR PREPARED TO DO ANYTHING TO END YOUR LIFE?: NO

## 2024-08-30 NOTE — TELEPHONE ENCOUNTER
"[facility]: Thoracic Surgery                                                                                         Called patient to follow up on message received from triage. Spoke with patient. Patient reports that she woke up this morning with severe right shoulder blade pain. States pain is constant but when takes a deep breath she experiences 12/10 pain in her scapula the goes dep into her chest. Reports some shortness of breath. Only taking Robaxin, ibuprofen and tylenol for pain. Has not been using ice or heat but reports that warm showers have been helping with the pain but it returns immediately after getting out of the shower. Also reports that her right arm feels \"heavy\". Denies numbness and tingling. Is able to move and pick things up with right arm but states that it \"feels weird and tires easily\".    Updated Dr Huerta via [employer]. Dr Huerta recommends patient come the ED for Chest Xray and evaluation. Informed patient. Patient expressed concern about ED wait time and having to leave her daughter with her cousin for an extended period of time. Inquired if could go to [facility]. Informed patient that it advised that patient go to [facility] ED as her Thoracic Surgery team is accessible should any interventions be needed. Patient verbalized her understanding and appreciated writer's call.     Susi Franco RN, BSN  Thoracic Surgery RN Care Coordinator      Signature:  Susi Franco RN  "

## 2024-08-31 NOTE — DISCHARGE INSTRUCTIONS
Your body has been through a lot but appears to be healing quite well.  Your chest x-ray looks great as well as your EKG and nothing worrisome on your lab work.  Your incision looks good as well.

## 2024-08-31 NOTE — ED PROVIDER NOTES
History     Chief Complaint   Patient presents with    Numbness    Chest Pain     HPI  Shayla Mcclure is a 39 year old female who presents with some heaviness in her right arm.  11 days ago she underwent VATS surgery to remove pericardial cyst through the right chest.  Today she has had some heaviness in her right arm.  No weakness.  Felt some tingling in her hand that is improved.  Also some pain in her posterior right shoulder.  No trauma.  No fever.  No dyspnea.  She has been doing a lot with her child.    Allergies:  Allergies   Allergen Reactions    Clindamycin Hives and Rash       Problem List:    Patient Active Problem List    Diagnosis Date Noted    Pericardial cyst 07/18/2024     Priority: Medium    Intramural leiomyoma of uterus 10/19/2022     Priority: Medium     Formatting of this note might be different from the original. History of myomectomy through pfannenstiel incision [place] - attempting to get op report. Plan Repeat surgery at 36-(with steroids) or 37 (without steroids) weeks.          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 RIGHT thoracoscopic resection of pericardial cyst   Right 08/19/2024    Pericardial cyst along right cardiophrenic angle    C/SECTION, LOW TRANSVERSE      delivered at 27w3d for pre-eclampsia with severe features     COSMETIC MAMMOPLASTY AUGMENTATION BILATERAL      when 16 years old    DILATION AND CURETTAGE SUCTION N/A 04/16/2020    Procedure: DILATION AND CURETTAGE, UTERUS, USING SUCTION;  Surgeon: Haydee Mcpherson DO;  Location: [facility] OR    DILATION AND CURETTAGE SUCTION  09/23/2021    repeat procedure for retained POC after prior suction D&C 09/20/2021    DILATION AND CURETTAGE SUCTION N/A 09/20/2021    Procedure: DILATION AND CURETTAGE, UTERUS, USING SUCTION;  Surgeon: Ky  "DO Haydee;  Location:  OR    MYOMECTOMY UTERUS  2012    open myomectomy in [place], reported very large fibroids    THORACOSCOPIC WEDGE RESECTION LUNG Right 8/19/2024    Procedure: RIGHT THORACOSCOPIC RESECTION of pericardial cyst;  Surgeon: Gideon Huerta MD;  Location: [facility] OR       Family History:    Family History   Problem Relation Age of Onset    Other - See Comments Mother         heavy menstrual cycles    Hashimoto's thyroiditis Mother     Lupus Father     Leukemia Maternal Grandmother     Lupus Maternal Aunt     Cancer No family hx of         Scooter's Syndrome    Anesthesia Reaction No family hx of     Venous thrombosis No family hx of        Social History:  Marital Status:   [2]  Social History     Tobacco Use    Smoking status: Never     Passive exposure: Never    Smokeless tobacco: Never   Vaping Use    Vaping status: Never Used   Substance Use Topics    Alcohol use: Yes     Comment: only birthdays and holidays    Drug use: No        Medications:    acetaminophen (TYLENOL) 325 MG tablet  ibuprofen (ADVIL/MOTRIN) 800 MG tablet  methocarbamol (ROBAXIN) 750 MG tablet  methocarbamol (ROBAXIN) 750 MG tablet  oxyCODONE (ROXICODONE) 5 MG tablet  polyethylene glycol (MIRALAX) 17 g packet  senna-docusate (SENOKOT-S/PERICOLACE) 8.6-50 MG tablet  tretinoin (RETIN-A) 0.025 % external cream          Review of Systems  All other systems are reviewed and are negative    Physical Exam   BP: 117/80  Pulse: 99  Temp: 98.5  F (36.9  C)  Resp: 20  Height: 162.6 cm (5' 4\")  Weight: 67 kg (147 lb 12.8 oz)      Physical Exam  Vitals and nursing note reviewed.   Constitutional:       General: She is not in acute distress.     Appearance: She is well-developed. She is not diaphoretic.   HENT:      Head: Normocephalic and atraumatic.      Nose: Nose normal.      Mouth/Throat:      Mouth: Mucous membranes are moist.      Pharynx: Oropharynx is clear.   Eyes:      General: No scleral icterus.     " Conjunctiva/sclera: Conjunctivae normal.   Cardiovascular:      Rate and Rhythm: Normal rate and regular rhythm.      Heart sounds: Normal heart sounds. No murmur heard.  Pulmonary:      Effort: Pulmonary effort is normal. No respiratory distress.      Breath sounds: No stridor. No wheezing or rales.   Chest:          Comments: Right inferolateral chest wall incision is clean, dry and without signs of infection.  Steri-Strips in place.  Abdominal:      General: Abdomen is flat. There is no distension.      Palpations: Abdomen is soft. There is no mass.      Tenderness: There is no abdominal tenderness. There is no guarding or rebound.   Musculoskeletal:         General: No tenderness.      Cervical back: Normal range of motion and neck supple.   Skin:     General: Skin is warm and dry.      Coloration: Skin is not pale.      Findings: No erythema or rash.   Neurological:      General: No focal deficit present.      Mental Status: She is alert.      GCS: GCS eye subscore is 4. GCS verbal subscore is 5. GCS motor subscore is 6.      Cranial Nerves: Cranial nerves 2-12 are intact.      Motor: Motor function is intact. No weakness, atrophy or pronator drift.   Psychiatric:         Mood and Affect: Mood normal.         ED Course        Procedures         EKG: Normal sinus rhythm, normal axis.  Rate of 74 beats per minute.  No acute ST or T wave changes.  Interpreted by myself.           Results for orders placed or performed during the hospital encounter of 08/30/24 (from the past 24 hour(s))   Glucose by meter   Result Value Ref Range    GLUCOSE BY METER POCT 112 (H) 70 - 99 mg/dL   CBC with platelets differential    Narrative    The following orders were created for panel order CBC with platelets differential.  Procedure                               Abnormality         Status                     ---------                               -----------         ------                     CBC with platelets and  sam.[[id number]]                      Final result                 Please view results for these tests on the individual orders.   Comprehensive metabolic panel   Result Value Ref Range    Sodium 138 135 - 145 mmol/L    Potassium 4.5 3.4 - 5.3 mmol/L    Carbon Dioxide (CO2) 21 (L) 22 - 29 mmol/L    Anion Gap 13 7 - 15 mmol/L    Urea Nitrogen 11.6 6.0 - 20.0 mg/dL    Creatinine 0.45 (L) 0.51 - 0.95 mg/dL    GFR Estimate >90 >60 mL/min/1.73m2    Calcium 9.1 8.8 - 10.4 mg/dL    Chloride 104 98 - 107 mmol/L    Glucose 104 (H) 70 - 99 mg/dL    Alkaline Phosphatase 97 40 - 150 U/L    AST       (H) 0 - 50 U/L    Protein Total 6.9 6.4 - 8.3 g/dL    Albumin 3.9 3.5 - 5.2 g/dL    Bilirubin Total <0.2 <=1.2 mg/dL   CBC with platelets and differential   Result Value Ref Range    WBC Count 7.7 4.0 - 11.0 10e3/uL    RBC Count 4.17 3.80 - 5.20 10e6/uL    Hemoglobin 12.1 11.7 - 15.7 g/dL    Hematocrit 35.6 35.0 - 47.0 %    MCV 85 78 - 100 fL    MCH 29.0 26.5 - 33.0 pg    MCHC 34.0 31.5 - 36.5 g/dL    RDW 12.8 10.0 - 15.0 %    Platelet Count 414 150 - 450 10e3/uL    % Neutrophils 53 %    % Lymphocytes 38 %    % Monocytes 5 %    % Eosinophils 4 %    % Basophils 1 %    % Immature Granulocytes 0 %    NRBCs per 100 WBC 0 <1 /100    Absolute Neutrophils 4.1 1.6 - 8.3 10e3/uL    Absolute Lymphocytes 2.9 0.8 - 5.3 10e3/uL    Absolute Monocytes 0.4 0.0 - 1.3 10e3/uL    Absolute Eosinophils 0.3 0.0 - 0.7 10e3/uL    Absolute Basophils 0.1 0.0 - 0.2 10e3/uL    Absolute Immature Granulocytes 0.0 <=0.4 10e3/uL    Absolute NRBCs 0.0 10e3/uL   XR Chest 2 Views    Narrative    EXAM: XR CHEST 2 VIEWS  LOCATION: [facility]  DATE: 8/30/2024    INDICATION: chest pain  COMPARISON: 7/30/2024      Impression    IMPRESSION: Tiny right pleural effusion. Pericardial cyst has been resected. Otherwise negative chest.       Medications - No data to display    Assessments & Plan (with Medical Decision Making)  39-year-old  female who is 11 days status post VATS surgery for pericardial cyst removal.  She had some intermittent paresthesias of the right hand.  Strength is great.  Incision looks excellent without any signs of infection or concern.  Chest x-ray, labs and EKG are reassuring as well.  No signs of acute emergency medical condition.  Appears stable for discharge home tonight.  Return if condition worsens or any other concern     I have reviewed the nursing notes.    I have reviewed the findings, diagnosis, plan and need for follow up with the patient.          New Prescriptions    No medications on file       Final diagnoses:   Pain of right upper extremity   Paresthesia       8/30/2024   [facility] EMERGENCY DEPT       Jordi Lucas MD  08/30/24 2036

## 2024-08-31 NOTE — ED TRIAGE NOTES
C/o of right arm numbness/ heaviness and sharp chest pain.  States  the arm numbness started this morning and the right arm heaviness started this afternoon. Code stroke eval called, Dr. Lucas in room evaluating pt.      Triage Assessment (Adult)       Row Name 08/30/24 1916          Triage Assessment    Airway WDL WDL        Respiratory WDL    Respiratory WDL WDL        Skin Circulation/Temperature WDL    Skin Circulation/Temperature WDL WDL

## 2024-09-06 NOTE — PROGRESS NOTES
THORACIC SURGERY FOLLOW UP VISIT      I saw Mrs. Mcclure in follow-up today. The clinical summary follows:     PREOP DIAGNOSIS   Pericardial cyst along the right cardiophrenic angle  PROCEDURE   Right thoracoscopic resection of pericardial cyst    DATE OF PROCEDURE  08/19/2024    HISTOPATHOLOGY   PERICARDIUM, PERICARDIAL CYST, EXCISION:  -Benign mesothelial multilocular cyst.  -One benign lymph node.    COMPLICATIONS  None    INTERVAL STUDIES  CXR 09/09/2024: Resolved tiny right pleural effusion. No focal airspace disease.         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 RIGHT thoracoscopic resection of pericardial cyst   Right 08/19/2024    Pericardial cyst along right cardiophrenic angle    C/SECTION, LOW TRANSVERSE      delivered at 27w3d for pre-eclampsia with severe features     COSMETIC MAMMOPLASTY AUGMENTATION BILATERAL      when 16 years old    DILATION AND CURETTAGE SUCTION N/A 04/16/2020    Procedure: DILATION AND CURETTAGE, UTERUS, USING SUCTION;  Surgeon: Haydee Mcpherson DO;  Location:  OR    DILATION AND CURETTAGE SUCTION  09/23/2021    repeat procedure for retained POC after prior suction D&C 09/20/2021    DILATION AND CURETTAGE SUCTION N/A 09/20/2021    Procedure: DILATION AND CURETTAGE, UTERUS, USING SUCTION;  Surgeon: Haydee Mcpherson DO;  Location:  OR    MYOMECTOMY UTERUS  2012    open myomectomy in [place], reported very large fibroids    THORACOSCOPIC WEDGE RESECTION LUNG Right 8/19/2024    Procedure: RIGHT THORACOSCOPIC RESECTION of pericardial cyst;  Surgeon: Gideon Huerta MD;  Location:  OR      Social History     Socioeconomic History    Marital status:      Spouse name: Not on file    Number of children: Not on file    Years of education:  Not on file    Highest education level: Not on file   Occupational History    Occupation: homemaker   Tobacco Use    Smoking status: Never     Passive exposure: Never    Smokeless tobacco: Never   Vaping Use    Vaping status: Never Used   Substance and Sexual Activity    Alcohol use: Yes     Comment: only birthdays and holidays    Drug use: No    Sexual activity: Yes     Partners: Male     Birth control/protection: None   Other Topics Concern    Parent/sibling w/ CABG, MI or angioplasty before 65F 55M? Not Asked   Social History Narrative    Patient and her  moved to [place] (from [place] via [place]) for their daughter with Scooter's Syndrome.     Very happy in [place].     Silvia Leyva     Social Determinants of Health     Financial Resource Strain: Patient Declined (4/12/2023)    Received from [facility]    Overall Financial Resource Strain (CARDIA)     Difficulty of Paying Living Expenses: Patient declined   Food Insecurity: No Food Insecurity (4/12/2023)    Received from [facility]    Hunger Vital Sign     Worried About Running Out of Food in the Last Year: Never true     Ran Out of Food in the Last Year: Never true   Transportation Needs: No Transportation Needs (4/12/2023)    Received from [facility]    PRAPARE - Transportation     Lack of Transportation (Medical): No     Lack of Transportation (Non-Medical): No   Physical Activity: Insufficiently Active (4/12/2023)    Received from [facility]    Exercise Vital Sign     Days of Exercise per Week: 2 days     Minutes of Exercise per Session: 10 min   Stress: No Stress Concern Present (4/12/2023)    Received from [facility]    [place] [employer] - Occupational Stress Questionnaire     Feeling of Stress : Only a little   Social Connections: Moderately Isolated (4/12/2023)    Received from [facility]    Social Connection and Isolation Panel [NHANES]      Frequency of Communication with Friends and Family: Once a week     Frequency of Social Gatherings with Friends and Family: Never     Attends Caoda[employer] Services: 1 to 4 times per year     Active Member of Clubs or Organizations: No     Attends Club or Organization Meetings: Never     Marital Status:    Interpersonal Safety: Not At Risk (4/12/2023)    Received from [facility]    Humiliation, Afraid, Rape, and Kick questionnaire     Fear of Current or Ex-Partner: No     Emotionally Abused: No     Physically Abused: No     Sexually Abused: No   Housing Stability: Unknown (4/12/2023)    [facility]    Housing Stability Vital Sign     Unable to Pay for Housing in the Last Year: Patient refused     Number of Places Lived in the Last Year: 2     Unstable Housing in the Last Year: No      SUBJECTIVE   Shayla is doing ok. She is not using oxycodone for pain but the muscle relaxer does help. She needs a refill of this. She does note complete resolution of the symptoms she had prior to having the cyst removed. She does not get sharp pain in the middle of her chest anymore and she is able to breath easier now-especially with deep breaths. She no longer has a cough and she has more energy. This is helpful as her daughter has behavior issues and requires a lot of care.    OBJECTIVE  /72 (BP Location: Right arm, Patient Position: Sitting, Cuff Size: Adult Regular)   Pulse 76   Temp 98.5  F (36.9  C) (Oral)   Resp 16   Wt 65 kg (143 lb 4.8 oz)   LMP 08/13/2024 (Exact Date)   SpO2 98%   BMI 24.60 kg/m       Her incisions are healed nicely.    From a personal perspective, she is here alone today.    IMPRESSION   Shayla is a 39 year-old female status post right thoracoscopic resection of a pericardial cyst. Final pathology is consistent with a benign process. She is here for post operative follow up.     PLAN  I spent 25 min on the date of the encounter in chart review,  patient visit, review of tests, documentation and/or discussion with other providers about the issues documented above. I reviewed the plan as follows:  Follow up with Thoracic Surgery as needed. Refill of methocarbamol provided.  All questions were answered and the patient and present family were in agreement with the plan.  I appreciate the opportunity to participate in the care of your patient and will keep you updated.  Sincerely,    CLAUDIA Ngo CNS

## 2024-09-09 ENCOUNTER — ONCOLOGY VISIT (OUTPATIENT)
Dept: SURGERY | Facility: CLINIC | Age: 39
End: 2024-09-09
Attending: THORACIC SURGERY (CARDIOTHORACIC VASCULAR SURGERY)
Payer: COMMERCIAL

## 2024-09-09 ENCOUNTER — ANCILLARY PROCEDURE (OUTPATIENT)
Dept: GENERAL RADIOLOGY | Facility: CLINIC | Age: 39
End: 2024-09-09
Attending: THORACIC SURGERY (CARDIOTHORACIC VASCULAR SURGERY)
Payer: COMMERCIAL

## 2024-09-09 VITALS
SYSTOLIC BLOOD PRESSURE: 115 MMHG | OXYGEN SATURATION: 98 % | BODY MASS INDEX: 24.6 KG/M2 | WEIGHT: 143.3 LBS | TEMPERATURE: 98.5 F | RESPIRATION RATE: 16 BRPM | HEART RATE: 76 BPM | DIASTOLIC BLOOD PRESSURE: 72 MMHG

## 2024-09-09 DIAGNOSIS — Q24.8 PERICARDIAL CYST: ICD-10-CM

## 2024-09-09 DIAGNOSIS — Q24.8 PERICARDIAL CYST ALONG RIGHT CARDIOPHRENIC ANGLE: ICD-10-CM

## 2024-09-09 PROCEDURE — 99213 OFFICE O/P EST LOW 20 MIN: CPT | Performed by: CLINICAL NURSE SPECIALIST

## 2024-09-09 PROCEDURE — 71046 X-RAY EXAM CHEST 2 VIEWS: CPT | Mod: GC | Performed by: RADIOLOGY

## 2024-09-09 PROCEDURE — 99024 POSTOP FOLLOW-UP VISIT: CPT | Performed by: CLINICAL NURSE SPECIALIST

## 2024-09-09 RX ORDER — METHOCARBAMOL 750 MG/1
750 TABLET, FILM COATED ORAL 4 TIMES DAILY
Qty: 30 TABLET | Refills: 0 | Status: SHIPPED | OUTPATIENT
Start: 2024-09-09 | End: 2024-09-26

## 2024-09-09 RX ORDER — METHOCARBAMOL 750 MG/1
750 TABLET, FILM COATED ORAL 4 TIMES DAILY
Qty: 30 TABLET | Refills: 0 | Status: SHIPPED | OUTPATIENT
Start: 2024-09-09 | End: 2024-09-09

## 2024-09-09 ASSESSMENT — PAIN SCALES - GENERAL: PAINLEVEL: MODERATE PAIN (4)

## 2024-09-09 NOTE — NURSING NOTE
"Oncology Rooming Note    September 9, 2024 4:11 PM   Shayla Mcclure is a 39 year old female who presents for:    Chief Complaint   Patient presents with    Oncology Clinic Visit     Pericardial cyst along right cardiophrenic angle     Initial Vitals: /72 (BP Location: Right arm, Patient Position: Sitting, Cuff Size: Adult Regular)   Pulse 76   Temp 98.5  F (36.9  C) (Oral)   Resp 16   Wt 65 kg (143 lb 4.8 oz)   LMP 08/13/2024 (Exact Date)   SpO2 98%   BMI 24.60 kg/m   Estimated body mass index is 24.6 kg/m  as calculated from the following:    Height as of 8/30/24: 1.626 m (5' 4\").    Weight as of this encounter: 65 kg (143 lb 4.8 oz). Body surface area is 1.71 meters squared.  Moderate Pain (4) Comment: Data Unavailable   Patient's last menstrual period was 08/13/2024 (exact date).  Allergies reviewed: Yes  Medications reviewed: Yes    Medications: Medication refills not needed today.  Pharmacy name entered into [facility]:    [id number] - [place], [place] - [address] AT SEC OF [facility] &  ( [facility])  [facility] [place], [place] - [address]  [facility] [place] [address] [facility] [place] [address] [facility] [place] [address] [id number] - [place], [place] - [address] [facility] [place] - [phone]  [id number] - [place], [place] [address] AT [address] OF [address] [facility] [place] - [place], [place] - [address], [id number]    Frailty Screening:   Is the patient here for a new oncology consult visit in cancer care? 2. No      Clinical concerns: Some pain at incision site.      Paulina Garcia, EMT  9/9/2024              "

## 2024-09-09 NOTE — LETTER
9/9/2024      Shayla Mcclure  [address] [address]      Dear Colleague,    Thank you for referring your patient, Shayla Mcclure, to the [facility]. Please see a copy of my visit note below.    THORACIC SURGERY FOLLOW UP VISIT      I saw Mrs. Mcclure in follow-up today. The clinical summary follows:     PREOP DIAGNOSIS   Pericardial cyst along the right cardiophrenic angle  PROCEDURE   Right thoracoscopic resection of pericardial cyst    DATE OF PROCEDURE  08/19/2024    HISTOPATHOLOGY   PERICARDIUM, PERICARDIAL CYST, EXCISION:  -Benign mesothelial multilocular cyst.  -One benign lymph node.    COMPLICATIONS  None    INTERVAL STUDIES  CXR 09/09/2024: Resolved tiny right pleural effusion. No focal airspace disease.         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 RIGHT thoracoscopic resection of pericardial cyst   Right 08/19/2024    Pericardial cyst along right cardiophrenic angle     C/SECTION, LOW TRANSVERSE      delivered at 27w3d for pre-eclampsia with severe features      COSMETIC MAMMOPLASTY AUGMENTATION BILATERAL      when 16 years old     DILATION AND CURETTAGE SUCTION N/A 04/16/2020    Procedure: DILATION AND CURETTAGE, UTERUS, USING SUCTION;  Surgeon: Haydee Mcpherson DO;  Location:  OR     DILATION AND CURETTAGE SUCTION  09/23/2021    repeat procedure for retained POC after prior suction D&C 09/20/2021     DILATION AND CURETTAGE SUCTION N/A 09/20/2021    Procedure: DILATION AND CURETTAGE, UTERUS, USING SUCTION;  Surgeon: Haydee Mcpherson DO;  Location: [facility] OR     MYOMECTOMY UTERUS  2012    open myomectomy in [place], reported very large fibroids     THORACOSCOPIC WEDGE RESECTION LUNG Right 8/19/2024    Procedure: RIGHT THORACOSCOPIC  RESECTION of pericardial cyst;  Surgeon: Gideon Huerta MD;  Location: [facility] OR      Social History     Socioeconomic History     Marital status:      Spouse name: Not on file     Number of children: Not on file     Years of education: Not on file     Highest education level: Not on file   Occupational History     Occupation: homemaker   Tobacco Use     Smoking status: Never     Passive exposure: Never     Smokeless tobacco: Never   Vaping Use     Vaping status: Never Used   Substance and Sexual Activity     Alcohol use: Yes     Comment: only birthdays and holidays     Drug use: No     Sexual activity: Yes     Partners: Male     Birth control/protection: None   Other Topics Concern     Parent/sibling w/ CABG, MI or angioplasty before 65F 55M? Not Asked   Social History Narrative    Patient and her  moved to [place] (from [place] via [place]) for their daughter with Scooter's Syndrome.     Very happy in [place].     Silvia Leyva     Social Determinants of Health     Financial Resource Strain: Patient Declined (4/12/2023)    Received from [facility]    Overall Financial Resource Strain (CARDIA)      Difficulty of Paying Living Expenses: Patient declined   Food Insecurity: No Food Insecurity (4/12/2023)    Received from [facility]    Hunger Vital Sign      Worried About Running Out of Food in the Last Year: Never true      Ran Out of Food in the Last Year: Never true   Transportation Needs: No Transportation Needs (4/12/2023)    Received from [facility]    PRAPARE - Transportation      Lack of Transportation (Medical): No      Lack of Transportation (Non-Medical): No   Physical Activity: Insufficiently Active (4/12/2023)    Received from [facility]    Exercise Vital Sign      Days of Exercise per Week: 2 days      Minutes of Exercise per Session: 10 min   Stress: No Stress Concern Present (4/12/2023)    Received from [facility]     [facility] of Occupational Health - Occupational Stress Questionnaire      Feeling of Stress : Only a little   Social Connections: Moderately Isolated (4/12/2023)    Received from [facility], [facility]    Social Connection and Isolation Panel [NHANES]      Frequency of Communication with Friends and Family: Once a week      Frequency of Social Gatherings with Friends and Family: Never      Attends Zoroastrianism Services: 1 to 4 times per year      Active Member of Clubs or Organizations: No      Attends Club or Organization Meetings: Never      Marital Status:    Interpersonal Safety: Not At Risk (4/12/2023)    Received from [facility]    Humiliation, Afraid, Rape, and Kick questionnaire      Fear of Current or Ex-Partner: No      Emotionally Abused: No      Physically Abused: No      Sexually Abused: No   Housing Stability: Unknown (4/12/2023)    Received from [facility], [facility]    Housing Stability Vital Sign      Unable to Pay for Housing in the Last Year: Patient refused      Number of Places Lived in the Last Year: 2      Unstable Housing in the Last Year: No      SUBJECTIVE   Shayla is doing ok. She is not using oxycodone for pain but the muscle relaxer does help. She needs a refill of this. She does note complete resolution of the symptoms she had prior to having the cyst removed. She does not get sharp pain in the middle of her chest anymore and she is able to breath easier now-especially with deep breaths. She no longer has a cough and she has more energy. This is helpful as her daughter has behavior issues and requires a lot of care.    OBJECTIVE  /72 (BP Location: Right arm, Patient Position: Sitting, Cuff Size: Adult Regular)   Pulse 76   Temp 98.5  F (36.9  C) (Oral)   Resp 16   Wt 65 kg (143 lb 4.8 oz)   LMP 08/13/2024 (Exact Date)   SpO2 98%   BMI 24.60 kg/m       Her incisions are healed nicely.    From a personal perspective, she is here alone  today.    NIA Mcguire is a 39 year-old female status post right thoracoscopic resection of a pericardial cyst. Final pathology is consistent with a benign process. She is here for post operative follow up.     PLAN  I spent 25 min on the date of the encounter in chart review, patient visit, review of tests, documentation and/or discussion with other providers about the issues documented above. I reviewed the plan as follows:  Follow up with Thoracic Surgery as needed. Refill of methocarbamol provided.  All questions were answered and the patient and present family were in agreement with the plan.  I appreciate the opportunity to participate in the care of your patient and will keep you updated.  Sincerely,    CLAUDIA Ngo       Again, thank you for allowing me to participate in the care of your patient.        Sincerely,        CLAUDIA Ngo CNS

## 2024-09-26 ENCOUNTER — OFFICE VISIT (OUTPATIENT)
Dept: FAMILY MEDICINE | Facility: CLINIC | Age: 39
End: 2024-09-26
Payer: COMMERCIAL

## 2024-09-26 VITALS
WEIGHT: 144.25 LBS | BODY MASS INDEX: 24.63 KG/M2 | DIASTOLIC BLOOD PRESSURE: 64 MMHG | SYSTOLIC BLOOD PRESSURE: 110 MMHG | HEART RATE: 84 BPM | HEIGHT: 64 IN | OXYGEN SATURATION: 100 % | RESPIRATION RATE: 16 BRPM | TEMPERATURE: 97.7 F

## 2024-09-26 DIAGNOSIS — Z83.49 FAMILY HISTORY OF THYROID DISEASE: ICD-10-CM

## 2024-09-26 DIAGNOSIS — Z00.00 ROUTINE GENERAL MEDICAL EXAMINATION AT A HEALTH CARE FACILITY: Primary | ICD-10-CM

## 2024-09-26 DIAGNOSIS — F41.9 ANXIETY: ICD-10-CM

## 2024-09-26 DIAGNOSIS — Q24.8 PERICARDIAL CYST: ICD-10-CM

## 2024-09-26 LAB — TSH SERPL DL<=0.005 MIU/L-ACNC: 1.19 UIU/ML (ref 0.3–4.2)

## 2024-09-26 PROCEDURE — 99396 PREV VISIT EST AGE 40-64: CPT | Mod: 25 | Performed by: PHYSICIAN ASSISTANT

## 2024-09-26 PROCEDURE — 36415 COLL VENOUS BLD VENIPUNCTURE: CPT | Performed by: PHYSICIAN ASSISTANT

## 2024-09-26 PROCEDURE — 84443 ASSAY THYROID STIM HORMONE: CPT | Performed by: PHYSICIAN ASSISTANT

## 2024-09-26 PROCEDURE — 86376 MICROSOMAL ANTIBODY EACH: CPT | Performed by: PHYSICIAN ASSISTANT

## 2024-09-26 PROCEDURE — 90471 IMMUNIZATION ADMIN: CPT | Performed by: PHYSICIAN ASSISTANT

## 2024-09-26 PROCEDURE — 90656 IIV3 VACC NO PRSV 0.5 ML IM: CPT | Performed by: PHYSICIAN ASSISTANT

## 2024-09-26 PROCEDURE — 99213 OFFICE O/P EST LOW 20 MIN: CPT | Mod: 25 | Performed by: PHYSICIAN ASSISTANT

## 2024-09-26 RX ORDER — BUPROPION HYDROCHLORIDE 150 MG/1
150 TABLET ORAL EVERY MORNING
Qty: 90 TABLET | Refills: 1 | Status: SHIPPED | OUTPATIENT
Start: 2024-09-26

## 2024-09-26 NOTE — PROGRESS NOTES
Preventive Care Visit  [facility]  Margaret Aragon PA-C, Family Medicine  Sep 26, 2024        Hay Mcguire is a 39 year old, presenting for the following:  Physical        9/26/2024    10:03 AM   Additional Questions   Roomed by Jennifer PALM MA        Health Care Directive  Patient does not have a Health Care Directive or Living Will: Discussed advance care planning with patient; information given to patient to review.    HPI    Recent surgery for a pericardial cyst. Overall this went well. She still has some deep pain in the ribs but breathing and heart palpitations have completely resolved.     Daughter with Hurler syndrome has had more of a decline. Now going into periods of psychosis and this has been very hard to manage. They have a 17 month old son as well whom has been doing well. This just adds a lot of extra stress.     She reports her weight is the heaviest she has ever been. Stress is a big part of this. Sometimes feels more snacky in the evening.         9/26/2024   General Health   How would you rate your overall physical health? Good   Feel stress (tense, anxious, or unable to sleep) Only a little      (!) STRESS CONCERN      9/26/2024   Nutrition   Diet: Regular (no restrictions)            9/26/2024   Exercise   Days per week of moderate/strenous exercise 2 days   Average minutes spent exercising at this level 10 min      (!) EXERCISE CONCERN      9/26/2024   Social Factors   Frequency of gathering with friends or relatives Never   Worry food won't last until get money to buy more No   Food not last or not have enough money for food? No   Do you have housing? (Housing is defined as stable permanent housing and does not include staying ouside in a car, in a tent, in an abandoned building, in an overnight shelter, or couch-surfing.) Yes   Are you worried about losing your housing? No   Lack of transportation? No   Unable to get utilities (heat,electricity)? No       (!) SOCIAL CONNECTIONS CONCERN      9/26/2024   Fall Risk   Fallen 2 or more times in the past year? No   Trouble with walking or balance? No             9/26/2024   Dental   Dentist two times every year? (!) NO            9/26/2024   TB Screening   Were you born outside of the [place]? Yes            Today's PHQ-2 Score:       9/26/2024     9:31 AM   PHQ-2 ( 1999 [employer])   Q1: Little interest or pleasure in doing things 0   Q2: Feeling down, depressed or hopeless 0   PHQ-2 Score 0   Q1: Little interest or pleasure in doing things Not at all   Q2: Feeling down, depressed or hopeless Not at all   PHQ-2 Score 0           9/26/2024   Substance Use   Alcohol more than 3/day or more than 7/wk No   Do you use any other substances recreationally? No        Social History     Tobacco Use    Smoking status: Never     Passive exposure: Never    Smokeless tobacco: Never   Vaping Use    Vaping status: Never Used   Substance Use Topics    Alcohol use: Yes     Comment: Occasionally    Drug use: No           11/15/2023   LAST FHS-7 RESULTS   1st degree relative breast or ovarian cancer No   Any relative bilateral breast cancer No   Any male have breast cancer No   Any ONE woman have BOTH breast AND ovarian cancer No   Any woman with breast cancer before 50yrs No   2 or more relatives with breast AND/OR ovarian cancer No   2 or more relatives with breast AND/OR bowel cancer No           Mammogram Screening - Mammogram every 1-2 years updated in Health Maintenance based on mutual decision making      History of abnormal Pap smear: No - age 30- 64 PAP with HPV every 5 years recommended        Latest Ref Rng & Units 11/15/2023     8:31 AM 9/28/2018     2:59 PM 9/28/2018     2:30 PM   PAP / HPV   PAP  Negative for Intraepithelial Lesion or Malignancy (NILM)      PAP (Historical)   NIL     HPV 16 DNA Negative Negative   Negative    HPV 18 DNA Negative Negative   Negative    Other HR HPV Negative Negative   Negative      ASCVD Risk    The 10-year ASCVD risk score (Camille NEAL, et al., 2019) is: 0.3%    Values used to calculate the score:      Age: 39 years      Sex: Female      Is Non- : No      Diabetic: No      Tobacco smoker: No      Systolic Blood Pressure: 110 mmHg      Is BP treated: No      HDL Cholesterol: 67 mg/dL      Total Cholesterol: 190 mg/dL        9/26/2024   Contraception/Family Planning   Questions about contraception or family planning No           Reviewed and updated as needed this visit by Provider                    BP Readings from Last 3 Encounters:   09/26/24 110/64   09/09/24 115/72   08/30/24 116/75    Wt Readings from Last 3 Encounters:   09/26/24 65.4 kg (144 lb 4 oz)   09/09/24 65 kg (143 lb 4.8 oz)   08/30/24 67 kg (147 lb 12.8 oz)                  Patient Active Problem List   Diagnosis    Intramural leiomyoma of uterus    Pericardial cyst     Past Surgical History:   Procedure Laterality Date    1. RIGHT thoracoscopic resection of pericardial cyst   Right 08/19/2024    Pericardial cyst along right cardiophrenic angle    ABDOMEN SURGERY  2012/2015/2023    C-sections    BREAST SURGERY  2001    Breast implants    C/SECTION, LOW TRANSVERSE      delivered at 27w3d for pre-eclampsia with severe features     COSMETIC MAMMOPLASTY AUGMENTATION BILATERAL      when 16 years old    DILATION AND CURETTAGE SUCTION N/A 04/16/2020    Procedure: DILATION AND CURETTAGE, UTERUS, USING SUCTION;  Surgeon: Haydee Mcpherson DO;  Location:  OR    DILATION AND CURETTAGE SUCTION  09/23/2021    repeat procedure for retained POC after prior suction D&C 09/20/2021    DILATION AND CURETTAGE SUCTION N/A 09/20/2021    Procedure: DILATION AND CURETTAGE, UTERUS, USING SUCTION;  Surgeon: Haydee Mcpherson DO;  Location:  OR    ENT SURGERY  1985    [place]    MYOMECTOMY UTERUS  2012    open myomectomy in [place], reported very large fibroids    THORACOSCOPIC WEDGE RESECTION LUNG Right 08/19/2024    Procedure:  "RIGHT THORACOSCOPIC RESECTION of pericardial cyst;  Surgeon: Gideon Huerta MD;  Location:  OR       Social History     Tobacco Use    Smoking status: Never     Passive exposure: Never    Smokeless tobacco: Never   Substance Use Topics    Alcohol use: Yes     Comment: Occasionally     Family History   Problem Relation Age of Onset    Other - See Comments Mother         heavy menstrual cycles    Hashimoto's thyroiditis Mother     Thyroid Disease Mother         Hashimoto    Lupus Father     Leukemia Maternal Grandmother     Other Cancer Maternal Grandmother         Leukemia    Lupus Maternal Aunt     Mental Illness Daughter         Psychosis, ocd anxiety    Genetic Disorder Daughter         HURLER Syndrome    Cancer No family hx of         Scooter's Syndrome    Anesthesia Reaction No family hx of     Venous thrombosis No family hx of          Current Outpatient Medications   Medication Sig Dispense Refill    acetaminophen (TYLENOL) 325 MG tablet Take 3 tablets (975 mg) by mouth every 8 hours 120 tablet 0    buPROPion (WELLBUTRIN XL) 150 MG 24 hr tablet Take 1 tablet (150 mg) by mouth every morning. 90 tablet 1         Review of Systems  Constitutional, HEENT, cardiovascular, pulmonary, GI, , musculoskeletal, neuro, skin, endocrine and psych systems are negative, except as otherwise noted.     Objective    Exam  /64   Pulse 84   Temp 97.7  F (36.5  C) (Temporal)   Resp 16   Ht 1.626 m (5' 4\")   Wt 65.4 kg (144 lb 4 oz)   LMP 08/13/2024 (Exact Date)   SpO2 100%   BMI 24.76 kg/m     Estimated body mass index is 24.76 kg/m  as calculated from the following:    Height as of this encounter: 1.626 m (5' 4\").    Weight as of this encounter: 65.4 kg (144 lb 4 oz).    Physical Exam  GENERAL: alert and no distress  EYES: Eyes grossly normal to inspection, PERRL and conjunctivae and sclerae normal  HENT: ear canals and TM's normal, nose and mouth without ulcers or lesions  NECK: no adenopathy, no " asymmetry, masses, or scars  RESP: lungs clear to auscultation - no rales, rhonchi or wheezes  CV: regular rate and rhythm, normal S1 S2, no S3 or S4, no murmur, click or rub, no peripheral edema  ABDOMEN: soft, nontender, no hepatosplenomegaly, no masses and bowel sounds normal  MS: no gross musculoskeletal defects noted, no edema  SKIN: no suspicious lesions or rashes  NEURO: Normal strength and tone, mentation intact and speech normal  PSYCH: mentation appears normal, affect normal/bright      Assessment & Plan     Routine general medical examination at a health care facility    Pericardial cyst  S/P resection. Overall has been doing really well.     Family history of thyroid disease  Labs updated today given family history.   - TSH with free T4 reflex; Future  - Thyroid peroxidase antibody; Future  - TSH with free T4 reflex  - Thyroid peroxidase antibody    Anxiety  Anxiety has been higher due to stressors with decline in her daughter's health. She also is frustrated with thew weight gain she has experienced. Recommended starting Wellbutrin as below.  Appropriate use, side effects and dose titration if needed discussed.  - buPROPion (WELLBUTRIN XL) 150 MG 24 hr tablet; Take 1 tablet (150 mg) by mouth every morning.    Patient has been advised of split billing requirements and indicates understanding: Yes        Counseling  Appropriate preventive services were addressed with this patient via screening, questionnaire, or discussion as appropriate for fall prevention, nutrition, physical activity, Tobacco-use cessation, social engagement, weight loss and cognition.  Checklist reviewing preventive services available has been given to the patient.  Reviewed patient's diet, addressing concerns and/or questions.   She is at risk for lack of exercise and has been provided with information to increase physical activity for the benefit of her well-being.   Patient is at risk for social isolation and has been provided with  information about the benefit of social connection.   The patient was instructed to see the dentist every 6 months.   She is at risk for psychosocial distress and has been provided with information to reduce risk.   Information on urinary incontinence and treatment options given to patient.       Signed Electronically by: Margaret Aragon PA-C

## 2024-09-27 LAB — THYROPEROXIDASE AB SERPL-ACNC: <10 IU/ML

## 2024-11-01 ENCOUNTER — MYC MEDICAL ADVICE (OUTPATIENT)
Dept: FAMILY MEDICINE | Facility: CLINIC | Age: 39
End: 2024-11-01
Payer: COMMERCIAL

## 2024-11-01 ENCOUNTER — NURSE TRIAGE (OUTPATIENT)
Dept: FAMILY MEDICINE | Facility: CLINIC | Age: 39
End: 2024-11-01
Payer: COMMERCIAL

## 2024-11-01 DIAGNOSIS — F41.9 ANXIETY: Primary | ICD-10-CM

## 2024-11-01 NOTE — TELEPHONE ENCOUNTER
S-(situation): Phoned patient back to triage per provider request.  Patient stated her My Chart message symptoms only lasted a few minutes and they have not returned.  This RN attempted to triage patient.  During questions for RN triage protocol, patient stated she just wanted to know if she could decrease her Wellbutrin back down to 150 mg daily.  She stated she is absolutely certain that she did not have an anxiety or panic attack and she is absolutely certain it is not her heart.  Patient stated she just wanted to know if PCP thought it might have been from the increase of Wellbutrin and to ask to decrease back to 150 mg daily.    B-(background): Patient stated she did not take her prescription for Wellbutrin today.    A-(assessment): Unknown episode of dizziness, could be from medication increase, could be from anxiety, etc.    R-(recommendations): Per RN protocol, Home care advise given.  Patient stated she only wanted PCP advice on the possibility of this episode occurring due to increase in dose of Wellbutrin and she just wants to decrease her dose to 150 mg as she was previously taking.    Will forward to PCP for review.     Margaret Aragon PA-C AN    11/1/24 11:12 AM  Note  Please triage.     Margaret Aragon PA-C             11/1/24 10:49 AM  Hi Dr. Aragon hope you are having a Good Friday. So yesterday I was just standing washing some parts of Latonia s gtube and all of a sudden I started feeling very dizzy and then right after I felt my heart was pounding so hard, I know it sounds weird but if it felt like if it was going to get out of my chest. So I looked at my heart rate and it was 160 and quickly started going up until it got to 186. It s weird not even when I had the pericardial cyst got this high. Thankfully my  had not left for work so he sat me down and was about to call an ambulance when out of the blue once I sat down started going down slowly.      I had not gone running or any physical  activity I was just washing [employer] s [employer] parts. The rest of the night I was fine.      I wanted to ask you if this could be a side effect from the medicine? Since I m taking the 300mg the only thing I have felt different is I get a tiny dizzy right after I take it and then I the afternoon I start feeling super sleepy.         Thank you!   Additional Information   Negative: Passed out (i.e., lost consciousness, collapsed and was not responding)   Negative: Shock suspected (e.g., cold/pale/clammy skin, too weak to stand, low BP, rapid pulse)   Negative: Difficult to awaken or acting confused (e.g., disoriented, slurred speech)   Negative: Visible sweat on face or sweat dripping down face   Negative: Unable to walk, or can only walk with assistance (e.g., requires support)   Negative: Received SHOCK from implantable cardiac defibrillator and has persisting symptoms (i.e., palpitations, lightheadedness)   Negative: Dizziness, lightheadedness, or weakness and heart beating very rapidly (e.g., > 140 / minute)   Negative: Dizziness, lightheadedness, or weakness and heart beating very slowly (e.g., < 50 / minute)   Negative: Sounds like a life-threatening emergency to the triager   Negative: Chest pain   Negative: Difficulty breathing   Negative: Dizziness, lightheadedness, or weakness   Negative: Heart beating very rapidly (e.g., > 140 / minute) and present now  (Exception: During exercise.)   Negative: Heart beating very slowly (e.g., < 50 / minute)  (Exception: Athlete and heart rate normal for caller.)   Negative: New or worsened shortness of breath with activity (dyspnea on exertion)   Negative: Patient sounds very sick or weak to the triager   Negative: Wearing a 'Holter monitor' or 'cardiac event monitor'   Negative: Received SHOCK from implantable cardiac defibrillator (and now feels well)   Negative: Heart beating very rapidly (e.g., > 140 / minute) and not present now  (Exception: During exercise.)   Negative:  "Skipped or extra beat(s) and increases with exercise or exertion   Negative: Skipped or extra beat(s) and occurs 4 or more times per minute   Negative: History of heart disease (i.e., heart attack, bypass surgery, angina, angioplasty)  (Exception: Brief heartbeat symptoms that went away and now feels well.)   Negative: Age > 60 years  (Exception: Brief heartbeat symptoms that went away and now feels well.)   Negative: Taking water pill (i.e., diuretic) or heart medication (e.g., digoxin)   Negative: Patient wants to be seen   Negative: Heart rhythm alert (e.g., 'you have irregular heartbeat') from personal wearable device (e.g., Apple Watch)   Negative: History of hyperthyroidism or taking thyroid medication   Negative: Substance use (drug use) or misuse, known or suspected   Negative: ADHD and taking stimulant medication   Negative: Palpitations and no improvement after following Care Advice   Negative: Problems with anxiety or stress   Negative: Palpitations are a chronic symptom (recurrent or ongoing AND present > 4 weeks)   Negative: Heart beating very slowly (e.g., < 50 / minute) in well-conditioned athlete and that caller says is normal   Negative: Palpitations   Negative: Skipped or extra beat(s) and occurs < 4 times / minute    Answer Assessment - Initial Assessment Questions  1. DESCRIPTION: \"Please describe your heart rate or heartbeat that you are having\" (e.g., fast/slow, regular/irregular, skipped or extra beats, \"palpitations\")      175 HR, felt like heart was going to jump out of chest.    2. ONSET: \"When did it start?\" (Minutes, hours or days)       It happened one time yesterday    3. DURATION: \"How long does it last\" (e.g., seconds, minutes, hours)      Minutes    4. PATTERN \"Does it come and go, or has it been constant since it started?\"  \"Does it get worse with exertion?\"   \"Are you feeling it now?\"      Just the one time    5. TAP: \"Using your hand, can you tap out what you are feeling on a " "chair or table in front of you, so that I can hear?\" (Note: not all patients can do this)        Not happening at this time    6. HEART RATE: \"Can you tell me your heart rate?\" \"How many beats in 15 seconds?\"  (Note: not all patients can do this)        140-175    7. RECURRENT SYMPTOM: \"Have you ever had this before?\" If Yes, ask: \"When was the last time?\" and \"What happened that time?\"       No    8. CAUSE: \"What do you think is causing the palpitations?\"      Unknown, ,maybe increase of medication    9. CARDIAC HISTORY: \"Do you have any history of heart disease?\" (e.g., heart attack, angina, bypass surgery, angioplasty, arrhythmia)       Yes    10. OTHER SYMPTOMS: \"Do you have any other symptoms?\" (e.g., dizziness, chest pain, sweating, difficulty breathing)       Lightheaded yesterday during episode    11. PREGNANCY: \"Is there any chance you are pregnant?\" \"When was your last menstrual period?\"        Unknown    Protocols used: Heart Rate and Heartbeat [id number]  Sondra Deshpande RN    "

## 2024-11-04 RX ORDER — BUPROPION HYDROCHLORIDE 150 MG/1
150 TABLET, EXTENDED RELEASE ORAL 2 TIMES DAILY
Qty: 60 TABLET | Refills: 0 | Status: SHIPPED | OUTPATIENT
Start: 2024-11-04

## 2024-11-16 ENCOUNTER — MYC MEDICAL ADVICE (OUTPATIENT)
Dept: DERMATOLOGY | Facility: CLINIC | Age: 39
End: 2024-11-16
Payer: COMMERCIAL

## 2024-11-18 DIAGNOSIS — L70.0 ACNE VULGARIS: ICD-10-CM

## 2024-11-18 RX ORDER — TRETINOIN 0.25 MG/G
CREAM TOPICAL
Qty: 45 G | Refills: 11 | Status: SHIPPED | OUTPATIENT
Start: 2024-11-18

## 2024-12-21 ENCOUNTER — MEDICAL CORRESPONDENCE (OUTPATIENT)
Dept: HEALTH INFORMATION MANAGEMENT | Facility: CLINIC | Age: 39
End: 2024-12-21
Payer: COMMERCIAL

## 2025-01-02 ENCOUNTER — MYC MEDICAL ADVICE (OUTPATIENT)
Dept: FAMILY MEDICINE | Facility: CLINIC | Age: 40
End: 2025-01-02
Payer: COMMERCIAL

## 2025-01-02 DIAGNOSIS — F41.9 ANXIETY: ICD-10-CM

## 2025-01-02 RX ORDER — BUPROPION HYDROCHLORIDE 150 MG/1
150 TABLET, EXTENDED RELEASE ORAL 2 TIMES DAILY
Qty: 60 TABLET | Refills: 0 | Status: SHIPPED | OUTPATIENT
Start: 2025-01-02

## 2025-01-13 ENCOUNTER — HOSPITAL ENCOUNTER (OUTPATIENT)
Dept: MRI IMAGING | Facility: CLINIC | Age: 40
Discharge: HOME | End: 2025-01-13
Attending: STUDENT IN AN ORGANIZED HEALTH CARE EDUCATION/TRAINING PROGRAM | Admitting: STUDENT IN AN ORGANIZED HEALTH CARE EDUCATION/TRAINING PROGRAM
Payer: COMMERCIAL

## 2025-01-13 DIAGNOSIS — M25.561 KNEE PAIN, RIGHT: ICD-10-CM

## 2025-01-13 PROCEDURE — 73721 MRI JNT OF LWR EXTRE W/O DYE: CPT | Mod: RT

## 2025-01-13 PROCEDURE — 73721 MRI JNT OF LWR EXTRE W/O DYE: CPT | Mod: 26 | Performed by: RADIOLOGY

## 2025-01-18 ENCOUNTER — MYC MEDICAL ADVICE (OUTPATIENT)
Dept: ORTHOPEDICS | Facility: CLINIC | Age: 40
End: 2025-01-18
Payer: COMMERCIAL

## 2025-03-03 ENCOUNTER — OFFICE VISIT (OUTPATIENT)
Dept: URGENT CARE | Facility: URGENT CARE | Age: 40
End: 2025-03-03
Payer: COMMERCIAL

## 2025-03-03 ENCOUNTER — ANCILLARY PROCEDURE (OUTPATIENT)
Dept: GENERAL RADIOLOGY | Facility: CLINIC | Age: 40
End: 2025-03-03
Attending: FAMILY MEDICINE
Payer: COMMERCIAL

## 2025-03-03 VITALS
WEIGHT: 148 LBS | BODY MASS INDEX: 25.4 KG/M2 | RESPIRATION RATE: 16 BRPM | OXYGEN SATURATION: 100 % | SYSTOLIC BLOOD PRESSURE: 138 MMHG | TEMPERATURE: 97.9 F | HEART RATE: 93 BPM | DIASTOLIC BLOOD PRESSURE: 81 MMHG

## 2025-03-03 DIAGNOSIS — R42 DIZZINESS: ICD-10-CM

## 2025-03-03 DIAGNOSIS — R00.2 PALPITATIONS: ICD-10-CM

## 2025-03-03 DIAGNOSIS — R42 LIGHTHEADEDNESS: Primary | ICD-10-CM

## 2025-03-03 LAB
ALBUMIN SERPL-MCNC: 3.7 G/DL (ref 3.4–5)
ALP SERPL-CCNC: 66 U/L (ref 40–150)
ALT SERPL W P-5'-P-CCNC: 35 U/L (ref 0–50)
ANION GAP SERPL CALCULATED.3IONS-SCNC: 9 MMOL/L (ref 3–14)
AST SERPL W P-5'-P-CCNC: 25 U/L (ref 0–45)
BILIRUB SERPL-MCNC: 0.6 MG/DL (ref 0.2–1.3)
BUN SERPL-MCNC: 12 MG/DL (ref 7–30)
CALCIUM SERPL-MCNC: 9.6 MG/DL (ref 8.5–10.1)
CHLORIDE BLD-SCNC: 105 MMOL/L (ref 94–109)
CO2 SERPL-SCNC: 28 MMOL/L (ref 20–32)
CREAT SERPL-MCNC: 0.6 MG/DL (ref 0.52–1.04)
EGFRCR SERPLBLD CKD-EPI 2021: >90 ML/MIN/1.73M2
ERYTHROCYTE [DISTWIDTH] IN BLOOD BY AUTOMATED COUNT: 13 % (ref 10–15)
FLUAV AG SPEC QL IA: NEGATIVE
FLUBV AG SPEC QL IA: NEGATIVE
GLUCOSE BLD-MCNC: 99 MG/DL (ref 70–99)
HCT VFR BLD AUTO: 40.7 % (ref 35–47)
HGB BLD-MCNC: 13.6 G/DL (ref 11.7–15.7)
MCH RBC QN AUTO: 28.6 PG (ref 26.5–33)
MCHC RBC AUTO-ENTMCNC: 33.4 G/DL (ref 31.5–36.5)
MCV RBC AUTO: 86 FL (ref 78–100)
PLATELET # BLD AUTO: 359 10E3/UL (ref 150–450)
POTASSIUM BLD-SCNC: 3.8 MMOL/L (ref 3.4–5.3)
PROT SERPL-MCNC: 7.2 G/DL (ref 6.8–8.8)
RBC # BLD AUTO: 4.75 10E6/UL (ref 3.8–5.2)
SODIUM SERPL-SCNC: 142 MMOL/L (ref 135–145)
TROPONIN T SERPL HS-MCNC: <6 NG/L
WBC # BLD AUTO: 7.6 10E3/UL (ref 4–11)

## 2025-03-03 PROCEDURE — 36415 COLL VENOUS BLD VENIPUNCTURE: CPT | Performed by: FAMILY MEDICINE

## 2025-03-03 PROCEDURE — 87635 SARS-COV-2 COVID-19 AMP PRB: CPT | Performed by: FAMILY MEDICINE

## 2025-03-03 PROCEDURE — 3079F DIAST BP 80-89 MM HG: CPT | Performed by: FAMILY MEDICINE

## 2025-03-03 PROCEDURE — 85027 COMPLETE CBC AUTOMATED: CPT | Performed by: FAMILY MEDICINE

## 2025-03-03 PROCEDURE — 93000 ELECTROCARDIOGRAM COMPLETE: CPT | Performed by: FAMILY MEDICINE

## 2025-03-03 PROCEDURE — 3075F SYST BP GE 130 - 139MM HG: CPT | Performed by: FAMILY MEDICINE

## 2025-03-03 PROCEDURE — 80053 COMPREHEN METABOLIC PANEL: CPT | Performed by: FAMILY MEDICINE

## 2025-03-03 PROCEDURE — 99214 OFFICE O/P EST MOD 30 MIN: CPT | Performed by: FAMILY MEDICINE

## 2025-03-03 PROCEDURE — 84484 ASSAY OF TROPONIN QUANT: CPT | Performed by: FAMILY MEDICINE

## 2025-03-03 PROCEDURE — 87804 INFLUENZA ASSAY W/OPTIC: CPT | Performed by: FAMILY MEDICINE

## 2025-03-03 PROCEDURE — 71046 X-RAY EXAM CHEST 2 VIEWS: CPT | Mod: TC | Performed by: RADIOLOGY

## 2025-03-03 NOTE — PROGRESS NOTES
Assessment & Plan       ICD-10-CM    1. Lightheadedness  R42 CBC with platelets     Comprehensive metabolic panel (BMP + Alb, Alk Phos, ALT, AST, Total. Bili, TP)     EKG 12-lead complete w/read - Clinics     XR Chest 2 Views     CBC with platelets     Comprehensive metabolic panel (BMP + Alb, Alk Phos, ALT, AST, Total. Bili, TP)     Troponin T, High Sensitivity     Troponin T, High Sensitivity     Troponin T, High Sensitivity     Influenza A & B Antigen - Clinic Collect     COVID-19 Virus (Coronavirus) by PCR Nose      2. Palpitations  R00.2 CBC with platelets     Comprehensive metabolic panel (BMP + Alb, Alk Phos, ALT, AST, Total. Bili, TP)     EKG 12-lead complete w/read - Clinics     XR Chest 2 Views     CBC with platelets     Comprehensive metabolic panel (BMP + Alb, Alk Phos, ALT, AST, Total. Bili, TP)     Troponin T, High Sensitivity     Troponin T, High Sensitivity     Troponin T, High Sensitivity     Influenza A & B Antigen - Clinic Collect     COVID-19 Virus (Coronavirus) by PCR Nose         Intermittent palpitations and lightheadedness have occurred over the past few days, with heart rate fluctuations up to 140 bpm. There is no history of heart issues other than a previously removed pericardial cyst. Physical exam and initial tests, including EKG, CBC, and chest x-ray, are normal. The differential diagnosis includes cardiac arrhythmia, dehydration, infection, or other cardiac issues. Advised her that further testing is needed to rule out cardiac abnormality, and other potential causes. The limitations of urgent care testing and the need for further evaluation at an Acute Diagnostic Center or ED if symptoms persist were discussed. Her concern about managing care for her complex medical child and 's work schedule influences the decision to pursue further testing. I agreed to draw a troponin at her request based on concern for prior issue with cyst and link to elevated troponin in the past. This came  back normal, which is reassuring. Her CXR also shows no recurrence of previous cardiac cyst or any other acute pathology to explain her symptoms. .  I did not reproduce her tachycardia here, but she may need event recorder if her symptoms persist.  Also opted to screen for acute infectious illness despite not having some of the typical symptoms such as fever or cough. Influenza was negative, covid pending. I'm reassured by her normal CMP, which rules out significant electrolyte disturbance, dehydration/acute kidney failure, severe hypo or hyperglycemia.  Prior liver enzyme abnormality has resolved.    AndPericardial Cyst (Post-Removal)    A pericardial cyst was removed in August 2024. No recurrence is noted on the current chest x-ray. There is no fluid buildup or cardiomegaly observed. Monitor for recurrence or new symptoms and reassure of normal findings.    Follow-up    Call with test results. Schedule a follow-up appointment if needed based on results. Consider referral to the Acute Diagnostic Center for further evaluation if symptoms persist or worsen.    Will treat conservatively for now but I have a very low threshold for encouraging her to go to the emergency room for further evaluation with persistent symptoms.  In the meantime rest, push fluids especially electrolytes, high-protein diet, and monitor.    See patient instructions:    Patient Instructions   Shayla, I'm happy to see that your tests today are normal, but I'm still concerned about your symptoms.   If things get worse at all then I want you to go to the emergency department for further testing.      They can do more heart testing and more blood work than what we can do here in the urgent care clinic.    Normal troponin is reassuring.  But you may need a longer period of cardiac monitoring to find out why your heartbeat keeps going fast.    In the meantime please make sure you are getting plenty of rest, drinking fluids with electrolytes and getting  "good protein in your diet to help maintain good energy levels.    We will notify you when the COVID results come in.      Hope Flores MD  [facility] [place]    Hay Mcguire is a 39 year old female who presents to clinic today for the following health issues:  Chief Complaint   Patient presents with    Palpitations     Palpitations, tachycardia, lightheaded, tunnel vision x3 days     HPI    See notes above.   Friday when she awoke she felt off, was having palpitations. Had a pericardial cyst last year, but otherwise hasn't had palpitations in a while.   Saturday at an event she felt very lightheaded. Had some coffee, was having more palpitations, watch couldn't track heart rate.    Yesterday more palpitations up to the 140s, intermittent.   Felt faint, \"off\". When falling asleep last night her heart woke her up. Heart rate was fine through the night per her watch.   This am just sitting still, her heart jumped up to 140s again.     She has been experiencing dizziness and palpitations since Friday. She woke up feeling unusually tired despite adequate sleep and noted lightheadedness and dizziness, which then was more noticeable during an event on Saturday. She describes the sensation as feeling distant from conversations. Palpitations began Friday afternoon, which she had not experienced since her pericardial cyst removal last year. These palpitations were intermittent and rapid, occurring throughout the weekend. On Sunday, her heart rate was recorded at 140 bpm while sitting, accompanied by dizziness and a near-fainting sensation. Palpitations also disrupted her sleep. On Monday morning, her heart rate spiked to 141 bpm for about two minutes while sitting. She continues to feel lightheaded and 'kind of weird.'    She denies recent changes in diet or fluid intake, maintains her usual morning coffee routine, and denies cold symptoms, nausea, vomiting, or cough. She is " currently menstruating but does not typically experience these symptoms during her period and her bleeding is not heavy.     Her past medical history includes a pericardial cyst that was surgically removed in August of the previous year. She denies any history of heart or lung issues, thyroid disorders, or recent alcohol use during the period of dizziness.    She stopped taking Wellbutrin three months ago and is currently using Retin-A cream. She has an allergy to clindamycin.    She does not use tobacco or drugs and consumes alcohol occasionally in social settings.    7 pt ROS is otherwise negative except as noted in HPI.      Objective    /81   Pulse 93   Temp 97.9  F (36.6  C) (Oral)   Resp 16   Wt 67.1 kg (148 lb)   LMP 02/25/2025 (Exact Date)   SpO2 100%   BMI 25.40 kg/m    Physical Exam   Vitals noted.  Patient alert, oriented, and in no acute distress. But she looks fatigue and concerned  Ears:  Canals clear, TM's nl bilaterally.  No erythema or fluid.   Eyes:  bright without discharge or injection. PER and EOMI.    Oral:  Oropharynx nl without erythema, exudate, mass or other lesions. Bilaterally symmetric large noninflamed tonsils.   Neck:  Supple without lymphadenopathy, JVD or masses.   No bruits.   CV:  RRR without murmur. Pulse low 90s. I re-evaulated her pulse several times during exam with consistent findings of regular normal rate and rhythm.   Respiratory:  Lungs clear to auscultation bilaterally.     EKG shows normal sinus rhythm without ectopy or ischemia.   CXR taken today and independently reviewed by me and shows:   No infiltrate or mass, resolution of previously seen right cardiac mass, no effusion or other acute pathology.      Orders Placed This Encounter   Procedures    XR Chest 2 Views    CBC with platelets    Comprehensive metabolic panel (BMP + Alb, Alk Phos, ALT, AST, Total. Bili, TP)    Troponin T, High Sensitivity    Troponin T, High Sensitivity    COVID-19 Virus  (Coronavirus) by PCR Nose    EKG 12-lead complete w/read - Clinics      Results for orders placed or performed in visit on 03/03/25   XR Chest 2 Views     Status: None    Narrative    EXAM: XR CHEST 2 VIEWS  LOCATION: [facility] [place]  DATE: 3/3/2025    INDICATION: lightheaded with tachycardia, history of pericardial cyst 7 24, excised 8 24, check for recurrence  COMPARISON: Chest radiograph 9/9/2024      Impression    IMPRESSION: Negative chest.   Results for orders placed or performed in visit on 03/03/25   CBC with platelets     Status: Normal   Result Value Ref Range    WBC Count 7.6 4.0 - 11.0 10e3/uL    RBC Count 4.75 3.80 - 5.20 10e6/uL    Hemoglobin 13.6 11.7 - 15.7 g/dL    Hematocrit 40.7 35.0 - 47.0 %    MCV 86 78 - 100 fL    MCH 28.6 26.5 - 33.0 pg    MCHC 33.4 31.5 - 36.5 g/dL    RDW 13.0 10.0 - 15.0 %    Platelet Count 359 150 - 450 10e3/uL   Comprehensive metabolic panel (BMP + Alb, Alk Phos, ALT, AST, Total. Bili, TP)     Status: Normal   Result Value Ref Range    Sodium 142 135 - 145 mmol/L    Potassium 3.8 3.4 - 5.3 mmol/L    Chloride 105 94 - 109 mmol/L    Carbon Dioxide (CO2) 28 20 - 32 mmol/L    Anion Gap 9 3 - 14 mmol/L    Urea Nitrogen 12 7 - 30 mg/dL    Creatinine 0.60 0.52 - 1.04 mg/dL    GFR Estimate >90 >60 mL/min/1.73m2    Calcium 9.6 8.5 - 10.1 mg/dL    Glucose 99 70 - 99 mg/dL    Alkaline Phosphatase 66 40 - 150 U/L    AST 25 0 - 45 U/L    ALT 35 0 - 50 U/L    Protein Total 7.2 6.8 - 8.8 g/dL    Albumin 3.7 3.4 - 5.0 g/dL    Bilirubin Total 0.6 0.2 - 1.3 mg/dL   Troponin T, High Sensitivity     Status: Normal   Result Value Ref Range    Troponin T, High Sensitivity <6 <=14 ng/L   Influenza A & B Antigen - Clinic Collect     Status: Normal    Specimen: Nose; Swab   Result Value Ref Range    Influenza A antigen Negative Negative    Influenza B antigen Negative Negative    Narrative    Test results must be correlated with clinical data. If necessary, results should be  confirmed by a molecular assay or viral culture.      CBC, and cmp reviewed in detail.   Troponin normal.

## 2025-03-04 LAB — SARS-COV-2 RNA RESP QL NAA+PROBE: NEGATIVE

## 2025-04-03 ENCOUNTER — TELEPHONE (OUTPATIENT)
Dept: FAMILY MEDICINE | Facility: CLINIC | Age: 40
End: 2025-04-03

## 2025-04-03 DIAGNOSIS — E66.3 OVERWEIGHT: ICD-10-CM

## 2025-04-03 NOTE — TELEPHONE ENCOUNTER
Prior Authorization Retail Medication Request    Medication/Dose: Wegovy  Diagnosis and ICD code (if different than what is on RX):    New/renewal/insurance change PA/secondary ins. PA:  Previously Tried and Failed:    Rationale:      Insurance   Primary: [employer]  Insurance ID:  [id number]    Secondary (if applicable)  Insurance ID:      Pharmacy Information (if different than what is on RX)  Name:    Phone:    Fax:    Clinic Information  Preferred routing pool for dept communication:

## 2025-04-07 ENCOUNTER — LAB (OUTPATIENT)
Dept: LAB | Facility: CLINIC | Age: 40
End: 2025-04-07
Payer: COMMERCIAL

## 2025-04-07 ENCOUNTER — TELEPHONE (OUTPATIENT)
Dept: ALLERGY | Facility: CLINIC | Age: 40
End: 2025-04-07

## 2025-04-07 DIAGNOSIS — F41.9 ANXIETY: ICD-10-CM

## 2025-04-07 DIAGNOSIS — Z83.49 FAMILY HISTORY OF THYROID DISEASE: ICD-10-CM

## 2025-04-07 LAB
ESTRADIOL SERPL-MCNC: 90 PG/ML
FSH SERPL IRP2-ACNC: 10.3 MIU/ML
INSULIN SERPL-ACNC: 15.5 UU/ML (ref 2.6–24.9)
LH SERPL-ACNC: 15.7 MIU/ML
PROGEST SERPL-MCNC: 1.5 NG/ML
TSH SERPL DL<=0.005 MIU/L-ACNC: 1.44 UIU/ML (ref 0.3–4.2)

## 2025-04-07 PROCEDURE — 83525 ASSAY OF INSULIN: CPT

## 2025-04-07 PROCEDURE — 86376 MICROSOMAL ANTIBODY EACH: CPT

## 2025-04-07 PROCEDURE — 82670 ASSAY OF TOTAL ESTRADIOL: CPT

## 2025-04-07 PROCEDURE — 36415 COLL VENOUS BLD VENIPUNCTURE: CPT

## 2025-04-07 PROCEDURE — 83002 ASSAY OF GONADOTROPIN (LH): CPT

## 2025-04-07 PROCEDURE — 84144 ASSAY OF PROGESTERONE: CPT

## 2025-04-07 PROCEDURE — 84443 ASSAY THYROID STIM HORMONE: CPT

## 2025-04-07 PROCEDURE — 83001 ASSAY OF GONADOTROPIN (FSH): CPT

## 2025-04-07 NOTE — TELEPHONE ENCOUNTER
PRIOR AUTHORIZATION DENIED    Medication: WEGOVY 0.25 MG/0.5ML SC SOAJ    Insurance Company: [employer]-Specialty PA's - Phone [phone] Fax [phone]    Denial Date: 4/7/2025    Denial Reason(s): Excluded

## 2025-04-07 NOTE — TELEPHONE ENCOUNTER
PA Initiation    Medication: WEGOVY 0.25 MG/0.5ML SC SOAJ  Insurance Company: [employer]-Specialty PA's - Phone [phone] Fax [phone]  Pharmacy Filling the Rx: [facility] [place], [place] - [address] [address]  Filling Pharmacy Phone: [phone]  Filling Pharmacy Fax: [phone]  Start Date: 4/7/2025

## 2025-04-08 LAB — THYROPEROXIDASE AB SERPL-ACNC: <10 IU/ML

## 2025-04-09 LAB — TESTOST SERPL-MCNC: 20 NG/DL (ref 8–60)

## 2025-06-28 ENCOUNTER — HOSPITAL ENCOUNTER (EMERGENCY)
Facility: CLINIC | Age: 40
Discharge: HOME | End: 2025-06-28
Attending: STUDENT IN AN ORGANIZED HEALTH CARE EDUCATION/TRAINING PROGRAM | Admitting: STUDENT IN AN ORGANIZED HEALTH CARE EDUCATION/TRAINING PROGRAM
Payer: COMMERCIAL

## 2025-06-28 VITALS
TEMPERATURE: 98.6 F | BODY MASS INDEX: 27.88 KG/M2 | DIASTOLIC BLOOD PRESSURE: 101 MMHG | OXYGEN SATURATION: 99 % | HEART RATE: 102 BPM | HEIGHT: 60 IN | WEIGHT: 142 LBS | RESPIRATION RATE: 20 BRPM | SYSTOLIC BLOOD PRESSURE: 126 MMHG

## 2025-06-28 DIAGNOSIS — M25.561 ACUTE PAIN OF RIGHT KNEE: ICD-10-CM

## 2025-06-28 PROCEDURE — 99284 EMERGENCY DEPT VISIT MOD MDM: CPT | Mod: 25 | Performed by: STUDENT IN AN ORGANIZED HEALTH CARE EDUCATION/TRAINING PROGRAM

## 2025-06-28 PROCEDURE — 250N000013 HC RX MED GY IP 250 OP 250 PS 637: Performed by: STUDENT IN AN ORGANIZED HEALTH CARE EDUCATION/TRAINING PROGRAM

## 2025-06-28 PROCEDURE — 99283 EMERGENCY DEPT VISIT LOW MDM: CPT | Performed by: STUDENT IN AN ORGANIZED HEALTH CARE EDUCATION/TRAINING PROGRAM

## 2025-06-28 RX ORDER — OXYCODONE HYDROCHLORIDE 5 MG/1
10 TABLET ORAL ONCE
Refills: 0 | Status: COMPLETED | OUTPATIENT
Start: 2025-06-28 | End: 2025-06-28

## 2025-06-28 RX ORDER — OXYCODONE HYDROCHLORIDE 5 MG/1
5 TABLET ORAL EVERY 6 HOURS PRN
Qty: 6 TABLET | Refills: 0 | Status: SHIPPED | OUTPATIENT
Start: 2025-06-28

## 2025-06-28 RX ORDER — OXYCODONE HYDROCHLORIDE 5 MG/1
5 TABLET ORAL EVERY 6 HOURS PRN
Qty: 12 TABLET | Refills: 0 | Status: SHIPPED | OUTPATIENT
Start: 2025-06-28 | End: 2025-07-01

## 2025-06-28 RX ADMIN — OXYCODONE HYDROCHLORIDE 10 MG: 5 TABLET ORAL at 19:59

## 2025-06-28 ASSESSMENT — ACTIVITIES OF DAILY LIVING (ADL): ADLS_ACUITY_SCORE: 59

## 2025-06-29 NOTE — DISCHARGE INSTRUCTIONS
Continue take 600 mg of ibuprofen every 6 hours with the 1000 mg of Tylenol per 6 hours do this in a staggered formation and use the oxycodone for breakthrough pain.  Keep the leg elevated above your hip and place ice on the knee to help with swelling.  Reapply Ace wrap to help with swelling and pain control as well as knee brace for the immobilizer and weight-bear as tolerated with crutches until he can see orthopedic in early Monday or Tuesday for reevaluation.

## 2025-06-29 NOTE — ED PROVIDER NOTES
History     Chief Complaint   Patient presents with    Knee Pain     HPI  Shayla Mcclure is a 40 year old female who presents with right knee pain.  She notes that she has been working with orthopedics for outpatient surgery procedure upcoming secondary to a torn meniscus as well as some ACL laxity.  She notes that she has been dealing with her daughter who is currently developmentally challenged and noted that she was carrying her down a hill cut slightly inclined Wednesday when she felt a sharp pop sensation and severe pain in her right knee while she was going down the hill.  She notes that she has difficulty bending and extending the leg with it catching and popping and causing significant pain.  She has had some mild swelling.  She has no numbness tingling down the extremity otherwise.    Allergies:  Allergies   Allergen Reactions    Clindamycin Hives, Rash and Unknown       Problem List:    Patient Active Problem List    Diagnosis Date Noted    Pericardial cyst 07/18/2024     Priority: Medium    Intramural leiomyoma of uterus 10/19/2022     Priority: Medium     Formatting of this note might be different from the original. History of myomectomy through pfannenstiel incision [place] - attempting to get op report. Plan Repeat surgery at 36-(with steroids) or 37 (without steroids) weeks.          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 RIGHT thoracoscopic resection of pericardial cyst   Right 08/19/2024    Pericardial cyst along right cardiophrenic angle    ABDOMEN SURGERY  2012/2015/2023    C-sections    BREAST SURGERY  2001    Breast implants    C/SECTION, LOW TRANSVERSE      delivered at 27w3d for pre-eclampsia with severe features     COSMETIC MAMMOPLASTY AUGMENTATION BILATERAL      when 16  years old    DILATION AND CURETTAGE SUCTION N/A 04/16/2020    Procedure: DILATION AND CURETTAGE, UTERUS, USING SUCTION;  Surgeon: Haydee Mcpherson DO;  Location:  OR    DILATION AND CURETTAGE SUCTION  09/23/2021    repeat procedure for retained POC after prior suction D&C 09/20/2021    DILATION AND CURETTAGE SUCTION N/A 09/20/2021    Procedure: DILATION AND CURETTAGE, UTERUS, USING SUCTION;  Surgeon: Haydee Mcpherson DO;  Location:  OR    ENT SURGERY  1985    [place]    MYOMECTOMY UTERUS  2012    open myomectomy in [place], reported very large fibroids    REPAIR CLEFT PALATE CHILD      THORACOSCOPIC WEDGE RESECTION LUNG Right 08/19/2024    Procedure: RIGHT THORACOSCOPIC RESECTION of pericardial cyst;  Surgeon: Gideon Huerta MD;  Location: [facility]       Family History:    Family History   Problem Relation Age of Onset    Other - See Comments Mother         heavy menstrual cycles    Hashimoto's thyroiditis Mother     Thyroid Disease Mother         Hashimoto    Lupus Father     Leukemia Maternal Grandmother     Other Cancer Maternal Grandmother         Leukemia    Lupus Maternal Aunt     Mental Illness Daughter         Psychosis, ocd anxiety    Genetic Disorder Daughter         HURLER Syndrome    Cancer No family hx of         Scooter's Syndrome    Anesthesia Reaction No family hx of     Venous thrombosis No family hx of        Social History:  Marital Status:   [2]  Social History     Tobacco Use    Smoking status: Never     Passive exposure: Never    Smokeless tobacco: Never   Vaping Use    Vaping status: Never Used   Substance Use Topics    Alcohol use: Yes     Comment: Occasionally    Drug use: No        Medications:    oxyCODONE (ROXICODONE) 5 MG tablet  oxyCODONE (ROXICODONE) 5 MG tablet  acetaminophen (TYLENOL) 325 MG tablet  semaglutide-weight management (WEGOVY) 0.25 MG/0.5ML pen  tretinoin (RETIN-A) 0.025 % external cream          Review of Systems   Musculoskeletal:         Right knee  pain   All other systems reviewed and are negative.      Physical Exam   BP: (!) 126/101  Pulse: 102  Temp: 98.6  F (37  C)  Resp: 20  Height: 152.4 cm (5')  Weight: 64.4 kg (142 lb)  SpO2: 99 %      Physical Exam  Vitals and nursing note reviewed.   Constitutional:       General: She is not in acute distress.     Appearance: Normal appearance. She is not diaphoretic.   HENT:      Head: Atraumatic.      Mouth/Throat:      Mouth: Mucous membranes are moist.   Eyes:      General: No scleral icterus.     Conjunctiva/sclera: Conjunctivae normal.   Cardiovascular:      Rate and Rhythm: Normal rate.      Heart sounds: Normal heart sounds.   Pulmonary:      Effort: No respiratory distress.      Breath sounds: Normal breath sounds.   Abdominal:      General: Abdomen is flat.   Musculoskeletal:         General: Swelling and tenderness present. No deformity.      Cervical back: Neck supple.      Right knee: Effusion present. No swelling, deformity or erythema. Tenderness present. No medial joint line, lateral joint line, MCL, LCL, ACL, PCL or patellar tendon tenderness. No LCL laxity, MCL laxity, ACL laxity or PCL laxity. Abnormal meniscus. Normal pulse.      Right lower leg: No edema.      Left lower leg: No edema.   Skin:     General: Skin is warm.      Findings: No rash.   Neurological:      Mental Status: She is alert.         ED Course        Procedures                No results found for this or any previous visit (from the past 24 hours).    Medications   oxyCODONE (ROXICODONE) tablet 10 mg (has no administration in time range)       Assessments & Plan (with Medical Decision Making)     I have reviewed the nursing notes.    I have reviewed the findings, diagnosis, plan and need for follow up with the patient.    Medical Decision Making  Shayla Mcclure is a 40 year old female who presents with right knee pain.  She notes that she has been working with orthopedics for outpatient surgery procedure upcoming secondary to a  torn meniscus as well as some ACL laxity.  She notes that she has been dealing with her daughter who is currently developmentally challenged and noted that she was carrying her down a hill cut slightly inclined Wednesday when she felt a sharp pop sensation and severe pain in her right knee while she was going down the hill.  She notes that she has difficulty bending and extending the leg with it catching and popping and causing significant pain.  She has had some mild swelling.  She has no numbness tingling down the extremity otherwise.    Vitals are reassuring aside from mild tachycardia but patient is uncomfortable appearing.  She has a mildly swollen right knee with pain with flexion extension only able to get approximate 30% straightened and 30% flex.  She is got a little bit of catching and popping with severe sudden onset pain consistent with meniscal injury.  She has no signs of acute ACL or PCL laxity and has no tenderness along the ACL MCL or LCL.  She has no patellar pain with movement and is otherwise has appropriate quadricep bulk as well as Achilles tendon function.  She is neurovascular intact distally to extremity.  With no acute trauma likely a reinjury of her meniscal injury that she is currently being managed for.  We discussed that plan for imaging is of no benefit at this time ultimately patient may require repeat MRI however that this may also be excluded if patient is undergoing a arthroscopic procedure upcoming.  We discussed some pain control as well as Ace wrap knee brace and crutches to help with support and home management until patient is seen by orthopedics on Monday or Tuesday.  She was happy with this plan.  We discussed return precautions and patient was discharged home after a dose of oxycodone crutches and knee brace.      New Prescriptions    OXYCODONE (ROXICODONE) 5 MG TABLET    Take 1 tablet (5 mg) by mouth every 6 hours as needed for severe pain.    OXYCODONE (ROXICODONE) 5 MG  TABLET    Take 1 tablet (5 mg) by mouth every 6 hours as needed.       Final diagnoses:   Acute pain of right knee       6/28/2025   [facility] EMERGENCY DEPT       Bijal Porras MD  06/28/25 1959

## 2025-07-03 ENCOUNTER — ANCILLARY PROCEDURE (OUTPATIENT)
Dept: GENERAL RADIOLOGY | Facility: CLINIC | Age: 40
End: 2025-07-03
Attending: ORTHOPAEDIC SURGERY
Payer: COMMERCIAL

## 2025-07-03 ENCOUNTER — OFFICE VISIT (OUTPATIENT)
Dept: ORTHOPEDICS | Facility: CLINIC | Age: 40
End: 2025-07-03
Payer: COMMERCIAL

## 2025-07-03 ENCOUNTER — MYC MEDICAL ADVICE (OUTPATIENT)
Dept: ORTHOPEDICS | Facility: CLINIC | Age: 40
End: 2025-07-03

## 2025-07-03 ENCOUNTER — TELEPHONE (OUTPATIENT)
Dept: ORTHOPEDICS | Facility: CLINIC | Age: 40
End: 2025-07-03

## 2025-07-03 DIAGNOSIS — M25.561 RIGHT KNEE PAIN, UNSPECIFIED CHRONICITY: Primary | ICD-10-CM

## 2025-07-03 DIAGNOSIS — S83.206A TEAR OF MENISCUS OF RIGHT KNEE, UNSPECIFIED MENISCUS, UNSPECIFIED TEAR TYPE, UNSPECIFIED WHETHER OLD OR CURRENT TEAR: ICD-10-CM

## 2025-07-03 PROCEDURE — 73562 X-RAY EXAM OF KNEE 3: CPT | Mod: RT | Performed by: RADIOLOGY

## 2025-07-03 PROCEDURE — 99214 OFFICE O/P EST MOD 30 MIN: CPT | Performed by: ORTHOPAEDIC SURGERY

## 2025-07-03 RX ORDER — DICLOFENAC SODIUM 75 MG/1
75 TABLET, DELAYED RELEASE ORAL 2 TIMES DAILY
Qty: 60 TABLET | Refills: 0 | Status: SHIPPED | OUTPATIENT
Start: 2025-07-03

## 2025-07-03 NOTE — TELEPHONE ENCOUNTER
Phoned patient to schedule surgery with Dr Burgos. I left her my direct number to call back at her convenience. [phone]

## 2025-07-03 NOTE — PROGRESS NOTES
.Name returns to my clinic today for interval follow-up very pleasant 40-year-old female.  I previously saw her and discussed the possibility of performing arthroscopic meniscectomy versus meniscus root repair however giving some changes in her life she was not able to proceed with surgery at that time.  And ultimately chose not on surgery.  An injection was completed.  This was helpful to the patient.  She had a new episode June 24, 2025 she was carrying her daughter who is special needs down a hill and had immediate onset of knee pain.  Difficulty bearing weight since that time.  Presents to my clinic today.    Examination today shows profound medial joint line tenderness.  Range of motion is locked from 15 to 30 degrees.  No flexion beyond that is present.  Lachman testing could not be performed.  Pivot shift testing could not be performed.  Stable to varus valgus stress testing.  Stable posterior drawer testing.    Imaging: Medial meniscus tear though no significant displacement is noted when I personally reviewed the prior imaging.  There was some concern for a meniscus root tear    Clinical assessment: Concern for displacement of medial meniscus tear observed on prior imaging    Plan: Long discussion today with the patient.  Reviewed the diagnosis potential treatment options.  I would get an MRI of her knee as I think she is displaced her meniscus tear and I want to confirm that there is not an worsening of her root tear and progressive arthritis or subchondral collapse.    Addendum: MRI obtained today given her locked knee and reviewed by myself which does show displacement of the medial meniscus.  The meniscus root is intact.  At this time I think realistically the patient should have an arthroscopic meniscectomy.  I discussed with her I will plan to reach out to her so the patient knows that I have this offer.  She is officially allowed to be weightbearing as tolerated and have range of motion as tolerated  she can wean from the crutches and wean from the brace.  I do think that given the displaced meniscus tear and arthroscopic meniscectomy is the right course of action for her.  Will work with my team and we can arrange a visit to discuss

## 2025-07-03 NOTE — NURSING NOTE
Reason For Visit:   Chief Complaint   Patient presents with    Right Knee - Pain       ?  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  Work status?  Full time.  Date of injury: 6/24/25  Type of injury: was carrying daughter down a hill.      Sane Score  Right  knee - Affected  Left Knee- 90  Right Knee- 10      Dale Browne, ATC

## 2025-07-03 NOTE — LETTER
7/3/2025      Shayla Mcclure  [address] [address]      Dear Colleague,    Thank you for referring your patient, Shayla Mcclure, to the [facility]. Please see a copy of my visit note below.    .Name returns to my clinic today for interval follow-up very pleasant 40-year-old female.  I previously saw her and discussed the possibility of performing arthroscopic meniscectomy versus meniscus root repair however giving some changes in her life she was not able to proceed with surgery at that time.  And ultimately chose not on surgery.  An injection was completed.  This was helpful to the patient.  She had a new episode June 24, 2025 she was carrying her daughter who is special needs down a hill and had immediate onset of knee pain.  Difficulty bearing weight since that time.  Presents to my clinic today.    Examination today shows profound medial joint line tenderness.  Range of motion is locked from 15 to 30 degrees.  No flexion beyond that is present.  Lachman testing could not be performed.  Pivot shift testing could not be performed.  Stable to varus valgus stress testing.  Stable posterior drawer testing.    Imaging: Medial meniscus tear though no significant displacement is noted when I personally reviewed the prior imaging.  There was some concern for a meniscus root tear    Clinical assessment: Concern for displacement of medial meniscus tear observed on prior imaging    Plan: Long discussion today with the patient.  Reviewed the diagnosis potential treatment options.  I would get an MRI of her knee as I think she is displaced her meniscus tear and I want to confirm that there is not an worsening of her root tear and progressive arthritis or subchondral collapse.    Addendum: MRI obtained today given her locked knee and reviewed by myself which does show displacement of the medial meniscus.  The meniscus root is intact.  At this time I think realistically the patient should  have an arthroscopic meniscectomy.  I discussed with her I will plan to reach out to her so the patient knows that I have this offer.  She is officially allowed to be weightbearing as tolerated and have range of motion as tolerated she can wean from the crutches and wean from the brace.  I do think that given the displaced meniscus tear and arthroscopic meniscectomy is the right course of action for her.  Will work with my team and we can arrange a visit to discuss    Again, thank you for allowing me to participate in the care of your patient.        Sincerely,        Jamie Burgos MD    Electronically signed

## 2025-07-07 PROBLEM — M25.561 RIGHT KNEE PAIN, UNSPECIFIED CHRONICITY: Status: ACTIVE | Noted: 2025-07-03

## 2025-07-07 NOTE — TELEPHONE ENCOUNTER
Incoming call to schedule surgery with Dr. Burgos    Spoke with: Shayla (patient)    Reason for Surgical Date: Patient Preference : Patient offered 7/24 in [place], 8/6 at [facility]     Date of Surgery: 8/8/25    Estimated Arrival time Discussed with Patient:  No    Location of surgery: [facility]     Pre-Op H&P: Prefers to schedule with PCP, Margaret Aragon PA-C    Imaging: No     Additional Pre-Op Appointments: Yes Telephone visit with Dr Burgos on 7/9/25     Post-Op Appt Date w/ NP/PA:  Genoveva Lam PA-C  8/19/25 at 11:20am    Post-Op Appt Date w/ Surgeon  [place]  9/18/25 at 2:40pm     Additional Post-Op Appointments: No     Discussed with patient pre-op RN will call 2-3 days prior to surgery with arrival time and instructions:  Yes     Standard Surgery Packet Sent: Yes 07/07/25  via Mail - Standard      Additional Comments: N/A      Corinna Porras MA on 7/7/2025 at 10:26 AM

## 2025-07-09 ENCOUNTER — VIRTUAL VISIT (OUTPATIENT)
Dept: ORTHOPEDICS | Facility: CLINIC | Age: 40
End: 2025-07-09
Payer: COMMERCIAL

## 2025-07-09 DIAGNOSIS — S83.206A TEAR OF MENISCUS OF RIGHT KNEE, UNSPECIFIED MENISCUS, UNSPECIFIED TEAR TYPE, UNSPECIFIED WHETHER OLD OR CURRENT TEAR: Primary | ICD-10-CM

## 2025-07-09 PROCEDURE — 99207 PR NO BILLABLE SERVICE THIS VISIT: CPT | Performed by: ORTHOPAEDIC SURGERY

## 2025-07-09 NOTE — LETTER
7/9/2025      Shayla Mcclure  [address] [address]      Dear Colleague,    Thank you for referring your patient, Shayla Mcclure, to the [facility] [place]. Please see a copy of my visit note below.    Shayla is a 40 year old who is being evaluated via a billable telephone visit.        I had opportunity to do a telephone visit with the patient today to review her MRI results.  She is a very pleasant 40-year-old female had an acute event.  Could not bear weight on her leg.  Her knee was locked.  We got an MRI.  It shows a complex and displaced tear of her meniscus.  I did relay these results to her via [employer] that we did come together for a telephone visit to discuss.    No examination was completed today as this was a telephone visit.    MRI reviewed by myself which shows a complex and displaced tear of the medial meniscus of the right knee.    Cruciate and collateral ligaments are intact.  Early chondrosis the medial compartment patellofemoral compartment is noted though no full-thickness cartilage loss is present.    Clinical assessment: Complex and displaced tear of the medial meniscus right knee    Plan: Long discussion today with the patient.  Offered her arthroscopic meniscectomy.  Discussed with her the pros cons risk and benefits of surgery versus on surgery.  Discussed expected course recovery alternative treatment options.  Will look for a time on the schedule to get this completed.  She is planning in August.      What phone number would you like to be contacted at? [phone]  How would you like to obtain your AVS? [employer]  Originating Location (pt. Location): Home    Distant Location (provider location):  On-site  Telephone visit completed due to the patient did not have access to video, while the distant provider did.  Phone call duration: 5 minutes      Again, thank you for allowing me to participate in the care of your patient.         Sincerely,        Jamie Burgos MD    Electronically signed

## 2025-07-09 NOTE — PROGRESS NOTES
Shayla is a 40 year old who is being evaluated via a billable telephone visit.        I had opportunity to do a telephone visit with the patient today to review her MRI results.  She is a very pleasant 40-year-old female had an acute event.  Could not bear weight on her leg.  Her knee was locked.  We got an MRI.  It shows a complex and displaced tear of her meniscus.  I did relay these results to her via [employer] that we did come together for a telephone visit to discuss.    No examination was completed today as this was a telephone visit.    MRI reviewed by myself which shows a complex and displaced tear of the medial meniscus of the right knee.    Cruciate and collateral ligaments are intact.  Early chondrosis the medial compartment patellofemoral compartment is noted though no full-thickness cartilage loss is present.    Clinical assessment: Complex and displaced tear of the medial meniscus right knee    Plan: Long discussion today with the patient.  Offered her arthroscopic meniscectomy.  Discussed with her the pros cons risk and benefits of surgery versus on surgery.  Discussed expected course recovery alternative treatment options.  Will look for a time on the schedule to get this completed.  She is planning in August.      What phone number would you like to be contacted at? [phone]  How would you like to obtain your AVS? [employer]  Originating Location (pt. Location): Home    Distant Location (provider location):  On-site  Telephone visit completed due to the patient did not have access to video, while the distant provider did.  Phone call duration: 5 minutes

## 2025-07-28 ENCOUNTER — OFFICE VISIT (OUTPATIENT)
Dept: FAMILY MEDICINE | Facility: CLINIC | Age: 40
End: 2025-07-28
Payer: COMMERCIAL

## 2025-07-28 VITALS
DIASTOLIC BLOOD PRESSURE: 68 MMHG | TEMPERATURE: 98.1 F | HEART RATE: 100 BPM | SYSTOLIC BLOOD PRESSURE: 118 MMHG | BODY MASS INDEX: 27.6 KG/M2 | WEIGHT: 140.56 LBS | RESPIRATION RATE: 16 BRPM | HEIGHT: 60 IN | OXYGEN SATURATION: 100 %

## 2025-07-28 DIAGNOSIS — Z01.818 PREOP GENERAL PHYSICAL EXAM: Primary | ICD-10-CM

## 2025-07-28 DIAGNOSIS — S83.231D COMPLEX TEAR OF MEDIAL MENISCUS OF RIGHT KNEE, UNSPECIFIED WHETHER OLD OR CURRENT TEAR, SUBSEQUENT ENCOUNTER: ICD-10-CM

## 2025-07-28 PROCEDURE — 93000 ELECTROCARDIOGRAM COMPLETE: CPT | Performed by: NURSE PRACTITIONER

## 2025-07-28 PROCEDURE — 3074F SYST BP LT 130 MM HG: CPT | Performed by: NURSE PRACTITIONER

## 2025-07-28 PROCEDURE — 99214 OFFICE O/P EST MOD 30 MIN: CPT | Performed by: NURSE PRACTITIONER

## 2025-07-28 PROCEDURE — 3078F DIAST BP <80 MM HG: CPT | Performed by: NURSE PRACTITIONER

## 2025-07-28 PROCEDURE — 1126F AMNT PAIN NOTED NONE PRSNT: CPT | Performed by: NURSE PRACTITIONER

## 2025-07-28 ASSESSMENT — PAIN SCALES - GENERAL: PAINLEVEL_OUTOF10: NO PAIN (0)

## 2025-07-28 NOTE — PATIENT INSTRUCTIONS
How to Take Your Medication Before Surgery  Preoperative Medication Instructions   Antiplatelet or Anticoagulation Medication Instructions   - We reviewed the medication list and the patient is not on an antiplatelet or anticoagulation medications.    Additional Medication Instructions  Take all scheduled medications on the day of surgery EXCEPT for modifications listed below:   - diclofenac (Voltaren): DO NOT TAKE for 3 days for high bleeding risk or PRN use.   - GLP-1 Injectable (exenitide, liraglutide, semaglutide, dulaglutide, etc.): DO NOT TAKE 7 days before surgery        Patient Education   Preparing for Your Surgery  For Adults  Getting started  In most cases, a nurse will call to review your health history and instructions. They will give you an arrival time based on your scheduled surgery time. Please be ready to share:  Your doctor's clinic name and phone number  Your medical, surgical, and anesthesia history  A list of allergies and sensitivities  A list of medicines, including herbal treatments and over-the-counter drugs  Whether the patient has a legal guardian (ask how to send us the papers in advance)  Note: You may not receive a call if you were seen at our PAC (Preoperative Assessment Center).  Please tell us if you're pregnant--or if there's any chance you might be pregnant. Some surgeries may injure a fetus (unborn baby), so they require a pregnancy test. Surgeries that are safe for a fetus don't always need a test, and you can choose whether to have one.   Preparing for surgery  Within 10 to 30 days of surgery: Have a pre-op exam (sometimes called an H&P, or History and Physical). This can be done at a clinic or pre-operative center.  If you're having a , you may not need this exam. Talk to your care team.  At your pre-op exam, talk to your care team about all medicines you take. (This includes CBD oil and any drugs, such as THC, marijuana, and other forms of cannabis.) If you need to  stop any medicine before surgery, ask when to start taking it again.  This is for your safety. Many medicines and drugs can make you bleed too much during surgery. Some change how well surgery (anesthesia) drugs work.  Call your insurance company to let them know you're having surgery. (If you don't have insurance, call [phone].)  Call your clinic if there's any change in your health. This includes a scrape or scratch near the surgery site, or any signs of a cold (sore throat, runny nose, cough, rash, fever).  Eating and drinking guidelines  For your safety: Unless your surgeon tells you otherwise, follow the guidelines below.  Eat and drink as normal until 8 hours before you arrive for surgery. After that, no food or milk. You can spit out gum when you arrive.  Drink clear liquids until 2 hours before you arrive. These are liquids you can see through, like water, Gatorade, and Propel Water. They also include plain black coffee and tea (no cream or milk).  No alcohol for 24 hours before you arrive. The night before surgery, stop any drinks that contain THC.  If your care team tells you to take medicine on the morning of surgery, it's okay to take it with a sip of water. No other medicines or drugs are allowed (including CBD oil)--follow your care team's instructions.  If you have questions the day of surgery, call your hospital or surgery center.   Preventing infection  Shower or bathe the night before and the morning of surgery. Follow the instructions your clinic gave you. (If no instructions, use regular soap.)  Don't shave or clip hair near your surgery site. We'll remove the hair if needed.  Don't smoke or vape the morning of surgery. No chewing tobacco for 6 hours before you arrive. A nicotine patch is okay. You may spit out nicotine gum when you arrive.  For some surgeries, the surgeon will tell you to fully quit smoking and nicotine.  We will make every effort to keep you safe from infection. We  will:  Clean our hands often with soap and water (or an alcohol-based hand rub).  Clean the skin at your surgery site with a special soap that kills germs.  Give you a special gown to keep you warm. (Cold raises the risk of infection.)  Wear hair covers, masks, gowns, and gloves during surgery.  Give antibiotic medicine, if prescribed. Not all surgeries need this medicine.  What to bring on the day of surgery  Photo ID and insurance card  Copy of your health care directive, if you have one  Glasses and hearing aids (bring cases)  You can't wear contacts during surgery  Inhaler and eye drops, if you use them (tell us about these when you arrive)  CPAP machine or breathing device, if you use them  A few personal items, if spending the night  If you have . . .  A pacemaker, ICD (cardiac defibrillator), or other implant: Bring the ID card.  An implanted stimulator: Bring the remote control.  A legal guardian: Bring a copy of the certified (court-stamped) guardianship papers.  Please remove any jewelry, including body piercings. Leave jewelry and other valuables at home.  If you're going home the day of surgery  You must have a support person drive you home. They should stay with you overnight, and they may need to help with your self-care.  If you don't have a support person, please tells us as soon as possible. We can help.  After surgery  If it's hard to control your pain or you need more pain medicine, please call your surgeon's office.  Questions?   If you have any questions for your care team, list them here:   ____________________________________________________________________________________________________________________________________________________________________________________________________________________________________________________________  For informational purposes only. Not to replace the advice of your health care provider. Copyright   2003, 2019 [facility]. All rights  reserved. Clinically reviewed by Juan Antonio Hair MD. [employer] [id number] - REV 02/25.

## 2025-07-28 NOTE — PROGRESS NOTES
Preoperative Evaluation  [facility]  [address], [address]  [place] [address]  Phone: [phone]  Fax: [phone]  Primary Provider: Margaret Aragon PA-C  Pre-op Performing Provider: CLAUDIA Marina CNP  Jul 28, 2025 7/28/2025   Surgical Information   What procedure is being done? right knee examination under anesthesia, knee arthroscopy, meniscectomy    Facility or Hospital where procedure/surgery will be performed: [facility]   Who is doing the procedure / surgery? Dr. Burgos   Date of surgery / procedure: 8/8/25   Time of surgery / procedure: TBD   Where do you plan to recover after surgery? at home with family     Fax number for surgical facility: Note does not need to be faxed, will be available electronically in Epic.    Assessment & Plan     The proposed surgical procedure is considered LOW risk.      ICD-10-CM    1. Preop general physical exam  Z01.818 EKG 12-lead complete w/read - Clinics      2. Complex tear of medial meniscus of right knee, unspecified whether old or current tear, subsequent encounter  S83.231D                    Risks and Recommendations  The patient has the following additional risks and recommendations for perioperative complications:  Social and Substance:    - Social concerns that may affect postoperative recovery plan, including disabled daughter at home, will need time for recovery to avoid heavy lifting.     Antiplatelet or Anticoagulation Medication Instructions   - We reviewed the medication list and the patient is not on an antiplatelet or anticoagulation medications.    Additional Medication Instructions  Take all scheduled medications on the day of surgery EXCEPT for modifications listed below:   - diclofenac (Voltaren): DO NOT TAKE for 3 days for high bleeding risk or PRN use.   - GLP-1 Injectable (exenitide, liraglutide, semaglutide, dulaglutide, etc.): DO NOT TAKE 7 days before surgery      Recommendation  Approval given to proceed with proposed procedure, without further diagnostic evaluation.        Hay Mcguire is a 40 year old, presenting for the following:  Pre-Op Exam          7/28/2025     5:00 PM   Additional Questions   Roomed by VE     HPI: right knee pain and injury carrying disabled on a hill a month ago. Advanced imaging shows complex medial meniscus tear necessitating surgical intervention.              7/28/2025   Pre-Op Questionnaire   Have you ever had a heart attack or stroke? No   Have you ever had surgery on your heart or blood vessels, such as a stent placement, a coronary artery bypass, or surgery on an artery in your head, neck, heart, or legs? No   Do you have chest pain with activity? No   Do you have a history of heart failure? No   Do you currently have a cold, bronchitis or symptoms of other infection? No   Do you have a cough, shortness of breath, or wheezing? No   Do you or anyone in your family have previous history of blood clots? No   Do you or does anyone in your family have a serious bleeding problem such as prolonged bleeding following surgeries or cuts? No   Have you ever had problems with anemia or been told to take iron pills? No   Have you had any abnormal blood loss such as black, tarry or bloody stools, or abnormal vaginal bleeding? No   Have you ever had a blood transfusion? No   Are you willing to have a blood transfusion if it is medically needed before, during, or after your surgery? Yes   Have you or any of your relatives ever had problems with anesthesia? No   Do you have sleep apnea, excessive snoring or daytime drowsiness? No, does not think so   Do you have any artifical heart valves or other implanted medical devices like a pacemaker, defibrillator, or continuous glucose monitor? No   Do you have artificial joints? No   Are you allergic to latex? No     Advance Care Planning    Discussed advance care planning with patient; however, patient  declined at this time.    Preoperative Review of    reviewed - not taking Oxycodone since starting Diclofenac      Status of Chronic Conditions:  See problem list for active medical problems.  Problems all longstanding and stable, except as noted/documented.  See ROS for pertinent symptoms related to these conditions.    Patient Active Problem List    Diagnosis Date Noted    Right knee pain, unspecified chronicity 07/03/2025     Priority: Medium    Pericardial cyst 07/18/2024     Priority: Medium    Intramural leiomyoma of uterus 10/19/2022     Priority: Medium     Formatting of this note might be different from the original. History of myomectomy through pfannenstiel incision [place] - attempting to get op report. Plan Repeat surgery at 36-(with steroids) or 37 (without steroids) weeks.        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 RIGHT thoracoscopic resection of pericardial cyst   Right 08/19/2024    Pericardial cyst along right cardiophrenic angle    C/SECTION, LOW TRANSVERSE      2012, 2015, 2023 delivered at 27w3d for pre-eclampsia with severe features    COSMETIC MAMMOPLASTY AUGMENTATION BILATERAL  2001    DILATION AND CURETTAGE SUCTION N/A 04/16/2020    Procedure: DILATION AND CURETTAGE, UTERUS, USING SUCTION;  Surgeon: Haydee Mcpherson DO;  Location:  OR    DILATION AND CURETTAGE SUCTION  09/23/2021    repeat procedure for retained POC after prior suction D&C 09/20/2021    DILATION AND CURETTAGE SUCTION N/A 09/20/2021    Procedure: DILATION AND CURETTAGE, UTERUS, USING SUCTION;  Surgeon: Haydee Mcpherson DO;  Location:  OR     MYOMECTOMY UTERUS  2012    open myomectomy in [place], reported very large fibroids    REPAIR CLEFT PALATE CHILD  1985    THORACOSCOPIC WEDGE RESECTION LUNG Right 08/19/2024    Procedure: RIGHT THORACOSCOPIC RESECTION of pericardial cyst;  Surgeon: Gideon Huerta MD;  Location: [facility] OR     Current Outpatient Medications   Medication Sig Dispense Refill    acetaminophen (TYLENOL) 325 MG tablet Take 3 tablets (975 mg) by mouth every 8 hours 120 tablet 0    diclofenac (VOLTAREN) 75 MG EC tablet Take 1 tablet (75 mg) by mouth 2 times daily. 60 tablet 0    semaglutide-weight management (WEGOVY) 0.25 MG/0.5ML pen Inject 0.5 mLs (0.25 mg) subcutaneously once a week. 2 mL 0    tretinoin (RETIN-A) 0.025 % external cream Apply a pea-sized amount evenly over face at nighttime before bed. Start using every other night. 45 g 11       Allergies   Allergen Reactions    Clindamycin Hives, Rash and Unknown        Social History     Tobacco Use    Smoking status: Never     Passive exposure: Never    Smokeless tobacco: Never   Substance Use Topics    Alcohol use: Yes     Comment: Occasionally       History   Drug Use No             Review of Systems  Constitutional, neuro, ENT, endocrine, pulmonary, cardiac, gastrointestinal, genitourinary, musculoskeletal, integument and psychiatric systems are negative, except as otherwise noted.    Objective    /68 (BP Location: Left arm, Patient Position: Chair, Cuff Size: Adult Regular)   Pulse 100   Temp 98.1  F (36.7  C) (Temporal)   Resp 16   Ht 1.524 m (5')   Wt 63.8 kg (140 lb 9 oz)   LMP 07/14/2025 (Exact Date)   SpO2 100%   Breastfeeding No   BMI 27.45 kg/m     Estimated body mass index is 27.45 kg/m  as calculated from the following:    Height as of this encounter: 1.524 m (5').    Weight as of this encounter: 63.8 kg (140 lb 9 oz).  Physical Exam  GENERAL: alert and no distress  EYES: Eyes grossly normal to inspection, PERRL and conjunctivae and sclerae  normal  HENT: ear canals and TM's normal, nose and mouth without ulcers or lesions  NECK: no adenopathy, no asymmetry, masses, or scars  RESP: lungs clear to auscultation - no rales, rhonchi or wheezes  CV: regular rate and rhythm, normal S1 S2, no S3 or S4, no murmur, click or rub, no peripheral edema  ABDOMEN: soft, nontender, no hepatosplenomegaly, no masses and bowel sounds normal  MS: no gross musculoskeletal defects noted, no edema  SKIN: no suspicious lesions or rashes  NEURO: Normal strength and tone, mentation intact and speech normal  PSYCH: mentation appears normal, affect normal/bright    Recent Labs   Lab Test 03/03/25  1241 08/30/24  1932   HGB 13.6 12.1    414    138   POTASSIUM 3.8 4.5   CR 0.60 0.45*        Diagnostics  No labs were ordered during this visit.   EKG: appears normal, NSR, normal axis, normal intervals, no acute ST/T changes c/w ischemia, no LVH by voltage criteria, unchanged from previous tracings    Revised Cardiac Risk Index (RCRI)  The patient has the following serious cardiovascular risks for perioperative complications:   - No serious cardiac risks = 0 points     RCRI Interpretation: 0 points: Class I (very low risk - 0.4% complication rate)         Signed Electronically by: CLAUDIA Marina CNP  A copy of this evaluation report is provided to the requesting physician.

## 2025-07-29 ENCOUNTER — TELEPHONE (OUTPATIENT)
Dept: ORTHOPEDICS | Facility: CLINIC | Age: 40
End: 2025-07-29
Payer: COMMERCIAL

## 2025-07-29 NOTE — TELEPHONE ENCOUNTER
Other: Patient called and is wanting to know what time she should be there for her surgery. Please call patient with info.     Could we send this information to you in [place] or would you prefer to receive a phone call?:   Patient would prefer a phone call   Okay to leave a detailed message?: Yes at Cell number on file:    Telephone Information:   Mobile [phone]

## 2025-07-29 NOTE — TELEPHONE ENCOUNTER
Writer called patient and informed her that as of right know surgery is scheduled for 12:30pm and they would need to arrive 2 hours prior however patient was informed that she will be contacted 24-48 hours prior from surgery coordinators to confirm time of surgery and arrival time as surgery start time could change. Patient ok with plan.    Gennaro Diaz CMA

## 2025-08-27 ENCOUNTER — PATIENT OUTREACH (OUTPATIENT)
Dept: CARE COORDINATION | Facility: CLINIC | Age: 40
End: 2025-08-27
Payer: COMMERCIAL

## (undated) DEVICE — Device

## (undated) DEVICE — SU VICRYL 4-0 PS-2 18" UND J496H

## (undated) DEVICE — DRAPE IOBAN INCISE 23X17" 6650EZ

## (undated) DEVICE — SUCTION DRY CHEST DRAIN OASIS 3600-100

## (undated) DEVICE — PREP POVIDONE IODINE SOLUTION 10% 120ML

## (undated) DEVICE — SU VICRYL+ 3-0 27IN SH UND VCP416H

## (undated) DEVICE — DRSG STERI STRIP 1/2X4" R1547

## (undated) DEVICE — LINEN GOWN XLG 5407

## (undated) DEVICE — GLOVE BIOGEL PI MICRO SZ 8.0 48580

## (undated) DEVICE — LUBRICANT INST KIT ENDO-LUBE 220-90

## (undated) DEVICE — SYR 10ML FINGER CONTROL W/O NDL 309695

## (undated) DEVICE — PEN MARKING SKIN

## (undated) DEVICE — PREP CHLORAPREP 26ML TINTED HI-LITE ORANGE 930815

## (undated) DEVICE — ESU GROUND PAD ADULT W/CORD E7507

## (undated) DEVICE — SUCTION CANNULA UTERINE 08MM CVD  20317

## (undated) DEVICE — DRAPE LEGGINGS 8421

## (undated) DEVICE — LINEN TOWEL PACK X30 5481

## (undated) DEVICE — SPONGE RAY-TEC 4X4" 7317

## (undated) DEVICE — SU VICRYL 0 UR-6 27" J603H

## (undated) DEVICE — TUBING IRR SUCTION SET GYRUS

## (undated) DEVICE — SU SILK 0 SH 30" K834H

## (undated) DEVICE — LINEN TOWEL PACK X6 WHITE 5487

## (undated) DEVICE — PACK MINOR SBA15MIFSE

## (undated) DEVICE — SPONGE TONSIL W/STRING MED 23275-680

## (undated) DEVICE — DRAPE SHEET HALF 40X60" 9358

## (undated) DEVICE — LABEL MEDICATION SYSTEM  3304

## (undated) DEVICE — DRAIN JACKSON PRATT ROUND SIL 19FR W/TROCAR LF JP-2232

## (undated) DEVICE — GLOVE BIOGEL PI MICRO INDICATOR UNDERGLOVE SZ 8.5 48985

## (undated) DEVICE — NDL SPINAL WHITACRE 22GA 3.5" 405010

## (undated) DEVICE — SU SILK 0 TIE 6X30" A306H

## (undated) DEVICE — TIES BANDING T50R

## (undated) DEVICE — GOWN XLG DISP 9545

## (undated) DEVICE — LUBRICATING JELLY 4.25OZ

## (undated) DEVICE — PACK BASIC SET-UP 9101

## (undated) DEVICE — GLOVE PROTEXIS BLUE W/NEU-THERA 6.0  2D73EB60

## (undated) DEVICE — ESU PENCIL SMOKE EVAC W/ROCKER SWITCH 0703-047-000

## (undated) DEVICE — NDL SPINAL 22GA 3.5" QUINCKE 405181

## (undated) DEVICE — DRSG PRIMAPORE 02X3" 7133

## (undated) DEVICE — ENDO VALVE BX EVIS MAJ-210

## (undated) DEVICE — PREP SKIN SCRUB TRAY 4461A

## (undated) DEVICE — PREP POVIDONE IODINE SCRUB 7.5% 120ML

## (undated) DEVICE — SYR 30ML LL W/O NDL 302832

## (undated) DEVICE — BASIN SET MINOR DISP

## (undated) DEVICE — ENDO SCOPE WARMER SEAL  C3101

## (undated) DEVICE — DRSG PRIMAPORE 03 1/8X6" 66000318

## (undated) DEVICE — TUBING SUCTION 10'X3/16" N510

## (undated) DEVICE — ADH LIQUID MASTISOL TOPICAL VIAL 2-3ML 0523-48

## (undated) DEVICE — SOL WATER IRRIG 1000ML BOTTLE 07139-09

## (undated) DEVICE — ATTACHMENT DEVICE DRAIN/TUBE 9781

## (undated) DEVICE — PAD PERI W/WINGS 1580A

## (undated) DEVICE — SUCTION MANIFOLD NEPTUNE 2 SYS 4 PORT 0702-020-000

## (undated) DEVICE — GLOVE PROTEXIS W/NEU-THERA 5.5  2D73TE55

## (undated) DEVICE — CONNECTOR BLAKE DRAIN SGL BCC1

## (undated) DEVICE — TUBING VACUUM COLLECTION 6FT 23116

## (undated) DEVICE — SURGICEL HEMOSTAT 4X8" 1952

## (undated) DEVICE — TUBING SMOKE EVAC PNEUMOCLEAR HIGH FLOW 0620050250

## (undated) DEVICE — CATH SELF FEMALE 14FR 6"

## (undated) DEVICE — ENDO VALVE SUCTION BRONCH EVIS MAJ-209

## (undated) DEVICE — DRSG TELFA 3X8" 1238

## (undated) RX ORDER — FENTANYL CITRATE 50 UG/ML
INJECTION, SOLUTION INTRAMUSCULAR; INTRAVENOUS
Status: DISPENSED
Start: 2024-08-19

## (undated) RX ORDER — LIDOCAINE HYDROCHLORIDE 5 MG/ML
INJECTION, SOLUTION INFILTRATION; INTRAVENOUS
Status: DISPENSED
Start: 2024-03-18

## (undated) RX ORDER — FENTANYL CITRATE 50 UG/ML
INJECTION, SOLUTION INTRAMUSCULAR; INTRAVENOUS
Status: DISPENSED
Start: 2020-04-16

## (undated) RX ORDER — BUPIVACAINE HYDROCHLORIDE 2.5 MG/ML
INJECTION, SOLUTION EPIDURAL; INFILTRATION; INTRACAUDAL
Status: DISPENSED
Start: 2021-09-20

## (undated) RX ORDER — LIDOCAINE HYDROCHLORIDE 20 MG/ML
INJECTION, SOLUTION EPIDURAL; INFILTRATION; INTRACAUDAL; PERINEURAL
Status: DISPENSED
Start: 2020-04-16

## (undated) RX ORDER — LIDOCAINE HYDROCHLORIDE 10 MG/ML
INJECTION, SOLUTION EPIDURAL; INFILTRATION; INTRACAUDAL; PERINEURAL
Status: DISPENSED
Start: 2024-08-19

## (undated) RX ORDER — CEFAZOLIN SODIUM/WATER 2 G/20 ML
SYRINGE (ML) INTRAVENOUS
Status: DISPENSED
Start: 2024-08-19

## (undated) RX ORDER — BUPIVACAINE HYDROCHLORIDE AND EPINEPHRINE 2.5; 5 MG/ML; UG/ML
INJECTION, SOLUTION EPIDURAL; INFILTRATION; INTRACAUDAL; PERINEURAL
Status: DISPENSED
Start: 2024-08-19

## (undated) RX ORDER — PROPOFOL 10 MG/ML
INJECTION, EMULSION INTRAVENOUS
Status: DISPENSED
Start: 2020-04-16

## (undated) RX ORDER — KETOROLAC TROMETHAMINE 30 MG/ML
INJECTION, SOLUTION INTRAMUSCULAR; INTRAVENOUS
Status: DISPENSED
Start: 2021-09-20

## (undated) RX ORDER — HYDROMORPHONE HCL IN WATER/PF 6 MG/30 ML
PATIENT CONTROLLED ANALGESIA SYRINGE INTRAVENOUS
Status: DISPENSED
Start: 2024-08-19

## (undated) RX ORDER — IVABRADINE 5 MG/1
TABLET, FILM COATED ORAL
Status: DISPENSED
Start: 2024-07-19

## (undated) RX ORDER — DEXAMETHASONE SODIUM PHOSPHATE 10 MG/ML
INJECTION, SOLUTION INTRAMUSCULAR; INTRAVENOUS
Status: DISPENSED
Start: 2020-04-16

## (undated) RX ORDER — EPINEPHRINE 1 MG/ML
INJECTION, SOLUTION INTRAMUSCULAR; SUBCUTANEOUS
Status: DISPENSED
Start: 2021-09-20

## (undated) RX ORDER — ONDANSETRON 2 MG/ML
INJECTION INTRAMUSCULAR; INTRAVENOUS
Status: DISPENSED
Start: 2021-09-20

## (undated) RX ORDER — TRIAMCINOLONE ACETONIDE 40 MG/ML
INJECTION, SUSPENSION INTRA-ARTICULAR; INTRAMUSCULAR
Status: DISPENSED
Start: 2024-03-18

## (undated) RX ORDER — ONDANSETRON 2 MG/ML
INJECTION INTRAMUSCULAR; INTRAVENOUS
Status: DISPENSED
Start: 2020-04-16

## (undated) RX ORDER — SODIUM CHLORIDE, SODIUM LACTATE, POTASSIUM CHLORIDE, CALCIUM CHLORIDE 600; 310; 30; 20 MG/100ML; MG/100ML; MG/100ML; MG/100ML
INJECTION, SOLUTION INTRAVENOUS
Status: DISPENSED
Start: 2024-08-19

## (undated) RX ORDER — DOXYCYCLINE 100 MG/10ML
INJECTION, POWDER, LYOPHILIZED, FOR SOLUTION INTRAVENOUS
Status: DISPENSED
Start: 2021-09-20

## (undated) RX ORDER — KETOROLAC TROMETHAMINE 30 MG/ML
INJECTION, SOLUTION INTRAMUSCULAR; INTRAVENOUS
Status: DISPENSED
Start: 2024-08-19

## (undated) RX ORDER — ACETAMINOPHEN 325 MG/1
TABLET ORAL
Status: DISPENSED
Start: 2021-09-20

## (undated) RX ORDER — NITROGLYCERIN 0.4 MG/1
TABLET SUBLINGUAL
Status: DISPENSED
Start: 2024-07-19

## (undated) RX ORDER — ACETAMINOPHEN 325 MG/1
TABLET ORAL
Status: DISPENSED
Start: 2024-08-19

## (undated) RX ORDER — FENTANYL CITRATE 50 UG/ML
INJECTION, SOLUTION INTRAMUSCULAR; INTRAVENOUS
Status: DISPENSED
Start: 2021-09-20

## (undated) RX ORDER — DEXAMETHASONE SODIUM PHOSPHATE 4 MG/ML
INJECTION, SOLUTION INTRA-ARTICULAR; INTRALESIONAL; INTRAMUSCULAR; INTRAVENOUS; SOFT TISSUE
Status: DISPENSED
Start: 2021-09-20